# Patient Record
Sex: MALE | Race: WHITE | Employment: FULL TIME | URBAN - METROPOLITAN AREA
[De-identification: names, ages, dates, MRNs, and addresses within clinical notes are randomized per-mention and may not be internally consistent; named-entity substitution may affect disease eponyms.]

---

## 2017-01-04 ENCOUNTER — HOSPITAL ENCOUNTER (OUTPATIENT)
Dept: GENERAL RADIOLOGY | Age: 47
Discharge: HOME | End: 2017-01-04
Payer: COMMERCIAL

## 2017-01-04 DIAGNOSIS — R51.9 HEADACHE: ICD-10-CM

## 2017-01-04 DIAGNOSIS — M54.2 CERVICALGIA: ICD-10-CM

## 2017-01-04 PROCEDURE — 72050 X-RAY EXAM NECK SPINE 4/5VWS: CPT

## 2017-01-17 ENCOUNTER — OFFICE VISIT (OUTPATIENT)
Dept: NEUROLOGY | Age: 47
End: 2017-01-17

## 2017-01-17 VITALS
DIASTOLIC BLOOD PRESSURE: 80 MMHG | OXYGEN SATURATION: 98 % | HEIGHT: 72 IN | BODY MASS INDEX: 30.34 KG/M2 | WEIGHT: 224 LBS | RESPIRATION RATE: 18 BRPM | SYSTOLIC BLOOD PRESSURE: 118 MMHG | TEMPERATURE: 98.5 F | HEART RATE: 71 BPM

## 2017-01-17 DIAGNOSIS — G43.709 CHRONIC MIGRAINE W/O AURA W/O STATUS MIGRAINOSUS, NOT INTRACTABLE: ICD-10-CM

## 2017-01-17 DIAGNOSIS — M79.2 RADICULAR PAIN IN LEFT ARM: ICD-10-CM

## 2017-01-17 DIAGNOSIS — S06.0X0A CONCUSSION, WITHOUT LOSS OF CONSCIOUSNESS, INITIAL ENCOUNTER: ICD-10-CM

## 2017-01-17 DIAGNOSIS — Z87.898 HISTORY OF DIFFICULTY IN CONCENTRATING: ICD-10-CM

## 2017-01-17 DIAGNOSIS — R25.1 TREMOR OF LEFT HAND: ICD-10-CM

## 2017-01-17 DIAGNOSIS — Z87.820 HISTORY OF MULTIPLE CONCUSSIONS: ICD-10-CM

## 2017-01-17 DIAGNOSIS — R20.0 ARM NUMBNESS LEFT: ICD-10-CM

## 2017-01-17 DIAGNOSIS — R51.9 NEW ONSET HEADACHE: Primary | ICD-10-CM

## 2017-01-17 RX ORDER — LANOLIN ALCOHOL/MO/W.PET/CERES
400 CREAM (GRAM) TOPICAL
Qty: 90 TAB | Refills: 0 | Status: SHIPPED | OUTPATIENT
Start: 2017-01-17

## 2017-01-17 RX ORDER — VENLAFAXINE 75 MG/1
25 TABLET ORAL 3 TIMES DAILY
COMMUNITY
End: 2017-06-09 | Stop reason: DRUGHIGH

## 2017-01-17 NOTE — PROGRESS NOTES
Chief Complaint   Patient presents with    Concussion    Migraine       Referred by: Dr. Ritu Negron  and Dr. 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 He tells me that ever since he was a child he had multiple head injuries, 3 of which involving loss of consciousness. These were all sports related. His last concussion was June 2016 while he was working on a backyard deck. Concussion was severe enough that he dislocated his jaw. He has a history of chronic migraines usually left frontal/temporal periorbital throbbing pain associated with light and sound sensitivity. Since his concussion in June the headache has changed and he now has a right parietal headache. Headache is present daily fluctuating between dull and severe. Since his injury he is also had decreased focus and concentration. He has a hard time remembering little items. He was giving a presentation recently and lost his train of thought mid speech. He is also much more fatigued. He is more sensitive to alcohol than before. His sleep is chronically poor. He had a sleep study done many years ago which was borderline abnormal for sleep apnea. He saw Dr. Jacob Case in ENT who corrected his deviated septum which resulted in sleep improved slightly but still he has fatigue. There are plans to have a repeat sleep study done soon. He gets only about 3 or 4 hours a good quality sleep at night and the remainder is inconsistent. He wakes frequently during sleep. He has a history of depression. Most recently being treated with venlafaxine which is being titrated. He thinks that is helping in general overall. He has seen multiple neurologists for headache and neck pain management. He has chronic neck pain with left radicular symptoms. He has chronic left arm numbness in the ulnar distribution.     He has been on amitriptyline, gabapentin, and verapamil for headache prophylaxis in the past.    He is very concerned that his history of concussions combined with his most recent event will lead to cognitive decline. His headaches are daily as well. He had an MRI done in 2012 which had some nonspecific changes. I was able to see the report but not the actual imaging. Review of Systems   Constitutional: Positive for malaise/fatigue. Eyes: Positive for photophobia. Musculoskeletal: Positive for neck pain. Neurological: Positive for tremors, sensory change and headaches. Psychiatric/Behavioral: Positive for memory loss. The patient has insomnia. All other systems reviewed and are negative. Past Medical History   Diagnosis Date    Allergic rhinitis 4/8/2012    Asthma 4/8/2012    Asthma     Cancer ([facility] [address]      skin ca exision from scalp w/ subsequent  local numbness     HX OTHER MEDICAL      rt shoulder posterior instability     Migraine 4/8/2012    Sciatica     Sinus headache 4/8/2012    Tension headache 4/8/2012    Trauma      nasal fracture, 3 concussions, soft ball eye injury     Trauma 06/2016     concussion . dislocated jaw . eval'd by ENT . Dr. Andre Wilson and Dentist      History reviewed. No pertinent family history. Social History     Social History    Marital status:      Spouse name: N/A    Number of children: N/A    Years of education: N/A     Occupational History    Not on file. Social History Main Topics    Smoking status: Never Smoker    Smokeless tobacco: Not on file    Alcohol use Not on file    Drug use: Not on file    Sexual activity: Not on file     Other Topics Concern    Not on file     Social History Narrative     Current Outpatient Prescriptions   Medication Sig    IBUPROFEN (ADVIL PO) Take  by mouth.  aspirin-acetaminophen-caffeine (EXCEDRIN ES) 250-250-65 mg per tablet Take 1 Tab by mouth.  venlafaxine (EFFEXOR) 75 mg tablet Take 25 mg by mouth three (3) times daily.     DIPHENHYDRAMINE HCL (BENADRYL PO) Take  by mouth.    magnesium oxide (MAG-OX) 400 mg tablet Take 1 Tab by mouth nightly.  fluticasone (FLONASE) 50 mcg/actuation nasal spray 1 [place] by Both Nostrils route two (2) times a day.  amLODIPine-benazepril (LOTREL) 5-10 mg per capsule TAKE 1 CAPSULE DAILY    diclofenac EC (VOLTAREN) 50 mg EC tablet Take  by mouth two (2) times a day.  montelukast (SINGULAIR) 10 mg tablet Take 10 mg by mouth daily.  Cetirizine (ZYRTEC) 10 mg Cap Take  by mouth.  albuterol (PROVENTIL HFA, VENTOLIN HFA) 90 mcg/actuation inhaler Take 2 Puffs by inhalation every four (4) hours as needed.  CHLORZOXAZONE (LORZONE PO) Take  by mouth. 1/2 tab bid . No current facility-administered medications for this visit. Allergies   Allergen Reactions    Amitriptyline Other (comments)     Mental slowing     Gabapentin Other (comments)     Weight gain          Neurologic Exam     Mental Status   Oriented to person, place, and time. Attention: normal.   Speech: speech is normal   Level of consciousness: alert    Cranial Nerves   Cranial nerves II through XII intact. Motor Exam   Muscle bulk: normal  Overall muscle tone: normal    Strength   Strength 5/5 throughout. Left upper extremity slight cogwheel rigidity    Right lower extremity increased tone while supine     Sensory Exam   Light touch normal.   Sensory deficit distribution on left: C6    Gait, Coordination, and Reflexes     Gait  Gait: normal    Coordination   Finger to nose coordination: normal    Reflexes   Right biceps: 3+  Left biceps: 3+  Right triceps: 3+  Left triceps: 3+  Right patellar: 4+  Left patellar: 4+  Right plantar: normal  Left plantar: normal       Left hand kinetic tremor. No resting tremor. Minimal right hand kinetic tremor compared to left. Physical Exam   Constitutional: He is oriented to person, place, and time. Neurological: He is oriented to person, place, and time. He has normal strength.  He has a normal Finger-Nose-Finger Test. Gait normal.   Reflex Scores:       Tricep reflexes are 3+ on the right side and 3+ on the left side. Bicep reflexes are 3+ on the right side and 3+ on the left side. Patellar reflexes are 4+ on the right side and 4+ on the left side. Psychiatric: His speech is normal.     Visit Vitals    /80    Pulse 71    Temp 98.5 °F (36.9 °C)    Resp 18    Ht 5' 11.5\" (1.816 m)    Wt 101.6 kg (224 lb)    SpO2 98%    BMI 30.81 kg/m2       Lab Results  Component Value Date/Time   HGB (POC) 13.9 02/22/2016 10:45 AM   HCT (POC) 44.9 02/22/2016 10:45 AM       Lab Results  Component Value Date/Time   Glucose 94 02/22/2016 10:28 AM   Creatinine 0.83 02/22/2016 10:28 AM      No results found for: CHOL, CHOLX, CHLST, CHOLV, HDL, LDL, DLDL, LDLC, DLDLP, TGL, TGLX, TRIGL, [id number], TRIGP, CHHD, CHHDX    Lab Results  Component Value Date/Time   ALT 35 02/22/2016 10:28 AM   AST 32 02/22/2016 10:28 AM   Alk. phosphatase 61 02/22/2016 10:28 AM   Bilirubin, total 0.6 02/22/2016 10:28 AM          CT Results (maximum last 3): Results from [place] encounter on 02/05/15   CT SINUSES WO CONT W  [facility]   Narrative **Final Report**      ICD Codes / Adm. Diagnosis: 784.0  470 / Headache  Deviated nasal septum  Examination:  CT SINUS [facility] WO CON  - [id number] - Feb 5 2015  1:31PM  Accession No:  [id number]  Reason:  Headache; severe septal deviation      REPORT:  HISTORY: Chronic sinusitis. PROCEDURE:     Multiple contiguous helically acquired axial images were obtained through   the paranasal sinuses. Coronal and sagittal reconstructions were performed. FINDINGS:    The frontal sinuses are well pneumatized. The ethmoid air cells are well pneumatized. The osteomeatal complexes are generally narrowed but patent. Images through the maxillary sinuses reveal a small mucus retention cyst in   the left maxillary sinus. The sphenoid sinuses are well pneumatized.     The bony nasal septum is severely deviated towards right. No air-fluid level, bony expansile or emily destructive process are   identified. IMPRESSION:    Severe deviation of nasal septum toward the right. Generally narrowed but patent ostiomeatal complexes. Small mucus retention cyst in the left maxillary sinus. Signing/Reading Doctor: Arville Leventhal ([id number])    ApprovedArville Leventhal ([id number])  Feb 5 2015  2:55PM                                MRI Results (maximum last 3): Results from [facility] encounter on 10/10/14   MRI BRAIN W WO CONT    PET Results (maximum last 3): No results found for this or any previous visit. Assessment and Plan   Luba Riley was seen today for concussion and migraine. Diagnoses and all orders for this visit:    New onset headache    Concussion, without loss of consciousness, initial encounter    Chronic migraine w/o aura w/o status migrainosus, not intractable    Tremor of left hand  -     MRI BRAIN W WO CONT; Future  -     MRI CERV SPINE WO CONT; Future    History of multiple concussions    History of difficulty in concentrating    Radicular pain in left arm  -     MRI CERV SPINE WO CONT; Future    Arm numbness left  -     MRI BRAIN W WO CONT; Future  -     MRI CERV SPINE WO CONT; Future    Other orders  -     magnesium oxide (MAG-OX) 400 mg tablet; Take 1 Tab by mouth nightly. 48-year-old gentleman with multiple chronic conditions. 1.  He had a concussion in June 2016. He has had multiple concussions. He has an abnormal brain MRI. He still having postconcussive symptoms in the form of decreased focus and concentration and daily headache. Sleep is delaying recovery. Agree with plans for sleep study. Start magnesium nightly. He is hesitant to add any further medication while he is titrating venlafaxine which is reasonable. Also obtain MRI brain. He has a history of an abnormal study in 2012.   He also has this change in headache from his baseline. Headaches are usually left-sided but now they are right midline parietal.    2.  He meets criteria for chronic migraine. He has more than 15 days of headache pain per month lasting more than 4 hours each. He is already been on amitriptyline, verapamil, and gabapentin without benefit. We will refer for Botox injections. 3.  He has left arm numbness and pain in a radicular distribution. He is already receiving physical therapy for this. Exam is abnormal with notable hyperreflexia and increased tone. Recommend an MRI C-spine be done to further evaluate this. 4. the left hand tremor is noticeable. There is some slight increase rigidity. Follow this for now. Family history for Parkinson's. No other typical signs or symptoms for Parkinson's at this time. Seems essential in origin but could have pathology. I discussed with him that he needs to work on optimizing his sleep as much as possible. Poor sleep will make his above conditions refractory to treatment. I would like to see him in 3 months. If any of the above evaluation is acutely abnormal we will see him sooner. Continue physical therapy for now. A notice of this visit/encounter being completed has been sent electronically to the patient's PCP and/or referring provider.      2 [facility], [facility] [place] [facility] [place] [address]  Diplomate [employer]

## 2017-01-17 NOTE — COMMUNICATION BODY
Chief Complaint   Patient presents with    Concussion    Migraine       Referred by: Dr. Sharda Nguyen  and Dr. 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 He tells me that ever since he was a child he had multiple head injuries, 3 of which involving loss of consciousness. These were all sports related. His last concussion was June 2016 while he was working on a backyard deck. Concussion was severe enough that he dislocated his jaw. He has a history of chronic migraines usually left frontal/temporal periorbital throbbing pain associated with light and sound sensitivity. Since his concussion in June the headache has changed and he now has a right parietal headache. Headache is present daily fluctuating between dull and severe. Since his injury he is also had decreased focus and concentration. He has a hard time remembering little items. He was giving a presentation recently and lost his train of thought mid speech. He is also much more fatigued. He is more sensitive to alcohol than before. His sleep is chronically poor. He had a sleep study done many years ago which was borderline abnormal for sleep apnea. He saw Dr. Renata Lee in ENT who corrected his deviated septum which resulted in sleep improved slightly but still he has fatigue. There are plans to have a repeat sleep study done soon. He gets only about 3 or 4 hours a good quality sleep at night and the remainder is inconsistent. He wakes frequently during sleep. He has a history of depression. Most recently being treated with venlafaxine which is being titrated. He thinks that is helping in general overall. He has seen multiple neurologists for headache and neck pain management. He has chronic neck pain with left radicular symptoms. He has chronic left arm numbness in the ulnar distribution.     He has been on amitriptyline, gabapentin, and verapamil for headache prophylaxis in the past.    He is very concerned that his history of concussions combined with his most recent event will lead to cognitive decline. His headaches are daily as well. He had an MRI done in 2012 which had some nonspecific changes. I was able to see the report but not the actual imaging. Review of Systems   Constitutional: Positive for malaise/fatigue. Eyes: Positive for photophobia. Musculoskeletal: Positive for neck pain. Neurological: Positive for tremors, sensory change and headaches. Psychiatric/Behavioral: Positive for memory loss. The patient has insomnia. All other systems reviewed and are negative. Past Medical History   Diagnosis Date    Allergic rhinitis 4/8/2012    Asthma 4/8/2012    Asthma     Cancer ([facility] [address]      skin ca exision from scalp w/ subsequent  local numbness     HX OTHER MEDICAL      rt shoulder posterior instability     Migraine 4/8/2012    Sciatica     Sinus headache 4/8/2012    Tension headache 4/8/2012    Trauma      nasal fracture, 3 concussions, soft ball eye injury     Trauma 06/2016     concussion . dislocated jaw . eval'd by ENT . Dr. Ely Rico and Dentist      History reviewed. No pertinent family history. Social History     Social History    Marital status:      Spouse name: N/A    Number of children: N/A    Years of education: N/A     Occupational History    Not on file. Social History Main Topics    Smoking status: Never Smoker    Smokeless tobacco: Not on file    Alcohol use Not on file    Drug use: Not on file    Sexual activity: Not on file     Other Topics Concern    Not on file     Social History Narrative     Current Outpatient Prescriptions   Medication Sig    IBUPROFEN (ADVIL PO) Take  by mouth.  aspirin-acetaminophen-caffeine (EXCEDRIN ES) 250-250-65 mg per tablet Take 1 Tab by mouth.  venlafaxine (EFFEXOR) 75 mg tablet Take 25 mg by mouth three (3) times daily.     DIPHENHYDRAMINE HCL (BENADRYL PO) Take  by mouth.    magnesium oxide (MAG-OX) 400 mg tablet Take 1 Tab by mouth nightly.  fluticasone (FLONASE) 50 mcg/actuation nasal spray 1 [place] by Both Nostrils route two (2) times a day.  amLODIPine-benazepril (LOTREL) 5-10 mg per capsule TAKE 1 CAPSULE DAILY    diclofenac EC (VOLTAREN) 50 mg EC tablet Take  by mouth two (2) times a day.  montelukast (SINGULAIR) 10 mg tablet Take 10 mg by mouth daily.  Cetirizine (ZYRTEC) 10 mg Cap Take  by mouth.  albuterol (PROVENTIL HFA, VENTOLIN HFA) 90 mcg/actuation inhaler Take 2 Puffs by inhalation every four (4) hours as needed.  CHLORZOXAZONE (LORZONE PO) Take  by mouth. 1/2 tab bid . No current facility-administered medications for this visit. Allergies   Allergen Reactions    Amitriptyline Other (comments)     Mental slowing     Gabapentin Other (comments)     Weight gain          Neurologic Exam     Mental Status   Oriented to person, place, and time. Attention: normal.   Speech: speech is normal   Level of consciousness: alert    Cranial Nerves   Cranial nerves II through XII intact. Motor Exam   Muscle bulk: normal  Overall muscle tone: normal    Strength   Strength 5/5 throughout. Left upper extremity slight cogwheel rigidity    Right lower extremity increased tone while supine     Sensory Exam   Light touch normal.   Sensory deficit distribution on left: C6    Gait, Coordination, and Reflexes     Gait  Gait: normal    Coordination   Finger to nose coordination: normal    Reflexes   Right biceps: 3+  Left biceps: 3+  Right triceps: 3+  Left triceps: 3+  Right patellar: 4+  Left patellar: 4+  Right plantar: normal  Left plantar: normal       Left hand kinetic tremor. No resting tremor. Minimal right hand kinetic tremor compared to left. Physical Exam   Constitutional: He is oriented to person, place, and time. Neurological: He is oriented to person, place, and time. He has normal strength.  He has a normal Finger-Nose-Finger Test. Gait normal.   Reflex Scores:       Tricep reflexes are 3+ on the right side and 3+ on the left side. Bicep reflexes are 3+ on the right side and 3+ on the left side. Patellar reflexes are 4+ on the right side and 4+ on the left side. Psychiatric: His speech is normal.     Visit Vitals    /80    Pulse 71    Temp 98.5 °F (36.9 °C)    Resp 18    Ht 5' 11.5\" (1.816 m)    Wt 101.6 kg (224 lb)    SpO2 98%    BMI 30.81 kg/m2       Lab Results  Component Value Date/Time   HGB (POC) 13.9 02/22/2016 10:45 AM   HCT (POC) 44.9 02/22/2016 10:45 AM       Lab Results  Component Value Date/Time   Glucose 94 02/22/2016 10:28 AM   Creatinine 0.83 02/22/2016 10:28 AM      No results found for: CHOL, CHOLX, CHLST, CHOLV, HDL, LDL, DLDL, LDLC, DLDLP, TGL, TGLX, TRIGL, [id number], TRIGP, CHHD, CHHDX    Lab Results  Component Value Date/Time   ALT 35 02/22/2016 10:28 AM   AST 32 02/22/2016 10:28 AM   Alk. phosphatase 61 02/22/2016 10:28 AM   Bilirubin, total 0.6 02/22/2016 10:28 AM          CT Results (maximum last 3): Results from [place] encounter on 02/05/15   CT SINUSES WO CONT W  [facility]   Narrative **Final Report**      ICD Codes / Adm. Diagnosis: 784.0  470 / Headache  Deviated nasal septum  Examination:  CT SINUS [facility] WO CON  - [id number] - Feb 5 2015  1:31PM  Accession No:  [id number]  Reason:  Headache; severe septal deviation      REPORT:  HISTORY: Chronic sinusitis. PROCEDURE:     Multiple contiguous helically acquired axial images were obtained through   the paranasal sinuses. Coronal and sagittal reconstructions were performed. FINDINGS:    The frontal sinuses are well pneumatized. The ethmoid air cells are well pneumatized. The osteomeatal complexes are generally narrowed but patent. Images through the maxillary sinuses reveal a small mucus retention cyst in   the left maxillary sinus. The sphenoid sinuses are well pneumatized.     The bony nasal septum is severely deviated towards right. No air-fluid level, bony expansile or emily destructive process are   identified. IMPRESSION:    Severe deviation of nasal septum toward the right. Generally narrowed but patent ostiomeatal complexes. Small mucus retention cyst in the left maxillary sinus. Signing/Reading Doctor: Gabrielle Thomas ([id number])    Karen Thomas ([id number])  Feb 5 2015  2:55PM                                MRI Results (maximum last 3): Results from Abstract encounter on 10/10/14   MRI BRAIN W WO CONT    PET Results (maximum last 3): No results found for this or any previous visit. Assessment and Plan   Raffy Barreto was seen today for concussion and migraine. Diagnoses and all orders for this visit:    New onset headache    Concussion, without loss of consciousness, initial encounter    Chronic migraine w/o aura w/o status migrainosus, not intractable    Tremor of left hand  -     MRI BRAIN W WO CONT; Future  -     MRI CERV SPINE WO CONT; Future    History of multiple concussions    History of difficulty in concentrating    Radicular pain in left arm  -     MRI CERV SPINE WO CONT; Future    Arm numbness left  -     MRI BRAIN W WO CONT; Future  -     MRI CERV SPINE WO CONT; Future    Other orders  -     magnesium oxide (MAG-OX) 400 mg tablet; Take 1 Tab by mouth nightly. 50-year-old gentleman with multiple chronic conditions. 1.  He had a concussion in June 2016. He has had multiple concussions. He has an abnormal brain MRI. He still having postconcussive symptoms in the form of decreased focus and concentration and daily headache. Sleep is delaying recovery. Agree with plans for sleep study. Start magnesium nightly. He is hesitant to add any further medication while he is titrating venlafaxine which is reasonable. Also obtain MRI brain. He has a history of an abnormal study in 2012.   He also has this change in headache from his baseline. Headaches are usually left-sided but now they are right midline parietal.    2.  He meets criteria for chronic migraine. He has more than 15 days of headache pain per month lasting more than 4 hours each. He is already been on amitriptyline, verapamil, and gabapentin without benefit. We will refer for Botox injections. 3.  He has left arm numbness and pain in a radicular distribution. He is already receiving physical therapy for this. Exam is abnormal with notable hyperreflexia and increased tone. Recommend an MRI C-spine be done to further evaluate this. 4. the left hand tremor is noticeable. There is some slight increase rigidity. Follow this for now. Family history for Parkinson's. No other typical signs or symptoms for Parkinson's at this time. Seems essential in origin but could have pathology. I discussed with him that he needs to work on optimizing his sleep as much as possible. Poor sleep will make his above conditions refractory to treatment. I would like to see him in 3 months. If any of the above evaluation is acutely abnormal we will see him sooner. Continue physical therapy for now. A notice of this visit/encounter being completed has been sent electronically to the patient's PCP and/or referring provider.      2 [facility], [facility] [address]  Diplomate [employer]

## 2017-01-17 NOTE — MR AVS SNAPSHOT
Visit Information Date & Time Provider Department Dept. Phone Encounter #  
 1/17/2017  9:00  [facility] DO [facility] at [address] [id number] Follow-up Instructions Return in about 3 months (around 4/17/2017). Routing History Upcoming Health Maintenance Date Due Pneumococcal 19-64 Medium Risk (1 of 1 - PPSV23) 11/19/1988 DTaP/Tdap/Td series (1 - Tdap) 5/21/2005 INFLUENZA AGE 9 TO ADULT 8/1/2016 Allergies as of 1/17/2017  Review Complete On: 1/17/2017 By: Kunal Noel LPN Severity Noted Reaction Type Reactions Amitriptyline  12/09/2016    Other (comments) Mental slowing Gabapentin  12/09/2016    Other (comments) Weight gain Current Immunizations  Reviewed on 11/4/2013 Name Date Td 5/20/2005 Not reviewed this visit You Were Diagnosed With   
  
 Codes Comments New onset headache    -  Primary ICD-10-CM: R46 ICD-9-CM: 784.0 Concussion, without loss of consciousness, initial encounter     ICD-10-CM: S06.0X0A 
ICD-9-CM: 850.0 Chronic migraine w/o aura w/o status migrainosus, not intractable     ICD-10-CM: B18.014 ICD-9-CM: 346.70 Tremor of left hand     ICD-10-CM: R25.1 ICD-9-CM: 781.0 History of multiple concussions     ICD-10-CM: Z87.820 ICD-9-CM: V15.52 History of difficulty in concentrating     ICD-10-CM: Z86.59 
ICD-9-CM: V11.9 Radicular pain in left arm     ICD-10-CM: M79.2 ICD-9-CM: 729.2 Arm numbness left     ICD-10-CM: R20.0 ICD-9-CM: 876. 0 Vitals BP Pulse Temp Resp Height(growth percentile) Weight(growth percentile) 118/80 71 98.5 °F (36.9 °C) 18 5' 11.5\" (1.816 m) 224 lb (101.6 kg) SpO2 BMI Smoking Status 98% 30.81 kg/m2 Never Smoker Vitals History BMI and BSA Data Body Mass Index Body Surface Area  
 30.81 kg/m 2 2.26 m 2 [phone] Your Updated Medication List  
  
   
This list is accurate as of: 1/17/17  9:46 AM.  Always use your most recent med list. ADVIL PO Take  by mouth. albuterol 90 mcg/actuation inhaler Commonly known as:  PROVENTIL HFA, VENTOLIN HFA, PROAIR HFA Take 2 Puffs by inhalation every four (4) hours as needed. amLODIPine-benazepril 5-10 mg per capsule Commonly known as:  LOTREL  
TAKE 1 CAPSULE DAILY  
  
 aspirin-acetaminophen-caffeine 250-250-65 mg per tablet Commonly known as:  EXCEDRIN ES Take 1 Tab by mouth. BENADRYL PO Take  by mouth. diclofenac EC 50 mg EC tablet Commonly known as:  VOLTAREN Take  by mouth two (2) times a day. fluticasone 50 mcg/actuation nasal spray Commonly known as:  FLONASE  
1 [place] by Both Nostrils route two (2) times a day. LORZONE PO Take  by mouth. 1/2 tab bid .  
  
 magnesium oxide 400 mg tablet Commonly known as:  MAG-OX Take 1 Tab by mouth nightly. SINGULAIR 10 mg tablet Generic drug:  montelukast  
Take 10 mg by mouth daily. venlafaxine 75 mg tablet Commonly known as:  [facility] Take 25 mg by mouth three (3) times daily. ZyrTEC 10 mg Cap Generic drug:  Cetirizine Take  by mouth. Prescriptions Sent to Pharmacy Refills  
 magnesium oxide (MAG-OX) 400 mg tablet 0 Sig: Take 1 Tab by mouth nightly. Class: Normal  
 Pharmacy: [address], [address] [place] [address] #: [phone] Route: Oral  
  
Follow-up Instructions Return in about 3 months (around 4/17/2017). To-Do List   
 01/17/2017 Imaging:  MRI BRAIN W WO CONT   
  
 01/17/2017 Imaging:  MRI CERV SPINE WO CONT Patient Instructions PRESCRIPTION REFILL POLICY [facility] Statement to Patients April 1, 2014 In an effort to ensure the large volume of patient prescription refills is processed in the most efficient and expeditious manner, we are asking our patients to assist us by calling your Pharmacy for all prescription refills, this will include also your  Mail Order Pharmacy. The pharmacy will contact our office electronically to continue the refill process. Please do not wait until the last minute to call your pharmacy. We need at least 48 hours (2days) to fill prescriptions. We also encourage you to call your pharmacy before going to  your prescription to make sure it is ready. With regard to controlled substance prescription refill requests (narcotic refills) that need to be picked up at our office, we ask your cooperation by providing us with at least 72 hours (3days) notice that you will need a refill. We will not refill narcotic prescription refill requests after 4:00pm on any weekday, Monday through Thursday, or after 2:00pm on Fridays, or on the weekends. We encourage everyone to explore another way of getting your prescription refill request processed using [employer], our patient web portal through our electronic medical record system. [employer] is an efficient and effective way to communicate your medication request directly to the office and  downloadable as an mily on your smart phone . [employer] also features a review functionality that allows you to view your medication list as well as leave messages for your physician. Are you ready to get connected? If so please review the attatched instructions or speak to any of our staff to get you set up right away! Thank you so much for your cooperation. Should you have any questions please contact our Practice Administrator. The Physicians and Staff,  [facility] Thank you for choosing [employer] and [facility] for your  
 
care. You may receive a survey about your visit.   We appreciate you taking time  
 
to complete this survey as we use your feedback to improve our services. We  
 
realize we are not perfect, but we strive to provide excellent care. Recurring Migraine Headache: Care Instructions Your Care Instructions Migraines are painful, throbbing headaches. They often start on one side of the head. They may cause nausea and vomiting and make you sensitive to light, sound, or smell. Some people may have only a few migraines throughout life. Others have them as often as several times a month. The goal of treatment is to reduce the number of migraines you have and relieve your symptoms. Even with treatment, you may continue to have migraines. You play an important role in dealing with your headaches. Work on avoiding things that seem to trigger your migraines. When you feel a headache coming on, act quickly to stop it before it gets worse. Follow-up care is a key part of your treatment and safety. Be sure to make and go to all appointments, and call your doctor if you are having problems. It's also a good idea to know your test results and keep a list of the medicines you take. How can you care for yourself at home? · Do not drive if you have taken a prescription pain medicine. · Rest in a quiet, dark room until your headache is gone. Close your eyes and try to relax or go to sleep. Do not watch TV or read. · Put a cold, moist cloth or cold pack on the painful area for 10 to 20 minutes at a time. Put a thin cloth between the cold pack and your skin. · Have someone gently massage your neck and shoulders. · Take your medicines exactly as prescribed. Call your doctor if you think you are having a problem with your medicine. You will get more details on the specific medicines your doctor prescribes. To prevent migraines · Keep a headache diary so you can figure out what triggers your headaches. Avoiding triggers may help you prevent headaches.  Record when each headache began, how long it lasted, and what the pain was like. Use words like throbbing, aching, stabbing, or dull. Write down any other symptoms you had with the headache. These may include nausea, flashing lights or dark spots, or sensitivity to bright light or loud noise. Note if the headache occurred near your period. List anything that might have triggered the headache. Triggers may include certain foods (chocolate, cheese, wine) or odors, smoke, bright light, stress, or lack of sleep. · If your doctor has prescribed medicine for your migraines, take it as directed. You may have medicine that you take only when you get a migraine and medicine that you take all the time to help prevent migraines. ¨ If your doctor has prescribed medicine for when you get a headache, take it at the first sign of a migraine, unless your doctor has given you other instructions. ¨ If your doctor has prescribed medicine to prevent migraines, take it exactly as prescribed. Call your doctor if you think you are having a problem with your medicine. · Find healthy ways to deal with stress. Migraines are most common during or right after stressful times. Take time to relax before and after you do something that has caused a migraine in the past. 
· Try to keep your muscles relaxed by keeping good posture. Check your jaw, face, neck, and shoulder muscles for tension. Try to relax them. When sitting at a desk, change positions often. Stretch for 30 seconds each hour. · Get regular sleep and exercise. · Eat regular meals, and avoid foods and drinks that often trigger migraines. These include chocolate and alcohol, especially red wine and port. Chemicals used in food, such as aspartame and monosodium glutamate (MSG), also can trigger migraines. So can some food additives, such as those found in hot dogs, mohamud, cold cuts, aged cheeses, and pickled foods. · Limit caffeine by not drinking too much coffee, tea, or soda.  Do not quit caffeine suddenly, because that can also give you migraines. · Do not smoke or allow others to smoke around you. If you need help quitting, talk to your doctor about stop-smoking programs and medicines. These can increase your chances of quitting for good. · If you are taking birth control pills or hormone therapy, talk to your doctor about whether they are triggering your migraines. When should you call for help? Call 911 anytime you think you may need emergency care. For example, call if: 
· You have symptoms of a stroke. These may include: 
¨ Sudden numbness, tingling, weakness, or loss of movement in your face, arm, or leg, especially on only one side of your body. ¨ Sudden vision changes. ¨ Sudden trouble speaking. ¨ Sudden confusion or trouble understanding simple statements. ¨ Sudden problems with walking or balance. ¨ A sudden, severe headache that is different from past headaches. Call your doctor now or seek immediate medical care if: 
· You develop a fever and a stiff neck. · You have new nausea and vomiting, or you cannot keep down food or liquids. Watch closely for changes in your health, and be sure to contact your doctor if: 
· You have a headache that does not get better within 1 or 2 days. · Your headaches get worse or happen more often. Where can you learn more? Go to http://casey-zachariah.info/. Enter V975 in the search box to learn more about \"Recurring Migraine Headache: Care Instructions. \" Current as of: February 19, 2016 Content Version: 11.1 © 9729-5914 [employer]. Care instructions adapted under license by [employer] (which disclaims liability or warranty for this information). If you have questions about a medical condition or this instruction, always ask your healthcare professional. [place] [address] any warranty or liability for your use of this information. Learning About a Closed Head Injury What is a closed head injury? A closed head injury happens when your head gets hit hard. The strong force of the blow causes your brain to shake in your skull. This movement can cause the brain to bruise, swell, or tear. Sometimes nerves or blood vessels also get damaged. This can cause bleeding in or around the brain. A concussion is a type of closed head injury. What are the symptoms? If you have a mild concussion, you may have a mild headache or feel \"not quite right. \" These symptoms are common. They usually go away over a few days to 4 weeks. But sometimes after a concussion, you feel like you can't function as well as before the injury. And you have new symptoms. This is called postconcussive syndrome. You may: · Find it harder to solve problems, think, concentrate, or remember. · Have headaches. · Have changes in your sleep patterns, such as not being able to sleep or sleeping all the time. · Have changes in your personality. · Not be interested in your usual activities. · Feel angry or anxious without a clear reason. · Lose your sense of taste or smell. · Be dizzy, lightheaded, or unsteady. It may be hard to stand or walk. How is a closed head injury treated? Any person who may have a concussion needs to see a doctor. Some people have to stay in the hospital to be watched. Others can go home safely. If you go home, follow your doctor's instructions. He or she will tell you if you need someone to watch you closely for the next 24 hours or longer. Rest is the best treatment. Get plenty of sleep at night. And try to rest during the day. · Avoid activities that are physically or mentally demanding. These include housework, exercise, and schoolwork. And don't play video games, send text messages, or use the computer. You may need to change your school or work schedule to be able to avoid these activities. · Ask your doctor when it's okay to drive, ride a bike, or operate machinery. · Take an over-the-counter pain medicine, such as acetaminophen (Tylenol), ibuprofen (Advil, Motrin), or naproxen (Aleve). Be safe with medicines. Read and follow all instructions on the label. · Check with your doctor before you use any other medicines for pain. · Do not drink alcohol or use illegal drugs. They can slow recovery. They can also increase your risk of getting a second head injury. Follow-up care is a key part of your treatment and safety. Be sure to make and go to all appointments, and call your doctor if you are having problems. It's also a good idea to know your test results and keep a list of the medicines you take. Where can you learn more? Go to http://casey-zachariah.info/. Enter E235 in the search box to learn more about \"Learning About a Closed Head Injury. \" Current as of: April 22, 2016 Content Version: 11.1 © 5894-3630 [employer]. Care instructions adapted under license by [employer] (which disclaims liability or warranty for this information). If you have questions about a medical condition or this instruction, always ask your healthcare professional. [place] [address] any warranty or liability for your use of this information. Introducing [facility]! Johana Kent introduces [employer] patient portal. Now you can access parts of your medical record, email your doctor's office, and request medication refills online. 1. In your internet browser, go to [email or web address] 2. Click on the First Time User? Click Here link in the Sign In box. You will see the New Member Sign Up page. 3. Enter your [employer] Access Code exactly as it appears below. You will not need to use this code after youve completed the sign-up process. If you do not sign up before the expiration date, you must request a new code. · [employer] Access Code: [email or web address] Expires: 4/4/2017  1:51 PM 
 
 4. Enter the last four digits of your Social Security Number (xxxx) and Date of Birth (mm/dd/yyyy) as indicated and click Submit. You will be taken to the next sign-up page. 5. Create a [employer] ID. This will be your [employer] login ID and cannot be changed, so think of one that is secure and easy to remember. 6. Create a [employer] password. You can change your password at any time. 7. Enter your Password Reset Question and Answer. This can be used at a later time if you forget your password. 8. Enter your e-mail address. You will receive e-mail notification when new information is available in [address] 9. Click Sign Up. You can now view and download portions of your medical record. 10. Click the Download Summary menu link to download a portable copy of your medical information. If you have questions, please visit the Frequently Asked Questions section of the [employer] website. Remember, [employer] is NOT to be used for urgent needs. For medical emergencies, dial 911. Now available from your iPhone and Android! Please provide this summary of care documentation to your next provider. Your primary care clinician is listed as [id number] [address]omes IV. If you have any questions after today's visit, please call [phone].

## 2017-01-17 NOTE — PATIENT INSTRUCTIONS
[facility]   Statement to Patients  April 1, 2014      In an effort to ensure the large volume of patient prescription refills is processed in the most efficient and expeditious manner, we are asking our patients to assist us by calling your Pharmacy for all prescription refills, this will include also your  Mail Order Pharmacy. The pharmacy will contact our office electronically to continue the refill process. Please do not wait until the last minute to call your pharmacy. We need at least 48 hours (2days) to fill prescriptions. We also encourage you to call your pharmacy before going to  your prescription to make sure it is ready. With regard to controlled substance prescription refill requests (narcotic refills) that need to be picked up at our office, we ask your cooperation by providing us with at least 72 hours (3days) notice that you will need a refill. We will not refill narcotic prescription refill requests after 4:00pm on any weekday, Monday through Thursday, or after 2:00pm on Fridays, or on the weekends. We encourage everyone to explore another way of getting your prescription refill request processed using [employer], our patient web portal through our electronic medical record system. [employer] is an efficient and effective way to communicate your medication request directly to the office and  downloadable as an mily on your smart phone . [employer] also features a review functionality that allows you to view your medication list as well as leave messages for your physician. Are you ready to get connected? If so please review the attatched instructions or speak to any of our staff to get you set up right away! Thank you so much for your cooperation. Should you have any questions please contact our Practice Administrator.     The Physicians and Staff,  [facility]     Thank you for choosing [facility] for your     care. You may receive a survey about your visit. We appreciate you taking time     to complete this survey as we use your feedback to improve our services. We     realize we are not perfect, but we strive to provide excellent care. Recurring Migraine Headache: Care Instructions  Your Care Instructions  Migraines are painful, throbbing headaches. They often start on one side of the head. They may cause nausea and vomiting and make you sensitive to light, sound, or smell. Some people may have only a few migraines throughout life. Others have them as often as several times a month. The goal of treatment is to reduce the number of migraines you have and relieve your symptoms. Even with treatment, you may continue to have migraines. You play an important role in dealing with your headaches. Work on avoiding things that seem to trigger your migraines. When you feel a headache coming on, act quickly to stop it before it gets worse. Follow-up care is a key part of your treatment and safety. Be sure to make and go to all appointments, and call your doctor if you are having problems. It's also a good idea to know your test results and keep a list of the medicines you take. How can you care for yourself at home? · Do not drive if you have taken a prescription pain medicine. · Rest in a quiet, dark room until your headache is gone. Close your eyes and try to relax or go to sleep. Do not watch TV or read. · Put a cold, moist cloth or cold pack on the painful area for 10 to 20 minutes at a time. Put a thin cloth between the cold pack and your skin. · Have someone gently massage your neck and shoulders. · Take your medicines exactly as prescribed. Call your doctor if you think you are having a problem with your medicine. You will get more details on the specific medicines your doctor prescribes.   To prevent migraines  · Keep a headache diary so you can figure out what triggers your headaches. Avoiding triggers may help you prevent headaches. Record when each headache began, how long it lasted, and what the pain was like. Use words like throbbing, aching, stabbing, or dull. Write down any other symptoms you had with the headache. These may include nausea, flashing lights or dark spots, or sensitivity to bright light or loud noise. Note if the headache occurred near your period. List anything that might have triggered the headache. Triggers may include certain foods (chocolate, cheese, wine) or odors, smoke, bright light, stress, or lack of sleep. · If your doctor has prescribed medicine for your migraines, take it as directed. You may have medicine that you take only when you get a migraine and medicine that you take all the time to help prevent migraines. ¨ If your doctor has prescribed medicine for when you get a headache, take it at the first sign of a migraine, unless your doctor has given you other instructions. ¨ If your doctor has prescribed medicine to prevent migraines, take it exactly as prescribed. Call your doctor if you think you are having a problem with your medicine. · Find healthy ways to deal with stress. Migraines are most common during or right after stressful times. Take time to relax before and after you do something that has caused a migraine in the past.  · Try to keep your muscles relaxed by keeping good posture. Check your jaw, face, neck, and shoulder muscles for tension. Try to relax them. When sitting at a desk, change positions often. Stretch for 30 seconds each hour. · Get regular sleep and exercise. · Eat regular meals, and avoid foods and drinks that often trigger migraines. These include chocolate and alcohol, especially red wine and port. Chemicals used in food, such as aspartame and monosodium glutamate (MSG), also can trigger migraines.  So can some food additives, such as those found in hot dogs, mohamud, cold cuts, aged cheeses, and pickled foods.  · Limit caffeine by not drinking too much coffee, tea, or soda. Do not quit caffeine suddenly, because that can also give you migraines. · Do not smoke or allow others to smoke around you. If you need help quitting, talk to your doctor about stop-smoking programs and medicines. These can increase your chances of quitting for good. · If you are taking birth control pills or hormone therapy, talk to your doctor about whether they are triggering your migraines. When should you call for help? Call 911 anytime you think you may need emergency care. For example, call if:  · You have symptoms of a stroke. These may include:  ¨ Sudden numbness, tingling, weakness, or loss of movement in your face, arm, or leg, especially on only one side of your body. ¨ Sudden vision changes. ¨ Sudden trouble speaking. ¨ Sudden confusion or trouble understanding simple statements. ¨ Sudden problems with walking or balance. ¨ A sudden, severe headache that is different from past headaches. Call your doctor now or seek immediate medical care if:  · You develop a fever and a stiff neck. · You have new nausea and vomiting, or you cannot keep down food or liquids. Watch closely for changes in your health, and be sure to contact your doctor if:  · You have a headache that does not get better within 1 or 2 days. · Your headaches get worse or happen more often. Where can you learn more? Go to http://casey-zachariah.info/. Enter V975 in the search box to learn more about \"Recurring Migraine Headache: Care Instructions. \"  Current as of: February 19, 2016  Content Version: 11.1  © 7242-9215 [employer]. Care instructions adapted under license by [employer] (which disclaims liability or warranty for this information).  If you have questions about a medical condition or this instruction, always ask your healthcare professional. Lenka Gallego disclaims any warranty or liability for your use of this information. Learning About a Closed Head Injury  What is a closed head injury? A closed head injury happens when your head gets hit hard. The strong force of the blow causes your brain to shake in your skull. This movement can cause the brain to bruise, swell, or tear. Sometimes nerves or blood vessels also get damaged. This can cause bleeding in or around the brain. A concussion is a type of closed head injury. What are the symptoms? If you have a mild concussion, you may have a mild headache or feel \"not quite right. \" These symptoms are common. They usually go away over a few days to 4 weeks. But sometimes after a concussion, you feel like you can't function as well as before the injury. And you have new symptoms. This is called postconcussive syndrome. You may:  · Find it harder to solve problems, think, concentrate, or remember. · Have headaches. · Have changes in your sleep patterns, such as not being able to sleep or sleeping all the time. · Have changes in your personality. · Not be interested in your usual activities. · Feel angry or anxious without a clear reason. · Lose your sense of taste or smell. · Be dizzy, lightheaded, or unsteady. It may be hard to stand or walk. How is a closed head injury treated? Any person who may have a concussion needs to see a doctor. Some people have to stay in the hospital to be watched. Others can go home safely. If you go home, follow your doctor's instructions. He or she will tell you if you need someone to watch you closely for the next 24 hours or longer. Rest is the best treatment. Get plenty of sleep at night. And try to rest during the day. · Avoid activities that are physically or mentally demanding. These include housework, exercise, and schoolwork. And don't play video games, send text messages, or use the computer. You may need to change your school or work schedule to be able to avoid these activities.   · Ask your doctor when it's okay to drive, ride a bike, or operate machinery. · Take an over-the-counter pain medicine, such as acetaminophen (Tylenol), ibuprofen (Advil, Motrin), or naproxen (Aleve). Be safe with medicines. Read and follow all instructions on the label. · Check with your doctor before you use any other medicines for pain. · Do not drink alcohol or use illegal drugs. They can slow recovery. They can also increase your risk of getting a second head injury. Follow-up care is a key part of your treatment and safety. Be sure to make and go to all appointments, and call your doctor if you are having problems. It's also a good idea to know your test results and keep a list of the medicines you take. Where can you learn more? Go to http://casey-zachariah.info/. Enter E235 in the search box to learn more about \"Learning About a Closed Head Injury. \"  Current as of: April 22, 2016  Content Version: 11.1  © 1126-2134 [employer]. Care instructions adapted under license by [employer] (which disclaims liability or warranty for this information). If you have questions about a medical condition or this instruction, always ask your healthcare professional. [address] any warranty or liability for your use of this information.

## 2017-01-17 NOTE — LETTER
1/17/2017 Patient:  Drea Garibay YOB: 1970 Date of Visit: 1/17/2017 Dear Amado Hall MD 
[address] [id number] VIA In Basket Mary Atkins MD 
[address] [facility] Cecy Knowles [phone] [id number] VIA Facsimile: [phone] 
 : I was requested by Amado Hall MD to evaluate Mr. Drea Garibay  for Chief Complaint Patient presents with  Concussion  Migraine Stefan Stinson I am recommending the following:  
 
[facility] was seen today for concussion and migraine. Diagnoses and all orders for this visit: 
 
New onset headache Concussion, without loss of consciousness, initial encounter Chronic migraine w/o aura w/o status migrainosus, not intractable 
-     REFERRAL TO NEUROLOGY Tremor of left hand -     MRI BRAIN W WO CONT; Future -     MRI CERV SPINE WO CONT; Future History of multiple concussions History of difficulty in concentrating Radicular pain in left arm -     MRI CERV SPINE WO CONT; Future Arm numbness left -     MRI BRAIN W WO CONT; Future -     MRI CERV SPINE WO CONT; Future Other orders 
-     magnesium oxide (MAG-OX) 400 mg tablet; Take 1 Tab by mouth nightly. -     onabotulinumtoxinA (BOTOX) 200 unit injection; 200 Units by IntraMUSCular route once for 1 dose. 
 
 
 
---------------------------------------------------------------------------------------------------------------------- Below is my encounter: Chief Complaint Patient presents with  Concussion  Migraine Referred by: Dr. Uma Garcias  and Dr. 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 He tells me that ever since he was a child he had multiple head injuries, 3 of which involving loss of consciousness. These were all sports related.   His last concussion was June 2016 while he was working on a backyard deck. Concussion was severe enough that he dislocated his jaw. He has a history of chronic migraines usually left frontal/temporal periorbital throbbing pain associated with light and sound sensitivity. Since his concussion in June the headache has changed and he now has a right parietal headache. Headache is present daily fluctuating between dull and severe. Since his injury he is also had decreased focus and concentration. He has a hard time remembering little items. He was giving a presentation recently and lost his train of thought mid speech. He is also much more fatigued. He is more sensitive to alcohol than before. His sleep is chronically poor. He had a sleep study done many years ago which was borderline abnormal for sleep apnea. He saw Dr. Rosa Izquierdo in ENT who corrected his deviated septum which resulted in sleep improved slightly but still he has fatigue. There are plans to have a repeat sleep study done soon. He gets only about 3 or 4 hours a good quality sleep at night and the remainder is inconsistent. He wakes frequently during sleep. He has a history of depression. Most recently being treated with venlafaxine which is being titrated. He thinks that is helping in general overall. He has seen multiple neurologists for headache and neck pain management. He has chronic neck pain with left radicular symptoms. He has chronic left arm numbness in the ulnar distribution. He has been on amitriptyline, gabapentin, and verapamil for headache prophylaxis in the past. 
 
He is very concerned that his history of concussions combined with his most recent event will lead to cognitive decline. His headaches are daily as well. He had an MRI done in 2012 which had some nonspecific changes. I was able to see the report but not the actual imaging. Review of Systems Constitutional: Positive for malaise/fatigue. Eyes: Positive for photophobia. Musculoskeletal: Positive for neck pain. Neurological: Positive for tremors, sensory change and headaches. Psychiatric/Behavioral: Positive for memory loss. The patient has insomnia. All other systems reviewed and are negative. Past Medical History Diagnosis Date  Allergic rhinitis 4/8/2012  Asthma 4/8/2012  Asthma  Cancer ([address] skin ca exision from scalp w/ subsequent  local numbness  HX OTHER MEDICAL   
  rt shoulder posterior instability  Migraine 4/8/2012  Sciatica  Sinus headache 4/8/2012  Tension headache 4/8/2012  Trauma   
  nasal fracture, 3 concussions, soft ball eye injury  Trauma 06/2016  
  concussion . dislocated jaw . eval'd by ENT . Dr. Ariana Hawthorne and Dentist   
 
History reviewed. No pertinent family history. Social History Social History  Marital status:  Spouse name: N/A  
 Number of children: N/A  
 Years of education: N/A Occupational History  Not on file. Social History Main Topics  Smoking status: Never Smoker  Smokeless tobacco: Not on file  Alcohol use Not on file  Drug use: Not on file  Sexual activity: Not on file Other Topics Concern  Not on file Social History Narrative Current Outpatient Prescriptions Medication Sig  
 IBUPROFEN (ADVIL PO) Take  by mouth.  aspirin-acetaminophen-caffeine (EXCEDRIN ES) 250-250-65 mg per tablet Take 1 Tab by mouth.  venlafaxine (EFFEXOR) 75 mg tablet Take 25 mg by mouth three (3) times daily.  DIPHENHYDRAMINE HCL (BENADRYL PO) Take  by mouth.  magnesium oxide (MAG-OX) 400 mg tablet Take 1 Tab by mouth nightly.  fluticasone (FLONASE) 50 mcg/actuation nasal spray 1 [place] by Both Nostrils route two (2) times a day.  amLODIPine-benazepril (LOTREL) 5-10 mg per capsule TAKE 1 CAPSULE DAILY  diclofenac EC (VOLTAREN) 50 mg EC tablet Take  by mouth two (2) times a day.  montelukast (SINGULAIR) 10 mg tablet Take 10 mg by mouth daily.  Cetirizine (ZYRTEC) 10 mg Cap Take  by mouth.  albuterol (PROVENTIL HFA, VENTOLIN HFA) 90 mcg/actuation inhaler Take 2 Puffs by inhalation every four (4) hours as needed.  CHLORZOXAZONE (LORZONE PO) Take  by mouth. 1/2 tab bid . No current facility-administered medications for this visit. Allergies Allergen Reactions  Amitriptyline Other (comments) Mental slowing  Gabapentin Other (comments) Weight gain Neurologic Exam  
 
Mental Status Oriented to person, place, and time. Attention: normal.  
Speech: speech is normal  
Level of consciousness: alert Cranial Nerves Cranial nerves II through XII intact. Motor Exam  
Muscle bulk: normal 
Overall muscle tone: normal 
 
Strength Strength 5/5 throughout. Left upper extremity slight cogwheel rigidity Right lower extremity increased tone while supine Sensory Exam  
Light touch normal.  
Sensory deficit distribution on left: C6 
 
Gait, Coordination, and Reflexes Gait Gait: normal 
 
Coordination Finger to nose coordination: normal 
 
Reflexes Right biceps: 3+ Left biceps: 3+ Right triceps: 3+ Left triceps: 3+ Right patellar: 4+ Left patellar: 4+ Right plantar: normal 
Left plantar: normal 
     Left hand kinetic tremor. No resting tremor. Minimal right hand kinetic tremor compared to left. Physical Exam  
Constitutional: He is oriented to person, place, and time. Neurological: He is oriented to person, place, and time. He has normal strength. He has a normal Finger-Nose-Finger Test. Gait normal.  
Reflex Scores: 
     Tricep reflexes are 3+ on the right side and 3+ on the left side. Bicep reflexes are 3+ on the right side and 3+ on the left side. Patellar reflexes are 4+ on the right side and 4+ on the left side. Psychiatric: His speech is normal.  
 
Visit Vitals  /80  Pulse 71  
  Temp 98.5 °F (36.9 °C)  Resp 18  Ht 5' 11.5\" (1.816 m)  Wt 101.6 kg (224 lb)  SpO2 98%  BMI 30.81 kg/m2 Lab Results Component Value Date/Time HGB (POC) 13.9 02/22/2016 10:45 AM  
HCT (POC) 44.9 02/22/2016 10:45 AM  
 
 
Lab Results Component Value Date/Time Glucose 94 02/22/2016 10:28 AM  
Creatinine 0.83 02/22/2016 10:28 AM  
  
No results found for: CHOL, CHOLX, CHLST, CHOLV, HDL, LDL, DLDL, LDLC, DLDLP, TGL, TGLX, TRIGL, [id number], TRIGP, CHHD, CHHDX Lab Results Component Value Date/Time ALT 35 02/22/2016 10:28 AM  
AST 32 02/22/2016 10:28 AM  
Alk. phosphatase 61 02/22/2016 10:28 AM  
Bilirubin, total 0.6 02/22/2016 10:28 AM  
 
  
 
CT Results (maximum last 3): Results from [facility] Encounter encounter on 02/05/15 CT SINUSES WO CONT Justus Pass Narrative **Final Report** 
 
 
ICD Codes / Adm. Diagnosis: 784.0  470 / Headache  Deviated nasal septum Examination:  CT SINUS [facility] WO CON  - [id number] - Feb 5 2015  1:31PM 
Accession No:  [id number] Reason:  Headache; severe septal deviation REPORT: 
HISTORY: Chronic sinusitis. PROCEDURE:  
 
Multiple contiguous helically acquired axial images were obtained through  
the paranasal sinuses. Coronal and sagittal reconstructions were performed. FINDINGS: 
 
The frontal sinuses are well pneumatized. The ethmoid air cells are well pneumatized. The osteomeatal complexes are generally narrowed but patent. Images through the maxillary sinuses reveal a small mucus retention cyst in  
the left maxillary sinus. The sphenoid sinuses are well pneumatized. The bony nasal septum is severely deviated towards right. No air-fluid level, bony expansile or emily destructive process are  
identified. IMPRESSION: 
 
Severe deviation of nasal septum toward the right. Generally narrowed but patent ostiomeatal complexes. Small mucus retention cyst in the left maxillary sinus. Signing/Reading Doctor: Kirby Conner ([id number]) Nina Santillanon ([id number])  Feb 5 2015  2:55PM                   
 
 
   
 
 
MRI Results (maximum last 3): Results from Abstract encounter on 10/10/14 MRI BRAIN W WO CONT 
 
PET Results (maximum last 3): No results found for this or any previous visit. Assessment and Plan Keri Driscoll was seen today for concussion and migraine. Diagnoses and all orders for this visit: 
 
New onset headache Concussion, without loss of consciousness, initial encounter Chronic migraine w/o aura w/o status migrainosus, not intractable Tremor of left hand -     MRI BRAIN W WO CONT; Future -     MRI CERV SPINE WO CONT; Future History of multiple concussions History of difficulty in concentrating Radicular pain in left arm -     MRI CERV SPINE WO CONT; Future Arm numbness left -     MRI BRAIN W WO CONT; Future -     MRI CERV SPINE WO CONT; Future Other orders 
-     magnesium oxide (MAG-OX) 400 mg tablet; Take 1 Tab by mouth nightly. 39-year-old gentleman with multiple chronic conditions. 1.  He had a concussion in June 2016. He has had multiple concussions. He has an abnormal brain MRI. He still having postconcussive symptoms in the form of decreased focus and concentration and daily headache. Sleep is delaying recovery. Agree with plans for sleep study. Start magnesium nightly. He is hesitant to add any further medication while he is titrating venlafaxine which is reasonable. Also obtain MRI brain. He has a history of an abnormal study in 2012. He also has this change in headache from his baseline. Headaches are usually left-sided but now they are right midline parietal. 
 
2.  He meets criteria for chronic migraine. He has more than 15 days of headache pain per month lasting more than 4 hours each. He is already been on amitriptyline, verapamil, and gabapentin without benefit. We will refer for Botox injections. 3.  He has left arm numbness and pain in a radicular distribution. He is already receiving physical therapy for this. Exam is abnormal with notable hyperreflexia and increased tone. Recommend an MRI C-spine be done to further evaluate this. 4. the left hand tremor is noticeable. There is some slight increase rigidity. Follow this for now. Family history for Parkinson's. No other typical signs or symptoms for Parkinson's at this time. Seems essential in origin but could have pathology. I discussed with him that he needs to work on optimizing his sleep as much as possible. Poor sleep will make his above conditions refractory to treatment. I would like to see him in 3 months. If any of the above evaluation is acutely abnormal we will see him sooner. Continue physical therapy for now. Thank you for giving me the opportunity to assist in the care of Mr. Drea Garibay. If you have questions, please do not hesitate to contact me. Sincerely, [address] [facility], DO Neurologist 
Dipl[employer]ate [employer]

## 2017-01-22 ENCOUNTER — HOSPITAL ENCOUNTER (OUTPATIENT)
Dept: MRI IMAGING | Age: 47
Discharge: HOME | End: 2017-01-22
Attending: PSYCHIATRY & NEUROLOGY
Payer: COMMERCIAL

## 2017-01-22 DIAGNOSIS — R20.0 ARM NUMBNESS LEFT: ICD-10-CM

## 2017-01-22 DIAGNOSIS — R25.1 TREMOR OF LEFT HAND: ICD-10-CM

## 2017-01-22 DIAGNOSIS — M79.2 RADICULAR PAIN IN LEFT ARM: ICD-10-CM

## 2017-01-22 LAB — CREAT BLD-MCNC: 0.8 MG/DL (ref 0.6–1.3)

## 2017-01-22 PROCEDURE — 72141 MRI NECK SPINE W/O DYE: CPT

## 2017-01-22 PROCEDURE — 82565 ASSAY OF CREATININE: CPT

## 2017-01-22 PROCEDURE — A9585 GADOBUTROL INJECTION: HCPCS | Performed by: PSYCHIATRY & NEUROLOGY

## 2017-01-22 PROCEDURE — 74011250636 HC RX REV CODE- 250/636: Performed by: PSYCHIATRY & NEUROLOGY

## 2017-01-22 PROCEDURE — 70553 MRI BRAIN STEM W/O & W/DYE: CPT

## 2017-01-22 RX ORDER — SODIUM CHLORIDE 0.9 % (FLUSH) 0.9 %
10 SYRINGE (ML) INJECTION
Status: COMPLETED | OUTPATIENT
Start: 2017-01-22 | End: 2017-01-22

## 2017-01-22 RX ADMIN — GADOBUTROL 10 ML: 604.72 INJECTION INTRAVENOUS at 09:00

## 2017-01-22 RX ADMIN — Medication 10 ML: at 09:00

## 2017-02-02 ENCOUNTER — TELEPHONE (OUTPATIENT)
Dept: NEUROLOGY | Age: 47
End: 2017-02-02

## 2017-02-03 ENCOUNTER — TELEPHONE (OUTPATIENT)
Dept: NEUROLOGY | Age: 47
End: 2017-02-03

## 2017-02-03 NOTE — TELEPHONE ENCOUNTER
Attempted to contact patient regarding MRI results, no answer. Left message to return call to office.

## 2017-02-16 ENCOUNTER — TELEPHONE (OUTPATIENT)
Dept: NEUROLOGY | Age: 47
End: 2017-02-16

## 2017-02-16 NOTE — TELEPHONE ENCOUNTER
Pt's shaking is getting more frequent. He is scheduled for a f/u on April 12. He was wondering if he should be seen sooner.  Please give him a call back

## 2017-02-17 ENCOUNTER — TELEPHONE (OUTPATIENT)
Dept: NEUROLOGY | Age: 47
End: 2017-02-17

## 2017-02-21 ENCOUNTER — TELEPHONE (OUTPATIENT)
Dept: NEUROLOGY | Age: 47
End: 2017-02-21

## 2017-02-21 NOTE — TELEPHONE ENCOUNTER
Pt's shaking has gotten worse. MRI done a couple weeks ago and he would like to know if he can come in earlier. Pt will not be available after 3:30pm today and he will be in meetings from 8-11:30am. Please give him a call back.

## 2017-02-22 NOTE — TELEPHONE ENCOUNTER
Spoke with patient reports has more frequent shaking and fine motor movement. Request MRI results, and should he come to a sooner appointment.

## 2017-03-07 ENCOUNTER — TELEPHONE (OUTPATIENT)
Dept: NEUROLOGY | Age: 47
End: 2017-03-07

## 2017-03-07 NOTE — TELEPHONE ENCOUNTER
Pt said his tremors are getting so bad that he cannot sleep at night. Also, he has fallen twice last week because his balance is so bad.  He also has some questions about  Please give pt a call back

## 2017-03-09 NOTE — TELEPHONE ENCOUNTER
Spoke with patient reports has fallen twice in last month and and tremors has gotten worse. Patient reported concussion Physician notes patient to return to neurology. Patient request any referral and or to be seen sooner. Please advise.

## 2017-03-14 ENCOUNTER — OFFICE VISIT (OUTPATIENT)
Dept: NEUROLOGY | Age: 47
End: 2017-03-14

## 2017-03-14 VITALS
HEART RATE: 87 BPM | OXYGEN SATURATION: 98 % | DIASTOLIC BLOOD PRESSURE: 80 MMHG | SYSTOLIC BLOOD PRESSURE: 120 MMHG | RESPIRATION RATE: 18 BRPM

## 2017-03-14 DIAGNOSIS — R26.9 GAIT DISTURBANCE: ICD-10-CM

## 2017-03-14 DIAGNOSIS — R25.1 TREMOR OF LEFT HAND: Primary | ICD-10-CM

## 2017-03-14 DIAGNOSIS — G43.709 CHRONIC MIGRAINE W/O AURA W/O STATUS MIGRAINOSUS, NOT INTRACTABLE: ICD-10-CM

## 2017-03-14 NOTE — PROGRESS NOTES
Chief Complaint   Patient presents with    Migraine       HPI    Mikel Vick is a 58-year-old man here in followup. I saw him for complaints of tremor and headache. Since his last visit he completed an MRI brain which was completely normal. C-spine was done showing some mild right-sided changes. I examined him at that time he had some slight rigidity and tremor of the left upper extremity. He tells me that is progressing now to involve the right hand. He also tells me he had some stumbling of his gait but did not fall recently. Sleep is still inconsistent. Still has headaches but some improvement after PT. He is worried that he is progressing further neurologically without etiology. He is still taking Effexor. Headaches are still daily. Review of Systems   Musculoskeletal: Positive for back pain and neck pain. Neurological: Positive for tremors and headaches. Psychiatric/Behavioral: Positive for memory loss. The patient is nervous/anxious and has insomnia. All other systems reviewed and are negative. Past Medical History:   Diagnosis Date    Allergic rhinitis 4/8/2012    Asthma 4/8/2012    Asthma     Cancer ([facility] [address]     skin ca exision from scalp w/ subsequent  local numbness     HX OTHER MEDICAL     rt shoulder posterior instability     Migraine 4/8/2012    Sciatica     Sinus headache 4/8/2012    Tension headache 4/8/2012    Trauma     nasal fracture, 3 concussions, soft ball eye injury     Trauma 06/2016    concussion . dislocated jaw . eval'd by ENT . Dr. Chelsea Kim and Dentist      History reviewed. No pertinent family history. Social History     Social History    Marital status:      Spouse name: N/A    Number of children: N/A    Years of education: N/A     Occupational History    Not on file.      Social History Main Topics    Smoking status: Never Smoker    Smokeless tobacco: Not on file    Alcohol use Not on file    Drug use: Not on file    Sexual activity: Not on file     Other Topics Concern    Not on file     Social History Narrative     Allergies   Allergen Reactions    Amitriptyline Other (comments)     Mental slowing     Gabapentin Other (comments)     Weight gain          Current Outpatient Prescriptions   Medication Sig    IBUPROFEN (ADVIL PO) Take  by mouth.  aspirin-acetaminophen-caffeine (EXCEDRIN ES) 250-250-65 mg per tablet Take 1 Tab by mouth.  venlafaxine (EFFEXOR) 75 mg tablet Take 25 mg by mouth three (3) times daily.  DIPHENHYDRAMINE HCL (BENADRYL PO) Take  by mouth.  magnesium oxide (MAG-OX) 400 mg tablet Take 1 Tab by mouth nightly.  fluticasone (FLONASE) 50 mcg/actuation nasal spray 1 [place] by Both Nostrils route two (2) times a day.  amLODIPine-benazepril (LOTREL) 5-10 mg per capsule TAKE 1 CAPSULE DAILY    diclofenac EC (VOLTAREN) 50 mg EC tablet Take  by mouth two (2) times a day.  montelukast (SINGULAIR) 10 mg tablet Take 10 mg by mouth daily.  Cetirizine (ZYRTEC) 10 mg Cap Take  by mouth.  CHLORZOXAZONE (LORZONE PO) Take  by mouth. 1/2 tab bid .  albuterol (PROVENTIL HFA, VENTOLIN HFA) 90 mcg/actuation inhaler Take 2 Puffs by inhalation every four (4) hours as needed. No current facility-administered medications for this visit. Neurologic Exam     Mental Status        WD/WN adult in NAD, normal grooming  VSS  A&O x 3    PERRL, nonicteric  Face is symmetric, tongue midline  Speech is fluent and clear  No limb ataxia. No abnl movements. Moving all extemities spontaneously and symmetric  Normal gait    CVS RRR  Lungs nonlabored  Skin is warm and dry         Visit Vitals    /80    Pulse 87    Resp 18    SpO2 98%       Assessment and Plan   Dasha Santos was seen today for migraine.     Diagnoses and all orders for this visit:    Tremor of left hand  -     REFERRAL TO NUCLEAR MEDICINE    Gait disturbance  -     REFERRAL TO NUCLEAR MEDICINE    Chronic migraine w/o aura w/o status migrainosus, not intractable    44-year-old gentleman with multiple chronic conditions that seem to be progressing. I am strongly suspicious for worsening anxiety and chronic sleep deprivation. He does have some abnormalities subtly on exam. Given the tremor that seems to be progressing I think it is reasonable to obtain a ISADORA scan to help distinguish between another process such as parkinsonism which would change our management. He is not displaying a typical overt signs for Parkinson's disease at this time. We will do Botox injections for the headaches once we have that our office. He meets criteria for chronic migraine. He has been on multiple prophylactics without benefit. I again emphasized that the quality of his sleep needs to improve. Chronic sleep inconsistently  and poor quality will lead to worsening symptoms including memory loss and anxiety. Continue magnesium nightly. He has a significant amount of anxiety. This may also be contributing. May need to titrate effexor after the ISADORA scan. I will see him in about a month and for Botox injections prior to that if we receive it. I reviewed and decided to continue the current medications.       [facility], [address]  Diplomate KAVYAN

## 2017-03-14 NOTE — PATIENT INSTRUCTIONS
10 [facility]   Statement to Patients  April 1, 2014      In an effort to ensure the large volume of patient prescription refills is processed in the most efficient and expeditious manner, we are asking our patients to assist us by calling your Pharmacy for all prescription refills, this will include also your  Mail Order Pharmacy. The pharmacy will contact our office electronically to continue the refill process. Please do not wait until the last minute to call your pharmacy. We need at least 48 hours (2days) to fill prescriptions. We also encourage you to call your pharmacy before going to  your prescription to make sure it is ready. With regard to controlled substance prescription refill requests (narcotic refills) that need to be picked up at our office, we ask your cooperation by providing us with at least 72 hours (3days) notice that you will need a refill. We will not refill narcotic prescription refill requests after 4:00pm on any weekday, Monday through Thursday, or after 2:00pm on Fridays, or on the weekends. We encourage everyone to explore another way of getting your prescription refill request processed using [employer], our patient web portal through our electronic medical record system. [employer] is an efficient and effective way to communicate your medication request directly to the office and  downloadable as an mily on your smart phone . [employer] also features a review functionality that allows you to view your medication list as well as leave messages for your physician. Are you ready to get connected? If so please review the attatched instructions or speak to any of our staff to get you set up right away! Thank you so much for your cooperation. Should you have any questions please contact our Practice Administrator.     The Physicians and Staff,  [facility]     Thank you for choosing [facility] for your     care. You may receive a survey about your visit. We appreciate you taking time     to complete this survey as we use your feedback to improve our services. We     realize we are not perfect, but we strive to provide excellent care.

## 2017-03-23 ENCOUNTER — TELEPHONE (OUTPATIENT)
Dept: NEUROLOGY | Age: 47
End: 2017-03-23

## 2017-04-12 ENCOUNTER — TELEPHONE (OUTPATIENT)
Dept: NEUROLOGY | Age: 47
End: 2017-04-12

## 2017-04-12 ENCOUNTER — OFFICE VISIT (OUTPATIENT)
Dept: NEUROLOGY | Age: 47
End: 2017-04-12

## 2017-04-12 VITALS
TEMPERATURE: 97.6 F | DIASTOLIC BLOOD PRESSURE: 80 MMHG | OXYGEN SATURATION: 98 % | RESPIRATION RATE: 18 BRPM | SYSTOLIC BLOOD PRESSURE: 120 MMHG | HEART RATE: 76 BPM

## 2017-04-12 DIAGNOSIS — G43.709 CHRONIC MIGRAINE W/O AURA W/O STATUS MIGRAINOSUS, NOT INTRACTABLE: Primary | ICD-10-CM

## 2017-04-12 DIAGNOSIS — R26.9 GAIT DISTURBANCE: ICD-10-CM

## 2017-04-12 DIAGNOSIS — G43.719 INTRACTABLE CHRONIC MIGRAINE WITHOUT AURA AND WITHOUT STATUS MIGRAINOSUS: ICD-10-CM

## 2017-04-12 DIAGNOSIS — Z87.820 HISTORY OF MULTIPLE CONCUSSIONS: ICD-10-CM

## 2017-04-12 DIAGNOSIS — R25.1 TREMOR OF LEFT HAND: ICD-10-CM

## 2017-04-12 NOTE — MR AVS SNAPSHOT
Visit Information Date & Time Provider Department Dept. Phone Encounter #  
 4/12/2017 11:40 AM Clenton Cinnamon Martin Rubinstein, DO [facility] at [address] [id number] Upcoming Health Maintenance Date Due Pneumococcal 19-64 Medium Risk (1 of 1 - PPSV23) 11/19/1988 DTaP/Tdap/Td series (1 - Tdap) 5/21/2005 INFLUENZA AGE 9 TO ADULT 8/1/2016 Allergies as of 4/12/2017  Review Complete On: 4/12/2017 By: Agustin Boudreaux LPN Severity Noted Reaction Type Reactions Amitriptyline  12/09/2016    Other (comments) Mental slowing Gabapentin  12/09/2016    Other (comments) Weight gain Current Immunizations  Reviewed on 11/4/2013 Name Date Td 5/20/2005 Not reviewed this visit You Were Diagnosed With   
  
 Codes Comments Chronic migraine w/o aura w/o status migrainosus, not intractable    -  Primary ICD-10-CM: Q18.129 ICD-9-CM: 346.70 Tremor of left hand     ICD-10-CM: R25.1 ICD-9-CM: 781.0 Gait disturbance     ICD-10-CM: R26.9 ICD-9-CM: 781.2 History of multiple concussions     ICD-10-CM: Z87.820 ICD-9-CM: V15.52 Vitals BP Pulse Temp Resp SpO2 Smoking Status 120/80 76 97.6 °F (36.4 °C) 18 98% Never Smoker Preferred Pharmacy Pharmacy Name Phone [address] [facility] [phone] Your Updated Medication List  
  
   
This list is accurate as of: 4/12/17 12:17 PM.  Always use your most recent med list. ADVIL PO Take  by mouth. albuterol 90 mcg/actuation inhaler Commonly known as:  PROVENTIL HFA, VENTOLIN HFA, PROAIR HFA Take 2 Puffs by inhalation every four (4) hours as needed. amLODIPine-benazepril 5-10 mg per capsule Commonly known as:  LOTREL  
TAKE 1 CAPSULE DAILY  
  
 aspirin-acetaminophen-caffeine 250-250-65 mg per tablet Commonly known as:  EXCEDRIN ES Take 1 Tab by mouth. BENADRYL PO Take  by mouth. diclofenac EC 50 mg EC tablet Commonly known as:  VOLTAREN Take  by mouth two (2) times a day. fluticasone 50 mcg/actuation nasal spray Commonly known as:  FLONASE  
1 [place] by Both Nostrils route two (2) times a day. LORZONE PO Take  by mouth. 1/2 tab bid .  
  
 magnesium oxide 400 mg tablet Commonly known as:  MAG-OX Take 1 Tab by mouth nightly. SINGULAIR 10 mg tablet Generic drug:  montelukast  
Take 10 mg by mouth daily. venlafaxine 75 mg tablet Commonly known as:  [facility] Take 25 mg by mouth three (3) times daily. ZyrTEC 10 mg Cap Generic drug:  Cetirizine Take  by mouth. Patient Instructions PRESCRIPTION REFILL POLICY [facility] Statement to Patients April 1, 2014 In an effort to ensure the large volume of patient prescription refills is processed in the most efficient and expeditious manner, we are asking our patients to assist us by calling your Pharmacy for all prescription refills, this will include also your  Mail Order Pharmacy. The pharmacy will contact our office electronically to continue the refill process. Please do not wait until the last minute to call your pharmacy. We need at least 48 hours (2days) to fill prescriptions. We also encourage you to call your pharmacy before going to  your prescription to make sure it is ready. With regard to controlled substance prescription refill requests (narcotic refills) that need to be picked up at our office, we ask your cooperation by providing us with at least 72 hours (3days) notice that you will need a refill. We will not refill narcotic prescription refill requests after 4:00pm on any weekday, Monday through Thursday, or after 2:00pm on Fridays, or on the weekends.   
  
We encourage everyone to explore another way of getting your prescription refill request processed using [employer], our patient web portal through our electronic medical record system. [address] [employer] is an efficient and effective way to communicate your medication request directly to the office and  downloadable as an mily on your smart phone . [employer] also features a review functionality that allows you to view your medication list as well as leave messages for your physician. Are you ready to get connected? If so please review the attatched instructions or speak to any of our staff to get you set up right away! Thank you so much for your cooperation. Should you have any questions please contact our Practice Administrator. The Physicians and Staff,  [facility] Thank you for choosing [facility] for your  
 
care. You may receive a survey about your visit. We appreciate you taking time  
 
to complete this survey as we use your feedback to improve our services. We  
 
realize we are not perfect, but we strive to provide excellent care. Introducing [facility]! Liliana Goss introduces [employer] patient portal. Now you can access parts of your medical record, email your doctor's office, and request medication refills online. 1. In your internet browser, go to [email or web address] 2. Click on the First Time User? Click Here link in the Sign In box. You will see the New Member Sign Up page. 3. Enter your [employer] Access Code exactly as it appears below. You will not need to use this code after youve completed the sign-up process. If you do not sign up before the expiration date, you must request a new code. · [employer] Access Code: [email or web address] Expires: 7/11/2017 11:19 AM 
 
4. Enter the last four digits of your Social Security Number (xxxx) and Date of Birth (mm/dd/yyyy) as indicated and click Submit. You will be taken to the next sign-up page. 5. Create a [employer] ID. This will be your [employer] login ID and cannot be changed, so think of one that is secure and easy to remember. 6. Create a [employer] password. You can change your password at any time. 7. Enter your Password Reset Question and Answer. This can be used at a later time if you forget your password. 8. Enter your e-mail address. You will receive e-mail notification when new information is available in [address] 9. Click Sign Up. You can now view and download portions of your medical record. 10. Click the Download Summary menu link to download a portable copy of your medical information. If you have questions, please visit the Frequently Asked Questions section of the [employer] website. Remember, [employer] is NOT to be used for urgent needs. For medical emergencies, dial 911. Now available from your iPhone and Android! Please provide this summary of care documentation to your next provider. Your primary care clinician is listed as [id number] [facility]. If you have any questions after today's visit, please call [phone].

## 2017-04-12 NOTE — PATIENT INSTRUCTIONS
10 [facility]   Statement to Patients  April 1, 2014      In an effort to ensure the large volume of patient prescription refills is processed in the most efficient and expeditious manner, we are asking our patients to assist us by calling your Pharmacy for all prescription refills, this will include also your  Mail Order Pharmacy. The pharmacy will contact our office electronically to continue the refill process. Please do not wait until the last minute to call your pharmacy. We need at least 48 hours (2days) to fill prescriptions. We also encourage you to call your pharmacy before going to  your prescription to make sure it is ready. With regard to controlled substance prescription refill requests (narcotic refills) that need to be picked up at our office, we ask your cooperation by providing us with at least 72 hours (3days) notice that you will need a refill. We will not refill narcotic prescription refill requests after 4:00pm on any weekday, Monday through Thursday, or after 2:00pm on Fridays, or on the weekends. We encourage everyone to explore another way of getting your prescription refill request processed using [employer], our patient web portal through our electronic medical record system. [employer] is an efficient and effective way to communicate your medication request directly to the office and  downloadable as an mily on your smart phone . [employer] also features a review functionality that allows you to view your medication list as well as leave messages for your physician. Are you ready to get connected? If so please review the attatched instructions or speak to any of our staff to get you set up right away! Thank you so much for your cooperation. Should you have any questions please contact our Practice Administrator.     The Physicians and Staff,  [facility]     Thank you for choosing [facility] for your     care. You may receive a survey about your visit. We appreciate you taking time     to complete this survey as we use your feedback to improve our services. We     realize we are not perfect, but we strive to provide excellent care.

## 2017-04-12 NOTE — PROGRESS NOTES
Botox Injection Note     2017     Patient:  Evonne Amaya   YOB: 1970  Date of Visit: 2017      Indication: patient has chronic migraine headaches greater than 15 days per month lasting more than 4 hours each. She has failed or not tolerated 2 or more medication preventatives. Written consent was signed and verified by me. Risks and benefits were discussed to include possible cosmetic asymmetry which is not permament or life threatening. Patient name and  was confirmed prior to procedure. Time out performed. Procedure:   Botox concentration: 200 units in 2 ml of preservative-free normal saline. 1:1 dilution. LOT#:  [id number]  EXP:  OCT 2019    Injections and distribution as follow :      Units/site  Sites Loc Subtotal    procerus 5 1 ML 5   corrugaters 2.5 2 BL 5   frontalis 5 8 BL 40   Temporalis 10 4 BL 40   Occipitalis 10 2 BL 20   Cervical paraspinals 5 4 BL 20   Trapezius 10 4 BL 40         200 units Botox were reconstituted, 170 units injected as above and the remainder was unavoidably wasted. Patient tolerated procedure well. Return in 3 months for repeat injections.     _____________________________   Sejal Peters, DO  [facility], ABPN

## 2017-04-19 ENCOUNTER — TELEPHONE (OUTPATIENT)
Dept: NEUROLOGY | Age: 47
End: 2017-04-19

## 2017-04-21 ENCOUNTER — TELEPHONE (OUTPATIENT)
Dept: NEUROLOGY | Age: 47
End: 2017-04-21

## 2017-04-21 NOTE — TELEPHONE ENCOUNTER
----- Message from Georgie Napier sent at 4/21/2017 11:22 AM EDT -----  Regarding: FW: Dr. Chen Gomez: [phone]      ----- Message -----     From: Todd Wolff     Sent: 4/21/2017   9:47 AM       To: Annemarie  Front Office  Subject: Dr. Alma Gupta with [facility] called and stated that she needs to get additional information from the practice in order to get the pt scheduled for the testing that the doctor for him to get there, She stated that they need the most current Medications list along with and  to be notified as well. Her best contact number is [phone] option 1.

## 2017-05-30 ENCOUNTER — TELEPHONE (OUTPATIENT)
Dept: NEUROLOGY | Age: 47
End: 2017-05-30

## 2017-06-01 NOTE — TELEPHONE ENCOUNTER
Spoke with Marylin Rene, with [address]. Rx for Botox, telephone order received. Pharmacy to contact patient for consent to ship.

## 2017-06-06 ENCOUNTER — TELEPHONE (OUTPATIENT)
Dept: NEUROLOGY | Age: 47
End: 2017-06-06

## 2017-06-09 ENCOUNTER — OFFICE VISIT (OUTPATIENT)
Dept: NEUROLOGY | Age: 47
End: 2017-06-09

## 2017-06-09 VITALS
RESPIRATION RATE: 18 BRPM | SYSTOLIC BLOOD PRESSURE: 142 MMHG | WEIGHT: 225 LBS | BODY MASS INDEX: 30.94 KG/M2 | DIASTOLIC BLOOD PRESSURE: 82 MMHG | OXYGEN SATURATION: 98 % | HEART RATE: 80 BPM

## 2017-06-09 DIAGNOSIS — R26.89 BALANCE DISORDER: ICD-10-CM

## 2017-06-09 DIAGNOSIS — G25.0 ESSENTIAL TREMOR: ICD-10-CM

## 2017-06-09 DIAGNOSIS — M25.511 CHRONIC PAIN OF BOTH SHOULDERS: ICD-10-CM

## 2017-06-09 DIAGNOSIS — G89.29 CHRONIC PAIN OF BOTH SHOULDERS: ICD-10-CM

## 2017-06-09 DIAGNOSIS — M25.512 CHRONIC PAIN OF BOTH SHOULDERS: ICD-10-CM

## 2017-06-09 DIAGNOSIS — G43.709 CHRONIC MIGRAINE W/O AURA W/O STATUS MIGRAINOSUS, NOT INTRACTABLE: Primary | ICD-10-CM

## 2017-06-09 RX ORDER — PROPRANOLOL HYDROCHLORIDE 10 MG/1
10 TABLET ORAL 3 TIMES DAILY
Qty: 90 TAB | Refills: 3 | Status: SHIPPED | OUTPATIENT
Start: 2017-06-09 | End: 2018-07-11

## 2017-06-09 RX ORDER — VENLAFAXINE HYDROCHLORIDE 150 MG/1
150 CAPSULE, EXTENDED RELEASE ORAL DAILY
Qty: 90 CAP | Refills: 1 | Status: SHIPPED | OUTPATIENT
Start: 2017-06-09 | End: 2017-11-30 | Stop reason: SDUPTHER

## 2017-06-09 NOTE — MR AVS SNAPSHOT
Visit Information Date & Time Provider Department Dept. Phone Encounter #  
 6/9/2017 10:15  [facility] [address] [id number] Follow-up Instructions Return in about 3 months (around 9/9/2017). Routing History Upcoming Health Maintenance Date Due Pneumococcal 19-64 Medium Risk (1 of 1 - PPSV23) 11/19/1988 DTaP/Tdap/Td series (1 - Tdap) 5/21/2005 INFLUENZA AGE 9 TO ADULT 8/1/2017 Allergies as of 6/9/2017  Review Complete On: 6/9/2017 By: Carla Rivera LPN Severity Noted Reaction Type Reactions Amitriptyline  12/09/2016    Other (comments) Mental slowing Gabapentin  12/09/2016    Other (comments) Weight gain Current Immunizations  Reviewed on 11/4/2013 Name Date Td 5/20/2005 Not reviewed this visit You Were Diagnosed With   
  
 Codes Comments Chronic migraine w/o aura w/o status migrainosus, not intractable    -  Primary ICD-10-CM: O40.927 ICD-9-CM: 346.70 Essential tremor     ICD-10-CM: G25.0 ICD-9-CM: 333.1 Balance disorder     ICD-10-CM: R26.89 
ICD-9-CM: 781.99 Chronic pain of both shoulders     ICD-10-CM: M25.512, G89.29, M25.511 ICD-9-CM: 719.41, 338.29 Vitals BP Pulse Resp Weight(growth percentile) SpO2 BMI  
 142/82 80 18 225 lb (102.1 kg) 98% 30.94 kg/m2 Smoking Status Never Smoker BMI and BSA Data Body Mass Index Body Surface Area 30.94 kg/m 2 2.27 m 2 Preferred Pharmacy Pharmacy Name Phone [address], [facility] [phone] Your Updated Medication List  
  
   
This list is accurate as of: 6/9/17 10:41 AM.  Always use your most recent med list. ADVIL PO Take  by mouth. albuterol 90 mcg/actuation inhaler Commonly known as:  PROVENTIL HFA, VENTOLIN HFA, PROAIR HFA  
 Take 2 Puffs by inhalation every four (4) hours as needed. amLODIPine-benazepril 5-10 mg per capsule Commonly known as:  LOTREL  
TAKE 1 CAPSULE DAILY (NEED APPOINTMENT)  
  
 aspirin-acetaminophen-caffeine 250-250-65 mg per tablet Commonly known as:  EXCEDRIN ES Take 1 Tab by mouth. BENADRYL PO Take  by mouth. diclofenac EC 50 mg EC tablet Commonly known as:  VOLTAREN Take  by mouth two (2) times a day. fluticasone 50 mcg/actuation nasal spray Commonly known as:  FLONASE  
1 [place] by Both Nostrils route two (2) times a day. LORZONE PO Take  by mouth. 1/2 tab bid .  
  
 magnesium oxide 400 mg tablet Commonly known as:  MAG-OX Take 1 Tab by mouth nightly. propranolol 10 mg tablet Commonly known as:  INDERAL Take 1 Tab by mouth three (3) times daily. SINGULAIR 10 mg tablet Generic drug:  montelukast  
Take 10 mg by mouth daily. venlafaxine- mg capsule Commonly known as:  EFFEXOR-XR Take 1 Cap by mouth daily. ZyrTEC 10 mg Cap Generic drug:  Cetirizine Take  by mouth. Prescriptions Printed Refills  
 propranolol (INDERAL) 10 mg tablet 3 Sig: Take 1 Tab by mouth three (3) times daily. Class: Print Route: Oral  
  
Prescriptions Sent to Pharmacy Refills  
 venlafaxine-SR (EFFEXOR-XR) 150 mg capsule 1 Sig: Take 1 Cap by mouth daily. Class: Normal  
 Pharmacy: [address], [address] Ph #: [phone] Route: Oral  
  
We Performed the Following REFERRAL TO PAIN MANAGEMENT [WAS690 Custom] REFERRAL TO PHYSICAL THERAPY [LLQ10 Custom] Follow-up Instructions Return in about 3 months (around 9/9/2017). Referral Information Referral ID Referred By Referred To  
  
 [id number] BRISEYDA, [address] [facility] [address], [address] Visits Status Start Date End Date 1 New Request 6/9/17 6/9/18 If your referral has a status of pending review or denied, additional information will be sent to support the outcome of this decision. Referral ID Referred By Referred To  
 [id number] Richy RAIN Forward, PT, DPT  
   [address] [facility], [address] Final Phone: [phone] Fax: [phone] Visits Status Start Date End Date 1 New Request 6/9/17 6/9/18 If your referral has a status of pending review or denied, additional information will be sent to support the outcome of this decision. Introducing [facility]! [place] introduces [employer]itt patient portal. Now you can access parts of your medical record, email your doctor's office, and request medication refills online. 1. In your internet browser, go to [email or web address]. [employer] 2. Click on the First Time User? Click Here link in the Sign In box. You will see the New Member Sign Up page. 3. Enter your [place] Access Code exactly as it appears below. You will not need to use this code after youve completed the sign-up process. If you do not sign up before the expiration date, you must request a new code. · Docitt Access Code: [email or web address] Expires: 7/11/2017 11:19 AM 
 
4. Enter the last four digits of your Social Security Number (xxxx) and Date of Birth (mm/dd/yyyy) as indicated and click Submit. You will be taken to the next sign-up page. 5. Create a [place] ID. This will be your Docitt login ID and cannot be changed, so think of one that is secure and easy to remember. 6. Create a Docitt password. You can change your password at any time. 7. Enter your Password Reset Question and Answer. This can be used at a later time if you forget your password. 8. Enter your e-mail address. You will receive e-mail notification when new information is available in [address] 9. Click Sign Up. You can now view and download portions of your medical record. 10. Click the Download Summary menu link to download a portable copy of your medical information. If you have questions, please visit the Frequently Asked Questions section of the [employer] website. Remember, [employer] is NOT to be used for urgent needs. For medical emergencies, dial 911. Now available from your iPhone and Android! Please provide this summary of care documentation to your next provider. Your primary care clinician is listed as [address] [facility]. If you have any questions after today's visit, please call [phone].

## 2017-06-09 NOTE — PROGRESS NOTES
Chief Complaint   Patient presents with    Tremors    Headache       HPI    Chica Huggins is a 62-year-old gentleman here to follow-up. I have been evaluating him for fluctuating extremity tremors, history of concussion, headache. Since his last visit he completed a nuclear ISADORA scan just yesterday which was very normal.  There is no evidence to suggest parkinsonian or Lewy body disease on imaging. Unfortunately he still has fluctuating tremor in the upper extremities sometimes very obvious but there are times where the tremor is completely absent. He is still very high functioning and plays outdoor athletics at least twice a week. He is very engaged in physical fitness. 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 Unfortunately he still complains of bilateral shoulder pain and particularly after a lot of physical activity. He has been taking diclofenac twice daily for several months. It is not being very effective. Additionally he is tolerating Effexor. His stress and depression and anxiety are improved albeit there is room for further improvement. Still having daily headaches. He had Botox injections in April. He is not sure if it helped. Review of Systems   Musculoskeletal: Positive for myalgias. Neurological: Positive for tremors and headaches. Psychiatric/Behavioral: The patient is nervous/anxious. All other systems reviewed and are negative. Past Medical History:   Diagnosis Date    Allergic rhinitis 4/8/2012    Asthma 4/8/2012    Asthma     Cancer ([facility] [address]     skin ca exision from scalp w/ subsequent  local numbness     HX OTHER MEDICAL     rt shoulder posterior instability     Migraine 4/8/2012    Sciatica     Sinus headache 4/8/2012    Tension headache 4/8/2012    Trauma     nasal fracture, 3 concussions, soft ball eye injury     Trauma 06/2016    concussion . dislocated jaw . eval'd by ENT .  Dr. Aleksander Perkins and Dentist      No family history on file. Social History     Social History    Marital status:      Spouse name: N/A    Number of children: N/A    Years of education: N/A     Occupational History    Not on file. Social History Main Topics    Smoking status: Never Smoker    Smokeless tobacco: Not on file    Alcohol use Not on file    Drug use: Not on file    Sexual activity: Not on file     Other Topics Concern    Not on file     Social History Narrative     Allergies   Allergen Reactions    Amitriptyline Other (comments)     Mental slowing     Gabapentin Other (comments)     Weight gain          Current Outpatient Prescriptions   Medication Sig    venlafaxine-SR (EFFEXOR-XR) 150 mg capsule Take 1 Cap by mouth daily.  propranolol (INDERAL) 10 mg tablet Take 1 Tab by mouth three (3) times daily.  amLODIPine-benazepril (LOTREL) 5-10 mg per capsule TAKE 1 CAPSULE DAILY (NEED APPOINTMENT)    IBUPROFEN (ADVIL PO) Take  by mouth.  aspirin-acetaminophen-caffeine (EXCEDRIN ES) 250-250-65 mg per tablet Take 1 Tab by mouth.  DIPHENHYDRAMINE HCL (BENADRYL PO) Take  by mouth.  magnesium oxide (MAG-OX) 400 mg tablet Take 1 Tab by mouth nightly.  fluticasone (FLONASE) 50 mcg/actuation nasal spray 1 [place] by Both Nostrils route two (2) times a day.  diclofenac EC (VOLTAREN) 50 mg EC tablet Take  by mouth two (2) times a day.  montelukast (SINGULAIR) 10 mg tablet Take 10 mg by mouth daily.  Cetirizine (ZYRTEC) 10 mg Cap Take  by mouth.  albuterol (PROVENTIL HFA, VENTOLIN HFA) 90 mcg/actuation inhaler Take 2 Puffs by inhalation every four (4) hours as needed.  CHLORZOXAZONE (LORZONE PO) Take  by mouth. 1/2 tab bid . No current facility-administered medications for this visit. Neurologic Exam     Mental Status        WD/WN adult in NAD, normal grooming  VSS  A&O x 3, very articulate and well spoken. Normal facial movements.     PERRL, nonicteric  Face is symmetric, tongue midline  Speech is fluent and clear  No limb ataxia. No abnl movements. Moving all extemities spontaneously and symmetric  Normal gait  Fluctuating tremor right hand sometimes presents with a resting tremor but then resolves spontaneously. Less so on the left. No rigidity. No bradykinesia. CVS RRR  Lungs nonlabored  Skin is warm and dry         Visit Vitals    /82    Pulse 80    Resp 18    Wt 102.1 kg (225 lb)    SpO2 98%    BMI 30.94 kg/m2       Assessment and Plan   Homar was seen today for tremors and headache. Diagnoses and all orders for this visit:    Chronic migraine w/o aura w/o status migrainosus, not intractable  -     REFERRAL TO PHYSICAL THERAPY    Essential tremor    Balance disorder  -     REFERRAL TO PHYSICAL THERAPY    Chronic pain of both shoulders  -     REFERRAL TO PAIN MANAGEMENT    Other orders  -     venlafaxine-SR (EFFEXOR-XR) 150 mg capsule; Take 1 Cap by mouth daily. -     propranolol (INDERAL) 10 mg tablet; Take 1 Tab by mouth three (3) times daily. 76-year-old gentleman who has had a negative ISADORA scan for parkinsonism. This is very reassuring. I discussed the results with him personally. I am strongly suspicious that the fluctuating tremor is more essential type with stress-induced component particularly in the setting of his anxiety. We are going to increase Effexor to 150 mg daily. He is currently on 76. I am going to give him a course of very low-dose propranolol for the tremor. We briefly discussed DBS but he does not need that at this juncture until we have exhausted our medication options. I am going to send him to physical therapy given the persistent headaches and concussion history as well as the imbalance sensation he has. He may benefit from some vestibular rehab and balance rehab. He is having a lot of persistent chronic pain in the shoulders poorly responsive to diclofenac.   I have offered him a pain referral and he would like to pursue that. I think it is reasonable to try Botox one more attempt. Some patients do not have adequate response after first cycle of injections. We will receive his Botox and stored for him. Next appointment we will discuss the headache treatment. I discussed with him that I am not finding any life-threatening process or evidence of neurodegenerative disease to date. I would like him to follow-up in 3 months.           Carrie Garcia, [address]  Diplomate ABPN

## 2017-06-21 NOTE — TELEPHONE ENCOUNTER
Pt calling in re to his Botox. He would like to know if we received his Botox. He is scheduled f/u July 31st. Please give him a call back.

## 2017-07-31 ENCOUNTER — OFFICE VISIT (OUTPATIENT)
Dept: NEUROLOGY | Age: 47
End: 2017-07-31

## 2017-07-31 VITALS
SYSTOLIC BLOOD PRESSURE: 140 MMHG | DIASTOLIC BLOOD PRESSURE: 88 MMHG | HEART RATE: 90 BPM | RESPIRATION RATE: 18 BRPM | OXYGEN SATURATION: 97 %

## 2017-07-31 DIAGNOSIS — G43.709 CHRONIC MIGRAINE W/O AURA W/O STATUS MIGRAINOSUS, NOT INTRACTABLE: ICD-10-CM

## 2017-07-31 DIAGNOSIS — R26.89 BALANCE DISORDER: ICD-10-CM

## 2017-07-31 DIAGNOSIS — Z87.820 HISTORY OF MULTIPLE CONCUSSIONS: ICD-10-CM

## 2017-07-31 DIAGNOSIS — G25.2 TREMOR, COARSE: Primary | ICD-10-CM

## 2017-07-31 NOTE — PROGRESS NOTES
Chief Complaint   Patient presents with    Tremors       HPI    Bhanu Wooten is a 53-year-old man here to follow-up. I follow him for various conditions include chronic migraine, history of concussion, imbalance, tremor. Normal MRI brain. Negative ISADORA scan. He continues to have predominantly right arm resting tremor. Headaches persist.  He is due for Botox next week. Still having a lot of imbalance and vestibular  Complaints. He has not gone to concussion rehab yet. Effexor seems to be helping with his anxiety. Summer also has been beneficial for low stress. Review of Systems   Neurological: Positive for dizziness, tremors and headaches. Psychiatric/Behavioral: The patient is nervous/anxious. All other systems reviewed and are negative. Past Medical History:   Diagnosis Date    Allergic rhinitis 4/8/2012    Asthma 4/8/2012    Asthma     Cancer ([facility] [address]     skin ca exision from scalp w/ subsequent  local numbness     HX OTHER MEDICAL     rt shoulder posterior instability     Migraine 4/8/2012    Sciatica     Sinus headache 4/8/2012    Tension headache 4/8/2012    Trauma     nasal fracture, 3 concussions, soft ball eye injury     Trauma 06/2016    concussion . dislocated jaw . eval'd by ENT . Dr. Levada Cockayne and Dentist      History reviewed. No pertinent family history. Social History     Social History    Marital status:      Spouse name: N/A    Number of children: N/A    Years of education: N/A     Occupational History    Not on file.      Social History Main Topics    Smoking status: Never Smoker    Smokeless tobacco: Never Used    Alcohol use Not on file    Drug use: Not on file    Sexual activity: Not on file     Other Topics Concern    Not on file     Social History Narrative     Allergies   Allergen Reactions    Amitriptyline Other (comments)     Mental slowing     Gabapentin Other (comments)     Weight gain          Current Outpatient Prescriptions   Medication Sig    venlafaxine-SR (EFFEXOR-XR) 150 mg capsule Take 1 Cap by mouth daily.  propranolol (INDERAL) 10 mg tablet Take 1 Tab by mouth three (3) times daily.  amLODIPine-benazepril (LOTREL) 5-10 mg per capsule TAKE 1 CAPSULE DAILY (NEED APPOINTMENT)    IBUPROFEN (ADVIL PO) Take  by mouth.  aspirin-acetaminophen-caffeine (EXCEDRIN ES) 250-250-65 mg per tablet Take 1 Tab by mouth.  DIPHENHYDRAMINE HCL (BENADRYL PO) Take  by mouth.  magnesium oxide (MAG-OX) 400 mg tablet Take 1 Tab by mouth nightly.  CHLORZOXAZONE (LORZONE PO) Take  by mouth. 1/2 tab bid .  fluticasone (FLONASE) 50 mcg/actuation nasal spray 1 [place] by Both Nostrils route two (2) times a day.  diclofenac EC (VOLTAREN) 50 mg EC tablet Take  by mouth two (2) times a day.  montelukast (SINGULAIR) 10 mg tablet Take 10 mg by mouth daily.  Cetirizine (ZYRTEC) 10 mg Cap Take  by mouth.  albuterol (PROVENTIL HFA, VENTOLIN HFA) 90 mcg/actuation inhaler Take 2 Puffs by inhalation every four (4) hours as needed. No current facility-administered medications for this visit. Neurologic Exam     Mental Status        WD/WN adult in NAD, normal grooming  VSS  A&O x 3    PERRL, nonicteric  Face is symmetric, tongue midline  Speech is fluent and clear  No limb ataxia. No abnl movements. Moving all extemities spontaneously and symmetric    Right hand resting tremor moderate amplitude sometimes moderate to high frequency. Fluctuating. Abates and resolves at times. Normal gait    CVS RRR  Lungs nonlabored  Skin is warm and dry         Visit Vitals    /88    Pulse 90    Resp 18    SpO2 97%       Assessment and Plan   Diagnoses and all orders for this visit:    1. Tremor, coarse  -     REFERRAL TO NEUROLOGY    2. Chronic migraine w/o aura w/o status migrainosus, not intractable    3.  Balance disorder  -     REFERRAL TO PHYSICAL THERAPY  -     REFERRAL TO NEUROLOGY    4. History of multiple concussions  - REFERRAL TO PHYSICAL THERAPY      65-year-old gentleman with multiple chronic conditions:  Chronic migraine- he is failed multiple agents. We will repeat Botox injections next week. History of concussion- still having persistent vestibular symptoms. Reconsult for trial of physical therapy particularly focusing on concussion and vestibular system. Tremor- right hand tremor evident today resting. Fluctuating. Functional movement disorder? .  Negative workup to date. Will send to [facility] for an opinion. Will not change propranolol at this time due to side effects. May discontinue it after [facility] evaluation. I would like to see him next week for Botox injections and then again at 3 months for routine follow-up. I reviewed and decided to continue the current medications.       [address] [facility], [facility] [address]  Diplomate ABPN

## 2017-07-31 NOTE — MR AVS SNAPSHOT
Visit Information Date & Time Provider Department Dept. Phone Encounter #  
 7/31/2017  9:20  [facility] [address] [id number] Follow-up Instructions Return in about 3 months (around 10/31/2017), or and for botox 8/10 THU ay 115p. Routing History Upcoming Health Maintenance Date Due Pneumococcal 19-64 Medium Risk (1 of 1 - PPSV23) 11/19/1988 DTaP/Tdap/Td series (1 - Tdap) 5/21/2005 INFLUENZA AGE 9 TO ADULT 8/1/2017 Allergies as of 7/31/2017  Review Complete On: 7/31/2017 By: Harris Ennis LPN Severity Noted Reaction Type Reactions Amitriptyline  12/09/2016    Other (comments) Mental slowing Gabapentin  12/09/2016    Other (comments) Weight gain Current Immunizations  Reviewed on 11/4/2013 Name Date Td 5/20/2005 Not reviewed this visit You Were Diagnosed With   
  
 Codes Comments Tremor, coarse    -  Primary ICD-10-CM: G25.2 ICD-9-CM: 535. 0 Chronic migraine w/o aura w/o status migrainosus, not intractable     ICD-10-CM: Z90.401 ICD-9-CM: 346.70 Balance disorder     ICD-10-CM: R26.89 
ICD-9-CM: 781.99 History of multiple concussions     ICD-10-CM: Z87.820 ICD-9-CM: V15.52 Vitals BP Pulse Resp SpO2 Smoking Status 140/88 90 18 97% Never Smoker Vitals History Preferred Pharmacy Pharmacy Name Phone [address] Vel[address] [facility] [phone] Your Updated Medication List  
  
   
This list is accurate as of: 7/31/17  9:46 AM.  Always use your most recent med list. ADVIL PO Take  by mouth. albuterol 90 mcg/actuation inhaler Commonly known as:  PROVENTIL HFA, VENTOLIN HFA, PROAIR HFA Take 2 Puffs by inhalation every four (4) hours as needed. amLODIPine-benazepril 5-10 mg per capsule Commonly known as:  Mallorie Cat  
 TAKE 1 CAPSULE DAILY (NEED APPOINTMENT)  
  
 aspirin-acetaminophen-caffeine 250-250-65 mg per tablet Commonly known as:  EXCEDRIN ES Take 1 Tab by mouth. BENADRYL PO Take  by mouth. diclofenac EC 50 mg EC tablet Commonly known as:  VOLTAREN Take  by mouth two (2) times a day. fluticasone 50 mcg/actuation nasal spray Commonly known as:  FLONASE  
1 [place] by Both Nostrils route two (2) times a day. LORZONE PO Take  by mouth. 1/2 tab bid .  
  
 magnesium oxide 400 mg tablet Commonly known as:  MAG-OX Take 1 Tab by mouth nightly. propranolol 10 mg tablet Commonly known as:  INDERAL Take 1 Tab by mouth three (3) times daily. SINGULAIR 10 mg tablet Generic drug:  montelukast  
Take 10 mg by mouth daily. venlafaxine- mg capsule Commonly known as:  EFFEXOR-XR Take 1 Cap by mouth daily. ZyrTEC 10 mg Cap Generic drug:  Cetirizine Take  by mouth. We Performed the Following REFERRAL TO NEUROLOGY [XNP78 Custom] Comments: To [facility] [place] REFERRAL TO PHYSICAL THERAPY [SRQ41 Custom] Comments:  
 Concussion and vestibular rehab Follow-up Instructions Return in about 3 months (around 10/31/2017), or and for botox 8/10 THU ay 115p. Referral Information Referral ID Referred By Referred To  
  
 [id number] Wenceslao RAIN, PT, DPT   
   [address] [place] [address], [facility] Final Phone: [phone] Fax: [phone] Visits Status Start Date End Date 1 New Request 7/31/17 7/31/18 If your referral has a status of pending review or denied, additional information will be sent to support the outcome of this decision. Referral ID Referred By Referred To  
 [id number] Jennifer RAIN MD  
   [address] [place] [address],[facility], [address], [place] Phone: [phone] Fax: [phone] Visits Status Start Date End Date 1 New Request 7/31/17 7/31/18 If your referral has a status of pending review or denied, additional information will be sent to support the outcome of this decision. Patient Instructions PRESCRIPTION REFILL POLICY [facility] Statement to Patients April 1, 2014 In an effort to ensure the large volume of patient prescription refills is processed in the most efficient and expeditious manner, we are asking our patients to assist us by calling your Pharmacy for all prescription refills, this will include also your  Mail Order Pharmacy. The pharmacy will contact our office electronically to continue the refill process. Please do not wait until the last minute to call your pharmacy. We need at least 48 hours (2days) to fill prescriptions. We also encourage you to call your pharmacy before going to  your prescription to make sure it is ready. With regard to controlled substance prescription refill requests (narcotic refills) that need to be picked up at our office, we ask your cooperation by providing us with at least 72 hours (3days) notice that you will need a refill. We will not refill narcotic prescription refill requests after 4:00pm on any weekday, Monday through Thursday, or after 2:00pm on Fridays, or on the weekends. We encourage everyone to explore another way of getting your prescription refill request processed using [employer], our patient web portal through our electronic medical record system. [employer] is an efficient and effective way to communicate your medication request directly to the office and  downloadable as an mily on your smart phone . [employer] also features a review functionality that allows you to view your medication list as well as leave messages for your physician. Are you ready to get connected? If so please review the attatched instructions or speak to any of our staff to get you set up right away! Thank you so much for your cooperation. Should you have any questions please contact our Practice Administrator. The Physicians and Staff,  [facility] Thank you for choosing [facility] for your  
 
care. You may receive a survey about your visit. We appreciate you taking time  
 
to complete this survey as we use your feedback to improve our services. We  
 
realize we are not perfect, but we strive to provide excellent care. Introducing [facility]! [facility] introduces [employer] patient portal. Now you can access parts of your medical record, email your doctor's office, and request medication refills online. 1. In your internet browser, go to [email or web address] 2. Click on the First Time User? Click Here link in the Sign In box. You will see the New Member Sign Up page. 3. Enter your [employer] Access Code exactly as it appears below. You will not need to use this code after youve completed the sign-up process. If you do not sign up before the expiration date, you must request a new code. · [employer] Access Code: [email or web address] Expires: 10/29/2017  9:15 AM 
 
4. Enter the last four digits of your Social Security Number (xxxx) and Date of Birth (mm/dd/yyyy) as indicated and click Submit. You will be taken to the next sign-up page. 5. Create a [employer] ID. This will be your [employer] login ID and cannot be changed, so think of one that is secure and easy to remember. 6. Create a [employer] password. You can change your password at any time. 7. Enter your Password Reset Question and Answer. This can be used at a later time if you forget your password. 8. Enter your e-mail address. You will receive e-mail notification when new information is available in [address] 9. Click Sign Up. You can now view and download portions of your medical record.  
10. Click the Download Summary menu link to download a portable copy of your medical information. If you have questions, please visit the Frequently Asked Questions section of the [employer] website. Remember, [employer] is NOT to be used for urgent needs. For medical emergencies, dial 911. Now available from your iPhone and Android! Please provide this summary of care documentation to your next provider. Your primary care clinician is listed as [id number] Gomes IV. If you have any questions after today's visit, please call [phone].

## 2017-07-31 NOTE — PATIENT INSTRUCTIONS
[facility]   Statement to Patients  April 1, 2014      In an effort to ensure the large volume of patient prescription refills is processed in the most efficient and expeditious manner, we are asking our patients to assist us by calling your Pharmacy for all prescription refills, this will include also your  Mail Order Pharmacy. The pharmacy will contact our office electronically to continue the refill process. Please do not wait until the last minute to call your pharmacy. We need at least 48 hours (2days) to fill prescriptions. We also encourage you to call your pharmacy before going to  your prescription to make sure it is ready. With regard to controlled substance prescription refill requests (narcotic refills) that need to be picked up at our office, we ask your cooperation by providing us with at least 72 hours (3days) notice that you will need a refill. We will not refill narcotic prescription refill requests after 4:00pm on any weekday, Monday through Thursday, or after 2:00pm on Fridays, or on the weekends. We encourage everyone to explore another way of getting your prescription refill request processed using [employer], our patient web portal through our electronic medical record system. [employer] is an efficient and effective way to communicate your medication request directly to the office and  downloadable as an mily on your smart phone . [employer] also features a review functionality that allows you to view your medication list as well as leave messages for your physician. Are you ready to get connected? If so please review the attatched instructions or speak to any of our staff to get you set up right away! Thank you so much for your cooperation. Should you have any questions please contact our Practice Administrator.     The Physicians and Staff,  [facility]     Thank you for choosing [facility] for your     care. You may receive a survey about your visit. We appreciate you taking time     to complete this survey as we use your feedback to improve our services. We     realize we are not perfect, but we strive to provide excellent care.

## 2017-08-09 ENCOUNTER — HOSPITAL ENCOUNTER (OUTPATIENT)
Dept: PHYSICAL THERAPY | Age: 47
Discharge: HOME | End: 2017-08-09
Payer: COMMERCIAL

## 2017-08-09 PROCEDURE — 97161 PT EVAL LOW COMPLEX 20 MIN: CPT | Performed by: PHYSICAL THERAPIST

## 2017-08-09 NOTE — PROGRESS NOTES
PT INITIAL EVALUATION NOTE 2-15    Patient Name: Ruben Victoria  Date:2017  : 1970  [x]  Patient  Verified  Payor: Watson Eduardo / Plan: [address] [place] [address] / Product Type: PPO /    In time:210p  Out time:315p  Total Treatment Time (min): 60  Visit #: 1     Treatment Area: Post concussion syndrome [F07.81]    SUBJECTIVE  Pain Level (0-10 scale): 3/10  Any medication changes, allergies to medications, adverse drug reactions, diagnosis change, or new procedure performed?: [] No    [x] Yes (see summary sheet for update)  Subjective:     6-9 concussions though youth to adult (4th grade through college) and first 3 with LOC elementary, and high school. Last concussion was in 2016 resulting in balance deficit with EO and EC (worse with Closed and worse with bending over or looking up), feels like he has the physical effects of drinking alcohol worse in AM and PM, and resting tremor in right hand (pill rolling). Left hand resting tremor has worsened slightly. Light, sounds and smells may reproduce headaches. Increased magnitude of mood swings but has improved with the venlafaxine. He notices no deficits in reaction time or visual performance. He does not associate onset of any symptoms with physical activity (increased heart rate). He feels like he is going to fall over when he closes his eyes, he falls or almost falls once a week. He notices his tremors are worse the day after exercises. He feels that he has had some memory loss, increased cognitive strain with normal work load, difficulty recalling words sometimes. He has migraines since high school. He is awaiting treatment for for Botox for migraines. He is going to bed around 9pm and has to take a 30 minute rest at lunch.       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 and left LE sciatica nerve irritation    OBJECTIVE/EXAMINATION  Observations:  Posture: good up right posture  Gait: able to ambulate at normal speed  Functional Mobility: dizziness noted with sit to stand but resolved once standing  Palpation: will assess next session  Cervical AROM:  Flexion 50  Extension 40  Side Bend R 35  L 25  Rotation R 65  L 60  Strength:  UE: Grossly WNL  LE: Grossly WNL  Vision:   Spontaneous Nystagmus: negative   Gaze Hold Nystagmus: negative   Occulomotor control (H pattern): normal   Smooth Pursuit (H pattern): normal   Convergence: double vision at 14 cm   Saccades (shift eyes bw 2 targets): normal   Visual Acuity: normal    Gaze stabilization (hold eyes on stable target while moving head): normal at 2 Hz   Skew Eye Deviation: normal  Vestibular Function:   VOR: slow head movements: normal   VOR: fast head movements: normal   Head Thrust: normal  Cerebellar Function:    Finger to nose: normal   Pointing/ past pointing: normal   Rapid forearm supination/pronation: normal   VOR Cancellation: normal  Vertebral Artery Test: NT  BPPV:  Heavenly Hallpike: NT  Roll Test: NT  Head Hang: NT   Cervical Tests:   Alar Ligament Test: will assess next session  Balance Tests: FEROZ Test (number of mistakes listed below) 16        Non-compliant surface    [place]:  EC 0   Single Leg  EC   L 1   Sharpened [place]:  EC 0        Compliant Surface   [place]: EC 1   Single Leg   EC   L 6   Sharpened [place]:  EC 8    Mobility Assessment: will assess next session.                     Other Objective/Functional Measures: FOTO Functional Measure: 89/100    Pain Level (0-10 scale) post treatment: 3/10      ASSESSMENT:      [x]  See Plan of Care      Carlos Torres PT, DPT 8/9/2017  2:19 PM

## 2017-08-09 NOTE — PROGRESS NOTES
[facility]  [address]  [place], [address]  Phone: [phone]  Fax: [phone]    Plan of Care/Statement of Necessity for Physical Therapy Services  2-15    Patient name: Kasandra Bhatt  : 1970  Provider#: [id number]  Referral source: Vanessa Escalante, *      Medical/Treatment Diagnosis: Post concussion syndrome [F07.81]     Prior Hospitalization: see medical history     Comorbidities: migraines, history of multiple concussions, previous existing Parkinson-like symptoms, high stress load with occupation  Prior Level of Function: complete 20 minutes of exercise at least 3 times a week    Medications: Verified on Patient Summary List    Start of Care: 2017      Onset Date: 2016       The Plan of Care and following information is based on the information from the initial evaluation. Assessment/ alva information: 51 y.o. Male with post-concussion symptom lasting present for past year resulting in balance deficit worse with eyes closed. He is still quite active playing in adult softball league and states that his headache and dizziness reduces with physical activity. He has noticed worsening in resting tremors in right and left hands. Normal visual performance testing. He admits that he has a challenge of managing his overall stress levels related to work and that he typically feels overall worse as a result. Focus of treatment will be to improve balance performance and assist with management strategies for symptom/trigger exposure reduction for work.     Evaluation Complexity History HIGH Complexity :3+ comorbidities / personal factors will impact the outcome/ POC ; Examination HIGH Complexity : 4+ Standardized tests and measures addressing body structure, function, activity limitation and / or participation in recreation  ;Presentation LOW Complexity : Stable, uncomplicated  ;Clinical Decision Making LOW Complexity : FOTO score of   Overall Complexity Rating: LOW     Problem List: impaired gait/ balance, decrease ADL/ functional abilitiies and decrease activity tolerance   Treatment Plan may include any combination of the following: Therapeutic exercise, Therapeutic activities, Neuromuscular re-education, Physical agent/modality, Gait/balance training, Manual therapy, Patient education and Self Care training  Patient / Family readiness to learn indicated by: asking questions and trying to perform skills  Persons(s) to be included in education: patient (P)  Barriers to Learning/Limitations: None  Patient Goal (s): understand the issue, stabilization and continue to be active  Patient Self Reported Health Status: good  Rehabilitation Potential: good      Long Term Goals: To be accomplished in 6-12 treatments:  1) Pt will demonstrate improved balance control strategies resulting in no loss of balance with sit-stand activities  2) Pt will demonstrate improved balance control with low lighting environment with no loss of balance walking and turning   3) Pt will complete work day with less and 3/10 increase in pain utilizing schedule management strategies. Frequency / Duration: Patient to be seen 1-2 times per week for 6-12 treatments. Patient/ Caregiver education and instruction: activity modification    [x]  Plan of care has been reviewed with MARIA GUADALUPE Joaquin, PT, DPT 8/9/2017 4:06 PM    ________________________________________________________________________    I certify that the above Therapy Services are being furnished while the patient is under my care. I agree with the treatment plan and certify that this therapy is necessary.     [de-identified] Signature:____________________  Date:____________Time: _________

## 2017-08-10 ENCOUNTER — OFFICE VISIT (OUTPATIENT)
Dept: NEUROLOGY | Age: 47
End: 2017-08-10

## 2017-08-10 VITALS
OXYGEN SATURATION: 98 % | DIASTOLIC BLOOD PRESSURE: 80 MMHG | HEART RATE: 80 BPM | SYSTOLIC BLOOD PRESSURE: 140 MMHG | TEMPERATURE: 99.4 F | RESPIRATION RATE: 18 BRPM

## 2017-08-10 DIAGNOSIS — G43.709 CHRONIC MIGRAINE W/O AURA W/O STATUS MIGRAINOSUS, NOT INTRACTABLE: Primary | ICD-10-CM

## 2017-08-10 NOTE — PATIENT INSTRUCTIONS
[facility]   Statement to Patients  April 1, 2014      In an effort to ensure the large volume of patient prescription refills is processed in the most efficient and expeditious manner, we are asking our patients to assist us by calling your Pharmacy for all prescription refills, this will include also your  Mail Order Pharmacy. The pharmacy will contact our office electronically to continue the refill process. Please do not wait until the last minute to call your pharmacy. We need at least 48 hours (2days) to fill prescriptions. We also encourage you to call your pharmacy before going to  your prescription to make sure it is ready. With regard to controlled substance prescription refill requests (narcotic refills) that need to be picked up at our office, we ask your cooperation by providing us with at least 72 hours (3days) notice that you will need a refill. We will not refill narcotic prescription refill requests after 4:00pm on any weekday, Monday through Thursday, or after 2:00pm on Fridays, or on the weekends. We encourage everyone to explore another way of getting your prescription refill request processed using [employer], our patient web portal through our electronic medical record system. [employer] is an efficient and effective way to communicate your medication request directly to the office and  downloadable as an mily on your smart phone . [employer] also features a review functionality that allows you to view your medication list as well as leave messages for your physician. Are you ready to get connected? If so please review the attatched instructions or speak to any of our staff to get you set up right away! Thank you so much for your cooperation. Should you have any questions please contact our Practice Administrator.     The Physicians and Staff,  [address] [place] [address] [facility] Clinic     Thank you for choosing [address] [place] [address] and [address] [place] [address] [facility]rology Clinic for your     care. You may receive a survey about your visit. We appreciate you taking time     to complete this survey as we use your feedback to improve our services. We     realize we are not perfect, but we strive to provide excellent care.

## 2017-08-10 NOTE — PROGRESS NOTES
Botox Injection Note     8/10/2017     Patient:  Liz Beck   YOB: 1970  Date of Visit: 8/10/2017      Indication: patient has chronic migraine headaches greater than 15 days per month lasting more than 4 hours each. She has failed or not tolerated 2 or more medication preventatives. Written consent was signed and verified by me. Risks and benefits were discussed to include possible cosmetic asymmetry which is not permament or life threatening. Patient name and  was confirmed prior to procedure. Time out performed. Procedure:   Botox concentration: 200 units in 2 ml of preservative-free normal saline. 1:1 dilution. LOT#: [id number]  EXP:  2020    Injections and distribution as follow :      Units/site  Sites Loc Subtotal    procerus 5 1 ML 5   corrugaters 2.5 2 BL 5   frontalis 5 8 BL 40   Temporalis 10 4 BL 40   Occipitalis 10 2 BL 20   Cervical paraspinals 5 4 BL 20   Trapezius 10 4 BL 40         200 units Botox were reconstituted, 170 units injected as above and the remainder was unavoidably wasted. Patient tolerated procedure well. Return in 3 months for repeat injections.     _____________________________   Wilfrido Logan DO  [address], ABPN

## 2017-08-10 NOTE — MR AVS SNAPSHOT
Visit Information Date & Time Provider Department Dept. Phone Encounter #  
 8/10/2017  1:15  [facility], [address] [facility] at [address] [id number] Follow-up Instructions Return in 3 months (on 11/10/2017). Upcoming Health Maintenance Date Due Pneumococcal 19-64 Medium Risk (1 of 1 - PPSV23) 11/19/1988 DTaP/Tdap/Td series (1 - Tdap) 5/21/2005 INFLUENZA AGE 9 TO ADULT 8/1/2017 Allergies as of 8/10/2017  Review Complete On: 8/10/2017 By: [address], DO Severity Noted Reaction Type Reactions Amitriptyline  12/09/2016    Other (comments) Mental slowing Gabapentin  12/09/2016    Other (comments) Weight gain Current Immunizations  Reviewed on 11/4/2013 Name Date Td 5/20/2005 Not reviewed this visit You Were Diagnosed With   
  
 Codes Comments Chronic migraine w/o aura w/o status migrainosus, not intractable    -  Primary ICD-10-CM: F11.438 ICD-9-CM: 346.70 Vitals BP Pulse Temp Resp SpO2 Smoking Status 140/80 80 99.4 °F (37.4 °C) 18 98% Never Smoker Preferred Pharmacy Pharmacy Name Phone [address] Marissa Velasquez [facility] [phone] Your Updated Medication List  
  
   
This list is accurate as of: 8/10/17  1:34 PM.  Always use your most recent med list. ADVIL PO Take  by mouth. albuterol 90 mcg/actuation inhaler Commonly known as:  PROVENTIL HFA, VENTOLIN HFA, PROAIR HFA Take 2 Puffs by inhalation every four (4) hours as needed. amLODIPine-benazepril 5-10 mg per capsule Commonly known as:  LOTREL  
TAKE 1 CAPSULE DAILY (NEED APPOINTMENT)  
  
 aspirin-acetaminophen-caffeine 250-250-65 mg per tablet Commonly known as:  EXCEDRIN ES Take 1 Tab by mouth. BENADRYL PO Take  by mouth. diclofenac EC 50 mg EC tablet Commonly known as:  VOLTAREN  
 Take  by mouth two (2) times a day. fluticasone 50 mcg/actuation nasal spray Commonly known as:  FLONASE  
1 [place] by Both Nostrils route two (2) times a day. LORZONE PO Take  by mouth. 1/2 tab bid .  
  
 magnesium oxide 400 mg tablet Commonly known as:  MAG-OX Take 1 Tab by mouth nightly. propranolol 10 mg tablet Commonly known as:  INDERAL Take 1 Tab by mouth three (3) times daily. SINGULAIR 10 mg tablet Generic drug:  montelukast  
Take 10 mg by mouth daily. venlafaxine- mg capsule Commonly known as:  EFFEXOR-XR Take 1 Cap by mouth daily. ZyrTEC 10 mg Cap Generic drug:  Cetirizine Take  by mouth. We Performed the Following [address] [id number] CPT(R)] Follow-up Instructions Return in 3 months (on 11/10/2017). Patient Instructions PRESCRIPTION REFILL POLICY [facility] Statement to Patients April 1, 2014 In an effort to ensure the large volume of patient prescription refills is processed in the most efficient and expeditious manner, we are asking our patients to assist us by calling your Pharmacy for all prescription refills, this will include also your  Mail Order Pharmacy. The pharmacy will contact our office electronically to continue the refill process. Please do not wait until the last minute to call your pharmacy. We need at least 48 hours (2days) to fill prescriptions. We also encourage you to call your pharmacy before going to  your prescription to make sure it is ready. With regard to controlled substance prescription refill requests (narcotic refills) that need to be picked up at our office, we ask your cooperation by providing us with at least 72 hours (3days) notice that you will need a refill.  
 
We will not refill narcotic prescription refill requests after 4:00pm on any weekday, Monday through Thursday, or after 2:00pm on Fridays, or on the weekends. We encourage everyone to explore another way of getting your prescription refill request processed using [employer], our patient web portal through our electronic medical record system. [employer] is an efficient and effective way to communicate your medication request directly to the office and  downloadable as an mily on your smart phone . [employer] also features a review functionality that allows you to view your medication list as well as leave messages for your physician. Are you ready to get connected? If so please review the attatched instructions or speak to any of our staff to get you set up right away! Thank you so much for your cooperation. Should you have any questions please contact our Practice Administrator. The Physicians and Staff,  [facility] Thank you for choosing [facility] and [facility] for your  
 
care. You may receive a survey about your visit. We appreciate you taking time  
 
to complete this survey as we use your feedback to improve our services. We  
 
realize we are not perfect, but we strive to provide excellent care. Introducing [address]! Ihsan Case introduces [employer] patient portal. Now you can access parts of your medical record, email your doctor's office, and request medication refills online. 1. In your internet browser, go to [email or web address] 2. Click on the First Time User? Click Here link in the Sign In box. You will see the New Member Sign Up page. 3. Enter your [employer] Access Code exactly as it appears below. You will not need to use this code after youve completed the sign-up process. If you do not sign up before the expiration date, you must request a new code. · [employer] Access Code: [email or web address] Expires: 10/29/2017  9:15 AM 
 
 4. Enter the last four digits of your Social Security Number (xxxx) and Date of Birth (mm/dd/yyyy) as indicated and click Submit. You will be taken to the next sign-up page. 5. Create a [employer] ID. This will be your [employer] login ID and cannot be changed, so think of one that is secure and easy to remember. 6. Create a [employer] password. You can change your password at any time. 7. Enter your Password Reset Question and Answer. This can be used at a later time if you forget your password. 8. Enter your e-mail address. You will receive e-mail notification when new information is available in [address] 9. Click Sign Up. You can now view and download portions of your medical record. 10. Click the Download Summary menu link to download a portable copy of your medical information. If you have questions, please visit the Frequently Asked Questions section of the [employer] website. Remember, [employer] is NOT to be used for urgent needs. For medical emergencies, dial 911. Now available from your iPhone and Android! Please provide this summary of care documentation to your next provider. Your primary care clinician is listed as [phone] IV. If you have any questions after today's visit, please call [phone].

## 2017-08-30 ENCOUNTER — HOSPITAL ENCOUNTER (OUTPATIENT)
Dept: PHYSICAL THERAPY | Age: 47
Discharge: HOME | End: 2017-08-30
Payer: COMMERCIAL

## 2017-08-30 PROCEDURE — 97112 NEUROMUSCULAR REEDUCATION: CPT | Performed by: PHYSICAL THERAPIST

## 2017-08-30 NOTE — PROGRESS NOTES
PT DAILY TREATMENT NOTE - [facility] 2-15    Patient Name: Cata Clemens  Date:2017  : 1970  [x]  Patient  Verified  Payor: Elaine Saucedo / Plan: [address] [place] [address] / Product Type: PPO /    In time:300p  Out time:405p  Total Treatment Time (min): 60  Total Timed Codes (min): 60  1:1 Treatment Time ( only): --   Visit #: 2     Treatment Area: Post concussion syndrome [F07.81]    SUBJECTIVE  Pain Level (0-10 scale): 4/10  Any medication changes, allergies to medications, adverse drug reactions, diagnosis change, or new procedure performed?: [x] No    [] Yes (see summary sheet for update)  Subjective functional status/changes:   [] No changes reported  Pt reports that he has done extensive testing for ruling out parkinson/brain deterioration origin resulting conclusion of increased resting tremor in the right hand are more related to concussion and psychological impact. Pain in right shoulder has gotten worse. Yoga on Monday was difficult and he was unable to do warrior 1 or the eagle poses without loss of balance. Headaches have increased some and he is exhausted since he has begun school last week. He is unwilling to alter his work load and recration exposure to minimize symptom reproduction.     OBJECTIVE             60 min Neuromuscular Re-education:  [x]  See flow sheet : review of current status   Rationale: increase ROM, increase strength, improve coordination, improve balance and increase proprioception  to improve the patients ability to maintain static and dynamic balance control      With   [x] TE   [] TA   [] neuro   [] other: Patient Education: [x] Review HEP    [] Progressed/Changed HEP based on:   [] positioning   [] body mechanics   [] transfers   [] heat/ice application    [] other:      Other Objective/Functional Measures: --     Pain Level (0-10 scale) post treatment: 0/10    ASSESSMENT/Changes in Function:   Increased difficulty with soft surface balance performance during gaze stabilization. Able to maintain balance on rocker board with light hand contact with gaze stabilization therefore advanced to VOR cancellation during tapping of rocker board. He did report dizziness after 4th set of BOSU lunge/balance. Patient will continue to benefit from skilled PT services to modify and progress therapeutic interventions, assess and modify postural abnormalities, address imbalance/dizziness and instruct in home and community integration to attain remaining goals. []  See Plan of Care  []  See progress note/recertification  []  See Discharge Summary         Progress towards goals / Updated goals:  Long Term Goals:  To be accomplished in 6-12 treatments:  1) Pt will demonstrate improved balance control strategies resulting in no loss of balance with sit-stand activities  2) Pt will demonstrate improved balance control with low lighting environment with no loss of balance walking and turning   3) Pt will complete work day with less and 3/10 increase in pain utilizing schedule management strategies.       PLAN  []  Upgrade activities as tolerated     [x]  Continue plan of care  []  Update interventions per flow sheet       []  Discharge due to:_  []  Other:_      Shai Sams, PT, DPT 8/30/2017  3:02 PM

## 2017-09-05 ENCOUNTER — DOCUMENTATION ONLY (OUTPATIENT)
Dept: NEUROLOGY | Age: 47
End: 2017-09-05

## 2017-09-08 ENCOUNTER — HOSPITAL ENCOUNTER (OUTPATIENT)
Dept: PHYSICAL THERAPY | Age: 47
Discharge: HOME | End: 2017-09-08
Payer: COMMERCIAL

## 2017-09-08 PROCEDURE — 97112 NEUROMUSCULAR REEDUCATION: CPT | Performed by: PHYSICAL THERAPIST

## 2017-09-08 NOTE — PROGRESS NOTES
PT DAILY TREATMENT NOTE - [facility] 2-15    Patient Name: Lori Prabhakar  Date:2017  : 1970  [x]  Patient  Verified  Payor: BLUE CROSS / Plan: [address] [place] [address] / Product Type: PPO /    In time:1110a  Out time:1205p  Total Treatment Time (min): 55  Total Timed Codes (min): 55  1:1 Treatment Time ( only): --   Visit #: 3     Treatment Area: Postconcussional syndrome [F07.81]    SUBJECTIVE  Pain Level (0-10 scale): 3/10 dizzy rating  Any medication changes, allergies to medications, adverse drug reactions, diagnosis change, or new procedure performed?: [x] No    [] Yes (see summary sheet for update)  Subjective functional status/changes:   [] No changes reported  Pt reports that he is noticing progressive increase of dizziness as he continues with his exercises at home. 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 He did notice some balance difficulty as he progressed with the catching. OBJECTIVE  55 min Neuromuscular Re-education:  [x]  See flow sheet : review of current status   Rationale: increase ROM, increase strength, improve coordination, improve balance and increase proprioception  to improve the patients ability to maintain static and dynamic balance control        With   [x] TE   [] TA   [] neuro   [] other: Patient Education: [x] Review HEP    [] Progressed/Changed HEP based on:   [] positioning   [] body mechanics   [] transfers   [] heat/ice application    [] other:       Other Objective/Functional Measures: --                  Pain Level (0-10 scale) post treatment: 0/10     ASSESSMENT/Changes in Function:   Increased difficulty with soft surface balance performance during gaze stabilization. Continues to show effect of progressive summation of expose and increased symptom of dizziness. Will attempt to advance with body movement activity with and without head movement next session.   Patient will continue to benefit from skilled PT services to modify and progress therapeutic interventions, assess and modify postural abnormalities, address imbalance/dizziness and instruct in home and community integration to attain remaining goals.      []  See Plan of Care  []  See progress note/recertification  []  See Discharge Summary      Progress towards goals / Updated goals:  Long Term Goals: To be accomplished in 6-12 treatments:  1) Pt will demonstrate improved balance control strategies resulting in no loss of balance with sit-stand activities  2) Pt will demonstrate improved balance control with low lighting environment with no loss of balance walking and turning   3) Pt will complete work day with less and 3/10 increase in pain utilizing schedule management strategies.        PLAN  []  Upgrade activities as tolerated     [x]  Continue plan of care  []  Update interventions per flow sheet       []  Discharge due to:_  []  Other:_      Ryan Encarnacion PT, DPT 9/8/2017  11:14 AM

## 2017-09-18 ENCOUNTER — HOSPITAL ENCOUNTER (OUTPATIENT)
Dept: PHYSICAL THERAPY | Age: 47
Discharge: HOME | End: 2017-09-18
Payer: COMMERCIAL

## 2017-09-18 PROCEDURE — 97112 NEUROMUSCULAR REEDUCATION: CPT | Performed by: PHYSICAL THERAPIST

## 2017-09-18 NOTE — PROGRESS NOTES
PT DAILY TREATMENT NOTE - [facility] 2-15    Patient Name: Jeffery Brothers  Date:2017  : 1970  [x]  Patient  Verified  Payor: BLUE CROSS / Plan: [address] [place] [address] / Product Type: PPO /    In time:345p  Out time:410p  Total Treatment Time (min): 25  Total Timed Codes (min): 25  1:1 Treatment Time ( only): --   Visit #: 4     Treatment Area: Postconcussional syndrome [F07.81]    SUBJECTIVE  Pain Level (0-10 scale): /10  Any medication changes, allergies to medications, adverse drug reactions, diagnosis change, or new procedure performed?: [x] No    [] Yes (see summary sheet for update)  Subjective functional status/changes:   [] No changes reported  Pt reports that he was in conference all last week in [place] and had increased headaches and dizziness each day progressively worse. He was fatiguing faster with HEP as the week went on. OBJECTIVE          25 min Neuromuscular Re-education:  [x]  See flow sheet : review of current status and progression of visual training incorporating walking with visual tasks   Rationale: increase ROM, increase strength, improve coordination, improve balance and increase proprioception  to improve the patients ability to resume normal visual performance              With   [] TE   [] TA   [] neuro   [] other: Patient Education: [x] Review HEP    [] Progressed/Changed HEP based on:   [] positioning   [] body mechanics   [] transfers   [] heat/ice application    [] other:      Other Objective/Functional Measures: dizziness with walking gaze stabilization     Pain Level (0-10 scale) post treatment: 4/10    ASSESSMENT/Changes in Function:   Unable to much today in the clinic due to overall level of irritability and symptom presentation of dizziness present. Did instruct for advancement of HEP when symptoms hopefully reduce. Will follow up in 1-2 weeks and consider plan moving forward.   Patient will continue to benefit from skilled PT services to modify and progress therapeutic interventions, address functional mobility deficits, address ROM deficits, address strength deficits, analyze and address soft tissue restrictions, analyze and cue movement patterns, analyze and modify body mechanics/ergonomics and assess and modify postural abnormalities to attain remaining goals. []  See Plan of Care  []  See progress note/recertification  []  See Discharge Summary         Progress towards goals / Updated goals:  Long Term Goals: To be accomplished in 6-12 treatments:  1) Pt will demonstrate improved balance control strategies resulting in no loss of balance with sit-stand activities  2) Pt will demonstrate improved balance control with low lighting environment with no loss of balance walking and turning   3) Pt will complete work day with less and 3/10 increase in pain utilizing schedule management strategies.     PLAN  []  Upgrade activities as tolerated     [x]  Continue plan of care  []  Update interventions per flow sheet       []  Discharge due to:_  []  Other:_      Vanessa Hoyt, PT, DPT 9/18/2017  3:45 PM

## 2017-10-10 ENCOUNTER — OFFICE VISIT (OUTPATIENT)
Dept: NEUROLOGY | Age: 47
End: 2017-10-10

## 2017-10-10 VITALS
WEIGHT: 228.6 LBS | RESPIRATION RATE: 16 BRPM | DIASTOLIC BLOOD PRESSURE: 80 MMHG | HEIGHT: 71 IN | OXYGEN SATURATION: 95 % | SYSTOLIC BLOOD PRESSURE: 140 MMHG | HEART RATE: 78 BPM | BODY MASS INDEX: 32 KG/M2

## 2017-10-10 DIAGNOSIS — Z87.820 HISTORY OF MULTIPLE CONCUSSIONS: ICD-10-CM

## 2017-10-10 DIAGNOSIS — G43.709 CHRONIC MIGRAINE W/O AURA W/O STATUS MIGRAINOSUS, NOT INTRACTABLE: Primary | ICD-10-CM

## 2017-10-10 DIAGNOSIS — G25.2 TREMOR, COARSE: ICD-10-CM

## 2017-10-10 RX ORDER — VENLAFAXINE HYDROCHLORIDE 150 MG/1
CAPSULE, EXTENDED RELEASE ORAL
COMMUNITY
Start: 2017-09-24 | End: 2018-07-11 | Stop reason: SDUPTHER

## 2017-10-10 RX ORDER — MONTELUKAST SODIUM 10 MG/1
10 TABLET ORAL
COMMUNITY
End: 2018-07-23

## 2017-10-10 NOTE — MR AVS SNAPSHOT
Visit Information Date & Time Provider Department Dept. Phone Encounter #  
 10/10/2017  3:40  [facility], [address] [facility] at [address] [id number] Follow-up Instructions Return in about 6 months (around 4/10/2018), or NOV for botox if needed. Routing History Upcoming Health Maintenance Date Due Pneumococcal 19-64 Medium Risk (1 of 1 - PPSV23) 11/19/1988 DTaP/Tdap/Td series (1 - Tdap) 5/21/2005 INFLUENZA AGE 9 TO ADULT 8/1/2017 Allergies as of 10/10/2017  Review Complete On: 10/10/2017 By: Jean Claude Villarreal LPN Severity Noted Reaction Type Reactions Amitriptyline  12/09/2016    Other (comments) Mental slowing Gabapentin  12/09/2016    Other (comments) Weight gain Current Immunizations  Reviewed on 11/4/2013 Name Date Td 5/20/2005 Not reviewed this visit You Were Diagnosed With   
  
 Codes Comments Chronic migraine w/o aura w/o status migrainosus, not intractable    -  Primary ICD-10-CM: L25.341 ICD-9-CM: 346.70 Tremor, coarse     ICD-10-CM: G25.2 ICD-9-CM: 781.0 History of multiple concussions     ICD-10-CM: Z87.820 ICD-9-CM: V15.52 Vitals BP Pulse Resp Height(growth percentile) Weight(growth percentile) SpO2  
 140/80 78 16 5' 11\" (1.803 m) 228 lb 9.6 oz (103.7 kg) 95% BMI Smoking Status 31.88 kg/m2 Never Smoker Vitals History BMI and BSA Data Body Mass Index Body Surface Area  
 31.88 kg/m 2 2.28 m 2 Preferred Pharmacy Pharmacy Name Phone 100 Marissa Velasquez, [facility] [phone] Your Updated Medication List  
  
   
This list is accurate as of: 10/10/17  4:02 PM.  Always use your most recent med list. ADVIL PO Take  by mouth. albuterol 90 mcg/actuation inhaler Commonly known as:  PROVENTIL HFA, VENTOLIN HFA, PROAIR HFA  
 Take 2 Puffs by inhalation every four (4) hours as needed. amLODIPine-benazepril 5-10 mg per capsule Commonly known as:  LOTREL  
TAKE 1 CAPSULE DAILY (NEED APPOINTMENT)  
  
 aspirin-acetaminophen-caffeine 250-250-65 mg per tablet Commonly known as:  EXCEDRIN ES Take 1 Tab by mouth. BENADRYL PO Take  by mouth. diclofenac EC 50 mg EC tablet Commonly known as:  VOLTAREN Take  by mouth two (2) times a day. fluticasone 50 mcg/actuation nasal spray Commonly known as:  FLONASE  
1 [place] by Both Nostrils route two (2) times a day. LORZONE PO Take  by mouth. 1/2 tab bid .  
  
 magnesium oxide 400 mg tablet Commonly known as:  MAG-OX Take 1 Tab by mouth nightly. propranolol 10 mg tablet Commonly known as:  INDERAL Take 1 Tab by mouth three (3) times daily. * montelukast 10 mg tablet Commonly known as:  SINGULAIR Take 10 mg by mouth. * SINGULAIR 10 mg tablet Generic drug:  montelukast  
Take 10 mg by mouth daily. * venlafaxine- mg capsule Commonly known as:  EFFEXOR-XR Take 1 Cap by mouth daily. * venlafaxine- mg capsule Commonly known as:  EFFEXOR-XR ZyrTEC 10 mg Cap Generic drug:  Cetirizine Take  by mouth. * Notice: This list has 4 medication(s) that are the same as other medications prescribed for you. Read the directions carefully, and ask your doctor or other care provider to review them with you. Follow-up Instructions Return in about 6 months (around 4/10/2018), or NOV for botox if needed. Introducing [facility] HE[facility]! Cam Clancy introduces [employer] patient portal. Now you can access parts of your medical record, email your doctor's office, and request medication refills online. 1. In your internet browser, go to [email or web address] 2. Click on the First Time User? Click Here link in the Sign In box. You will see the New Member Sign Up page. 3. Enter your [employer] Access Code exactly as it appears below. You will not need to use this code after youve completed the sign-up process. If you do not sign up before the expiration date, you must request a new code. · [employer] Access Code: [email or web address] Expires: 10/29/2017  9:15 AM 
 
4. Enter the last four digits of your Social Security Number (xxxx) and Date of Birth (mm/dd/yyyy) as indicated and click Submit. You will be taken to the next sign-up page. 5. Create a [employer] ID. This will be your [employer] login ID and cannot be changed, so think of one that is secure and easy to remember. 6. Create a [employer] password. You can change your password at any time. 7. Enter your Password Reset Question and Answer. This can be used at a later time if you forget your password. 8. Enter your e-mail address. You will receive e-mail notification when new information is available in [address] 9. Click Sign Up. You can now view and download portions of your medical record. 10. Click the Download Summary menu link to download a portable copy of your medical information. If you have questions, please visit the Frequently Asked Questions section of the [employer] website. Remember, [employer] is NOT to be used for urgent needs. For medical emergencies, dial 911. Now available from your iPhone and Android! Please provide this summary of care documentation to your next provider. Your primary care clinician is listed as [phone] [address]afne IV. If you have any questions after today's visit, please call [phone].

## 2017-10-10 NOTE — PROGRESS NOTES
Chief Complaint   Patient presents with    Headache     f/u       HPI    Mr. Ephraim Choi is a 44-year-old gentleman here to follow-up. Please see previous results for details of past visits. In brief, I see him for chronic migraine and tremor and history of multiple concussions. Since his last visit in September he has been seen by [facility] movement disorder clinic, Dr. Rosalio Schilling, and given a diagnosis of functional tremor. She cannot find any pathological conditions that were concerning. This is all very reassuring. The patient still has a daily headache described as a band. Unclear if Botox was helpful. He will see in November once it wears off it was providing any relief. He has stopped propranolol because it was not changing the tremor. He does notice now at times he has some word finding difficulty and pauses while he is thinking of the word to say. He is also forgetting some small details. No impairment of work performance. 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 He continues to be very physically active playing ball. He has learned to go to sleep earlier now which is body is requiring. Less alcohol being consumed. Review of Systems   Musculoskeletal: Positive for joint pain. Neurological: Positive for tremors and headaches. Psychiatric/Behavioral: Positive for memory loss. The patient is nervous/anxious. All other systems reviewed and are negative. Past Medical History:   Diagnosis Date    Allergic rhinitis 4/8/2012    Asthma 4/8/2012    Asthma     Cancer ([facility] [address]     skin ca exision from scalp w/ subsequent  local numbness     HX OTHER MEDICAL     rt shoulder posterior instability     Migraine 4/8/2012    Sciatica     Sinus headache 4/8/2012    Tension headache 4/8/2012    Trauma     nasal fracture, 3 concussions, soft ball eye injury     Trauma 06/2016    concussion . dislocated jaw . eval'd by ENT .  Dr. Safia Castañeda and Dentist      No family history on file. Social History     Social History    Marital status:      Spouse name: N/A    Number of children: N/A    Years of education: N/A     Occupational History    Not on file. Social History Main Topics    Smoking status: Never Smoker    Smokeless tobacco: Never Used    Alcohol use Not on file    Drug use: Not on file    Sexual activity: Not on file     Other Topics Concern    Not on file     Social History Narrative     Allergies   Allergen Reactions    Amitriptyline Other (comments)     Mental slowing     Gabapentin Other (comments)     Weight gain          Current Outpatient Prescriptions   Medication Sig    montelukast (SINGULAIR) 10 mg tablet Take 10 mg by mouth.  venlafaxine-SR (EFFEXOR-XR) 150 mg capsule     venlafaxine-SR (EFFEXOR-XR) 150 mg capsule Take 1 Cap by mouth daily.  amLODIPine-benazepril (LOTREL) 5-10 mg per capsule TAKE 1 CAPSULE DAILY (NEED APPOINTMENT)    IBUPROFEN (ADVIL PO) Take  by mouth.  DIPHENHYDRAMINE HCL (BENADRYL PO) Take  by mouth.  magnesium oxide (MAG-OX) 400 mg tablet Take 1 Tab by mouth nightly.  CHLORZOXAZONE (LORZONE PO) Take  by mouth. 1/2 tab bid .  fluticasone (FLONASE) 50 mcg/actuation nasal spray 1 [place] by Both Nostrils route two (2) times a day.  diclofenac EC (VOLTAREN) 50 mg EC tablet Take  by mouth two (2) times a day.  montelukast (SINGULAIR) 10 mg tablet Take 10 mg by mouth daily.  Cetirizine (ZYRTEC) 10 mg Cap Take  by mouth.  propranolol (INDERAL) 10 mg tablet Take 1 Tab by mouth three (3) times daily.  aspirin-acetaminophen-caffeine (EXCEDRIN ES) 250-250-65 mg per tablet Take 1 Tab by mouth.  albuterol (PROVENTIL HFA, VENTOLIN HFA) 90 mcg/actuation inhaler Take 2 Puffs by inhalation every four (4) hours as needed. No current facility-administered medications for this visit.             Neurologic Exam     Mental Status           WD/WN adult in NAD, normal grooming  VSS  A&O x 3    PERRL, nonicteric  Face is symmetric, tongue midline  Speech is fluent and clear  No limb ataxia. No abnl movements. Moving all extemities spontaneously and symmetric    Right hand resting tremor moderate amplitude sometimes moderate to high frequency. Fluctuating. Abates and resolves at times. Normal gait    CVS RRR  Lungs nonlabored  Skin is warm and dry       Visit Vitals    /80    Pulse 78    Resp 16    Ht 5' 11\" (1.803 m)    Wt 103.7 kg (228 lb 9.6 oz)    SpO2 95%    BMI 31.88 kg/m2       Assessment and Plan   Diagnoses and all orders for this visit:    1. Chronic migraine w/o aura w/o status migrainosus, not intractable    2. Tremor, coarse    3. History of multiple concussions    44-year-old gentleman with a history of multiple concussions who has chronic migraine headaches that sound very tension type in origin now. The description of a very bandlike presentation is tension-like. He does have concomitant migraines as well. Unclear if Botox is helpful we will wait to see how he does in November and resume injections if he felt they were beneficial.  He is having some subjective memory changes here and there. Would watch that for now. May consider formal neuropsych testing in the future. Tremor is functional which is reassuring. I have not find any pathological signs or symptoms nor did Dr. Bebo [place] at [facility]. As far as his concussions, he may have a new \"baseline\" as far as increased sensitivity for headaches and dizziness. He needs to listen his body as far as limitations ago. For now I will see him in 6 months for reassessment. We may revisit again in late 2017 for Botox injections if needed. More than 50% of this visit was spent discussing his extensive neurological workup, symptoms, prognosis, treatment plan. Greater than 50% of this 20 minute visit was spent counseling about the above.       [facility], [address]  Diplomate KAVYAN

## 2017-11-07 ENCOUNTER — HOSPITAL ENCOUNTER (OUTPATIENT)
Dept: MRI IMAGING | Age: 47
Discharge: HOME | End: 2017-11-07
Attending: ORTHOPAEDIC SURGERY
Payer: COMMERCIAL

## 2017-11-07 DIAGNOSIS — M75.41 IMPINGEMENT SYNDROME OF RIGHT SHOULDER: ICD-10-CM

## 2017-11-07 PROCEDURE — 73221 MRI JOINT UPR EXTREM W/O DYE: CPT

## 2017-11-21 NOTE — ANCILLARY DISCHARGE INSTRUCTIONS
[facility]  [id number] [address], [address] [place] [address]  Phone: [phone]  Fax: [phone]    Discharge Summary  2-15    Patient name: Ijeoma Torres  : 1970  Provider#: [id number]  Referral source: Soren Herzog, *      Medical/Treatment Diagnosis: Postconcussional syndrome [F07.81]     Prior Hospitalization: see medical history     Comorbidities: migraines, history of multiple concussions, previous existing Parkinson-like symptoms, high stress load with occupation  Prior Level of Function:complete 20 minutes of exercise at least 3 times a week  Medications: Verified on Patient Summary List    Start of Care: 2017      Onset Date:2016   Visits from Start of Care: 4     Missed Visits: 0  Reporting Period : 17 to 17    Long Term Goals: To be accomplished in 6-12 treatments:  1) Pt will demonstrate improved balance control strategies resulting in no loss of balance with sit-stand activities  NOT MET  2) Pt will demonstrate improved balance control with low lighting environment with no loss of balance walking and turning  NOT MET  3) Pt will complete work day with less and 3/10 increase in pain utilizing schedule management strategies. NOT MET      ASSESSMENT/SUMMARY OF CARE:  Pt was seen for 4 visits for post concussion syndrome. Treatment included pt education, HEP, neuromuscular reeducation. Pt advised to FU in 1-2 weeks from 17 with management of symptoms. Pt failed to return to PT. D/c at this time.      RECOMMENDATIONS:  [x]Discontinue therapy: []Patient has reached or is progressing toward set goals      [x]Patient is non-compliant or has abdicated      []Due to lack of appreciable progress towards set goals    Anna Miller, PT, DPT 2017 12:46 PM

## 2017-11-22 ENCOUNTER — TELEPHONE (OUTPATIENT)
Dept: NEUROLOGY | Age: 47
End: 2017-11-22

## 2017-11-22 NOTE — TELEPHONE ENCOUNTER
Pt stopped by office to let the nurse know they needed to contact [facility] for Botox delivery.  Please call pharmacy and pt

## 2017-12-01 RX ORDER — VENLAFAXINE HYDROCHLORIDE 150 MG/1
CAPSULE, EXTENDED RELEASE ORAL
Qty: 90 CAP | Refills: 1 | Status: SHIPPED | OUTPATIENT
Start: 2017-12-01 | End: 2018-05-30 | Stop reason: SDUPTHER

## 2017-12-05 ENCOUNTER — OFFICE VISIT (OUTPATIENT)
Dept: NEUROLOGY | Age: 47
End: 2017-12-05

## 2017-12-05 VITALS
WEIGHT: 231 LBS | HEART RATE: 95 BPM | BODY MASS INDEX: 32.34 KG/M2 | HEIGHT: 71 IN | RESPIRATION RATE: 18 BRPM | DIASTOLIC BLOOD PRESSURE: 84 MMHG | SYSTOLIC BLOOD PRESSURE: 140 MMHG | OXYGEN SATURATION: 99 %

## 2017-12-05 DIAGNOSIS — G43.709 CHRONIC MIGRAINE W/O AURA W/O STATUS MIGRAINOSUS, NOT INTRACTABLE: Primary | ICD-10-CM

## 2017-12-05 RX ORDER — DICLOFENAC SODIUM 75 MG/1
TABLET, DELAYED RELEASE ORAL
Refills: 0 | COMMUNITY
Start: 2017-11-27 | End: 2020-11-17

## 2017-12-05 RX ORDER — CHOLECALCIFEROL (VITAMIN D3) 125 MCG
5 CAPSULE ORAL
COMMUNITY

## 2017-12-05 NOTE — PATIENT INSTRUCTIONS
[facility]   Statement to Patients  April 1, 2014      In an effort to ensure the large volume of patient prescription refills is processed in the most efficient and expeditious manner, we are asking our patients to assist us by calling your Pharmacy for all prescription refills, this will include also your  Mail Order Pharmacy. The pharmacy will contact our office electronically to continue the refill process. Please do not wait until the last minute to call your pharmacy. We need at least 48 hours (2days) to fill prescriptions. We also encourage you to call your pharmacy before going to  your prescription to make sure it is ready. With regard to controlled substance prescription refill requests (narcotic refills) that need to be picked up at our office, we ask your cooperation by providing us with at least 72 hours (3days) notice that you will need a refill. We will not refill narcotic prescription refill requests after 4:00pm on any weekday, Monday through Thursday, or after 2:00pm on Fridays, or on the weekends. We encourage everyone to explore another way of getting your prescription refill request processed using [employer], our patient web portal through our electronic medical record system. [employer] is an efficient and effective way to communicate your medication request directly to the office and  downloadable as an mily on your smart phone . [employer] also features a review functionality that allows you to view your medication list as well as leave messages for your physician. Are you ready to get connected? If so please review the attatched instructions or speak to any of our staff to get you set up right away! Thank you so much for your cooperation. Should you have any questions please contact our Practice Administrator.     The Physicians and Staff,  [facility]

## 2017-12-05 NOTE — PROGRESS NOTES
Botox Injection Note     2017     Patient:  Nicole Carmona   YOB: 1970  Date of Visit: 2017      Indication: patient has chronic migraine headaches greater than 15 days per month lasting more than 4 hours each. She has failed or not tolerated 2 or more medication preventatives. Written consent was signed and verified by me. Risks and benefits were discussed to include possible cosmetic asymmetry which is not permament or life threatening. Patient name and  was confirmed prior to procedure. Time out performed. Procedure:   Botox concentration: 200 units in 2 ml of preservative-free normal saline. 1:1 dilution. LOT#: [id number]  EXP:  2020    Injections and distribution as follow :      Units/site  Sites Loc Subtotal    procerus 5 1 ML 5   corrugaters 2.5 2 BL 5   frontalis 5 8 BL 40   Temporalis 10 4 BL 40   Occipitalis 10 2 BL 20   Cervical paraspinals 5 4 BL 20   Trapezius 10 4 BL 40         200 units Botox were reconstituted, 170 units injected as above and the remainder was unavoidably wasted. Patient tolerated procedure well. Return in 3 months for repeat injections.     _____________________________   Kev Spencer,   [address], ABPN

## 2017-12-05 NOTE — MR AVS SNAPSHOT
Visit Information Date & Time Provider Department Dept. Phone Encounter #  
 12/5/2017  1:30  Formerly [facility], [address] [place] [address] Neurology Clinic at [facility] [phone] Follow-up Instructions Return in 2 months (on 2/5/2018). Upcoming Health Maintenance Date Due Pneumococcal 19-64 Medium Risk (1 of 1 - PPSV23) 11/19/1988 DTaP/Tdap/Td series (1 - Tdap) 5/21/2005 Influenza Age 5 to Adult 8/1/2017 Allergies as of 12/5/2017  Review Complete On: 12/5/2017 By: Marilyn Prasad LPN Severity Noted Reaction Type Reactions Amitriptyline  12/09/2016    Other (comments) Mental slowing Gabapentin  12/09/2016    Other (comments) Weight gain Current Immunizations  Reviewed on 11/4/2013 Name Date Td 5/20/2005 Not reviewed this visit You Were Diagnosed With   
  
 Codes Comments Chronic migraine w/o aura w/o status migrainosus, not intractable    -  Primary ICD-10-CM: X38.552 ICD-9-CM: 346.70 Vitals BP Pulse Resp Height(growth percentile) Weight(growth percentile) SpO2  
 140/84 95 18 5' 11\" (1.803 m) 231 lb (104.8 kg) 99% BMI Smoking Status 32.22 kg/m2 Never Smoker Vitals History BMI and BSA Data Body Mass Index Body Surface Area  
 32.22 kg/m 2 2.29 m 2 Preferred Pharmacy Pharmacy Name Phone [address] [facility] [phone] Your Updated Medication List  
  
   
This list is accurate as of: 12/5/17  1:45 PM.  Always use your most recent med list. ADVIL PO Take  by mouth. albuterol 90 mcg/actuation inhaler Commonly known as:  PROVENTIL HFA, VENTOLIN HFA, PROAIR HFA Take 2 Puffs by inhalation every four (4) hours as needed. amLODIPine-benazepril 5-10 mg per capsule Commonly known as:  LOTREL  
TAKE 1 CAPSULE DAILY (NEED APPOINTMENT) aspirin-acetaminophen-caffeine 250-250-65 mg per tablet Commonly known as:  EXCEDRIN ES Take 1 Tab by mouth. BENADRYL PO Take  by mouth. * diclofenac EC 50 mg EC tablet Commonly known as:  VOLTAREN Take  by mouth two (2) times a day. * diclofenac EC 75 mg EC tablet Commonly known as:  VOLTAREN  
TAKE 1 TABLET (75 MG TOTAL) BY MOUTH 2 (TWO) TIMES A DAY. fluticasone 50 mcg/actuation nasal spray Commonly known as:  FLONASE  
1 [place] by Both Nostrils route two (2) times a day. LORZONE PO Take  by mouth. 1/2 tab bid .  
  
 magnesium oxide 400 mg tablet Commonly known as:  MAG-OX Take 1 Tab by mouth nightly. melatonin Tab tablet Take 5 mg by mouth nightly. propranolol 10 mg tablet Commonly known as:  INDERAL Take 1 Tab by mouth three (3) times daily. * montelukast 10 mg tablet Commonly known as:  SINGULAIR Take 10 mg by mouth. * SINGULAIR 10 mg tablet Generic drug:  montelukast  
Take 10 mg by mouth daily. * venlafaxine- mg capsule Commonly known as:  EFFEXOR-XR  
  
 * venlafaxine- mg capsule Commonly known as:  EFFEXOR-XR  
TAKE 1 CAPSULE DAILY ZyrTEC 10 mg Cap Generic drug:  Cetirizine Take  by mouth. * Notice: This list has 6 medication(s) that are the same as other medications prescribed for you. Read the directions carefully, and ask your doctor or other care provider to review them with you. We Performed the Following [address] [id number] CPT(R)] Follow-up Instructions Return in 2 months (on 2/5/2018). Patient Instructions PRESCRIPTION REFILL POLICY [facility] Statement to Patients April 1, 2014 In an effort to ensure the large volume of patient prescription refills is processed in the most efficient and expeditious manner, we are asking our patients to assist us by calling your Pharmacy for all prescription refills, this will include also your  Mail Order Pharmacy. The pharmacy will contact our office electronically to continue the refill process. Please do not wait until the last minute to call your pharmacy. We need at least 48 hours (2days) to fill prescriptions. We also encourage you to call your pharmacy before going to  your prescription to make sure it is ready. With regard to controlled substance prescription refill requests (narcotic refills) that need to be picked up at our office, we ask your cooperation by providing us with at least 72 hours (3days) notice that you will need a refill. We will not refill narcotic prescription refill requests after 4:00pm on any weekday, Monday through Thursday, or after 2:00pm on Fridays, or on the weekends. We encourage everyone to explore another way of getting your prescription refill request processed using [employer], our patient web portal through our electronic medical record system. [employer] is an efficient and effective way to communicate your medication request directly to the office and  downloadable as an mily on your smart phone . [employer] also features a review functionality that allows you to view your medication list as well as leave messages for your physician. Are you ready to get connected? If so please review the attatched instructions or speak to any of our staff to get you set up right away! Thank you so much for your cooperation. Should you have any questions please contact our Practice Administrator. The Physicians and Staff,  [facility] Introducing [facility] SERVICES! [facility] introduces [employer] patient portal. Now you can access parts of your medical record, email your doctor's office, and request medication refills online. 1. In your internet browser, go to [email or web address] 2. Click on the First Time User? Click Here link in the Sign In box. You will see the New Member Sign Up page. 3. Enter your [employer] Access Code exactly as it appears below. You will not need to use this code after youve completed the sign-up process. If you do not sign up before the expiration date, you must request a new code. · [employer] Access Code: [email or web address] Expires: 1/30/2018  3:37 PM 
 
4. Enter the last four digits of your Social Security Number (xxxx) and Date of Birth (mm/dd/yyyy) as indicated and click Submit. You will be taken to the next sign-up page. 5. Create a [employer] ID. This will be your [employer] login ID and cannot be changed, so think of one that is secure and easy to remember. 6. Create a [employer] password. You can change your password at any time. 7. Enter your Password Reset Question and Answer. This can be used at a later time if you forget your password. 8. Enter your e-mail address. You will receive e-mail notification when new information is available in [address] 9. Click Sign Up. You can now view and download portions of your medical record. 10. Click the Download Summary menu link to download a portable copy of your medical information. If you have questions, please visit the Frequently Asked Questions section of the [employer] website. Remember, [employer] is NOT to be used for urgent needs. For medical emergencies, dial 911. Now available from your iPhone and Android! Please provide this summary of care documentation to your next provider. Your primary care clinician is listed as [id number] [address]. If you have any questions after today's visit, please call [phone].

## 2018-03-14 ENCOUNTER — OFFICE VISIT (OUTPATIENT)
Dept: NEUROLOGY | Age: 48
End: 2018-03-14

## 2018-03-14 VITALS
HEART RATE: 86 BPM | SYSTOLIC BLOOD PRESSURE: 140 MMHG | RESPIRATION RATE: 16 BRPM | BODY MASS INDEX: 31.42 KG/M2 | HEIGHT: 72 IN | DIASTOLIC BLOOD PRESSURE: 102 MMHG | OXYGEN SATURATION: 97 % | WEIGHT: 232 LBS

## 2018-03-14 DIAGNOSIS — G25.2 TREMOR, COARSE: ICD-10-CM

## 2018-03-14 DIAGNOSIS — Z87.820 HISTORY OF MULTIPLE CONCUSSIONS: ICD-10-CM

## 2018-03-14 DIAGNOSIS — R41.840 ATTENTION DEFICIT: ICD-10-CM

## 2018-03-14 DIAGNOSIS — G43.709 CHRONIC MIGRAINE W/O AURA W/O STATUS MIGRAINOSUS, NOT INTRACTABLE: Primary | ICD-10-CM

## 2018-03-14 NOTE — PROGRESS NOTES
Chief Complaint   Patient presents with    Migraine    Tremors    Memory Loss       HPI    Mr. Mayur Alvares is a 18-year-old gentleman here to follow-up. I see him for chronic migraine headaches, functional tremor, memory loss in the setting of multiple concussions. He received Botox injections on December 5 with good results. He had less than 7 breakthrough headaches. Injections are now wearing off as expected. He has been on multiple medications to include amitriptyline, gabapentin, verapamil, propranolol, Effexor for headache prophylaxis without good control. Additionally he continues to have episodes of memory loss and poor attention and concentration. He has a history of multiple concussions. He feels like the symptoms are getting worse. Normal imaging. Right hand tremor still present and fluctuates. Negative ISADORA scan. Review of Systems   Neurological: Positive for tremors and headaches. Psychiatric/Behavioral: Positive for memory loss. The patient is nervous/anxious. All other systems reviewed and are negative. Past Medical History:   Diagnosis Date    Allergic rhinitis 4/8/2012    Asthma 4/8/2012    Asthma     Cancer ([facility] [address]     skin ca exision from scalp w/ subsequent  local numbness     HX OTHER MEDICAL     rt shoulder posterior instability     Hypertension     Migraine 4/8/2012    Sciatica     Sinus headache 4/8/2012    Tension headache 4/8/2012    Trauma     nasal fracture, 3 concussions, soft ball eye injury     Trauma 06/2016    concussion . dislocated jaw . eval'd by ENT .  Dr. Ruba Collier and Dentist      Family History   Problem Relation Age of Onset    Diabetes Mother     Thyroid Disease Mother     Diabetes Father     Thyroid Disease Sister     Parkinsonism Maternal Uncle     Dementia Paternal Grandfather      Social History     Social History    Marital status:      Spouse name: N/A    Number of children: N/A    Years of education: N/A     Occupational History    Not on file. Social History Main Topics    Smoking status: Never Smoker    Smokeless tobacco: Never Used    Alcohol use Yes      Comment: 1-3 drinks a day of wine a beer    Drug use: No    Sexual activity: Not on file     Other Topics Concern    Not on file     Social History Narrative     Allergies   Allergen Reactions    Amitriptyline Other (comments)     Mental slowing     Gabapentin Other (comments)     Weight gain          Current Outpatient Prescriptions   Medication Sig    onabotulinumtoxinA (BOTOX) 200 unit injection 200 Units by IntraMUSCular route every three (3) months. Inject to head and neck selected muscles in office    diclofenac EC (VOLTAREN) 75 mg EC tablet TAKE 1 TABLET (75 MG TOTAL) BY MOUTH 2 (TWO) TIMES A DAY.  melatonin tab tablet Take 5 mg by mouth nightly.  venlafaxine-SR (EFFEXOR-XR) 150 mg capsule TAKE 1 CAPSULE DAILY    amLODIPine-benazepril (LOTREL) 5-10 mg per capsule TAKE 1 CAPSULE DAILY (NEED APPOINTMENT)    IBUPROFEN (ADVIL PO) Take  by mouth.  aspirin-acetaminophen-caffeine (EXCEDRIN ES) 250-250-65 mg per tablet Take 1 Tab by mouth.  DIPHENHYDRAMINE HCL (BENADRYL PO) Take  by mouth.  magnesium oxide (MAG-OX) 400 mg tablet Take 1 Tab by mouth nightly.  fluticasone (FLONASE) 50 mcg/actuation nasal spray 1 [place] by Both Nostrils route two (2) times a day.  montelukast (SINGULAIR) 10 mg tablet Take 10 mg by mouth daily.  Cetirizine (ZYRTEC) 10 mg Cap Take  by mouth.  albuterol (PROVENTIL HFA, VENTOLIN HFA) 90 mcg/actuation inhaler Take 2 Puffs by inhalation every four (4) hours as needed.  montelukast (SINGULAIR) 10 mg tablet Take 10 mg by mouth.  venlafaxine-SR (EFFEXOR-XR) 150 mg capsule     propranolol (INDERAL) 10 mg tablet Take 1 Tab by mouth three (3) times daily.  CHLORZOXAZONE (LORZONE PO) Take  by mouth. 1/2 tab bid .  diclofenac EC (VOLTAREN) 50 mg EC tablet Take  by mouth two (2) times a day.      No current facility-administered medications for this visit. Neurologic Exam     Mental Status        WD/WN adult in NAD, normal grooming  VSS  A&O x 3    PERRL, nonicteric  Face is symmetric, tongue midline  Speech is fluent and clear  No limb ataxia. Intermittent right hand moderate amplitude sometimes large amplitude tremor that fluctuates. At times disappears. Moving all extemities spontaneously and symmetric  Normal gait    CVS RRR  Lungs nonlabored  Skin is warm and dry         Visit Vitals    BP (!) 140/102    Pulse 86    Resp 16    Ht 5' 11.75\" (1.822 m)    Wt 105.2 kg (232 lb)    SpO2 97%    BMI 31.68 kg/m2       Assessment and Plan   Diagnoses and all orders for this visit:    1. Chronic migraine w/o aura w/o status migrainosus, not intractable    2. History of multiple concussions  -     REFERRAL TO PSYCHOLOGY    3. Tremor, coarse    4. Attention deficit  -     REFERRAL TO PSYCHOLOGY    Other orders  -     onabotulinumtoxinA (BOTOX) 200 unit injection; 200 Units by IntraMUSCular route every three (3) months. Inject to head and neck selected muscles in office      50-year-old gentleman who has chronic migraine headaches. He is failed multiple preventative agents. Good response to Botox injections in that he has had less than 7 severe headaches and also no emergency room or urgent care visits. I recommended he resume Botox injections. I am going to send him to neuropsychology for formal testing. He has a history of multiple concussions and persistent memory deficits. Suspect it could also be high anxiety component as well as possible attentional deficit disorder. Psych testing will help clarify. I am also going to have him consider individual therapy to help with stress management. He will think about it. I would like to see him for Botox injections once we have it and then for follow-up thereafter. I reviewed and decided to continue the current medications.       Chuckie Sosa Deep Barba, DO  NEUROLOGIST  Diplomate ABPN

## 2018-03-14 NOTE — MR AVS SNAPSHOT
[facility] [address] [place] [address] 
[phone] Patient: Emelia Olsen MRN: [id number] :1970 Visit Information Date & Time Provider Department Dept. Phone Encounter #  
 3/14/2018  9:20  [facility] at [address] [id number] Follow-up Instructions Return in about 3 months (around 2018), or and for botox. Routing History Upcoming Health Maintenance Date Due Pneumococcal 19-64 Medium Risk (1 of 1 - PPSV23) 1988 DTaP/Tdap/Td series (1 - Tdap) 2005 Influenza Age 5 to Adult 2017 Allergies as of 3/14/2018  Review Complete On: 2017 By: 2 [facility],  Severity Noted Reaction Type Reactions Amitriptyline  2016    Other (comments) Mental slowing Gabapentin  2016    Other (comments) Weight gain Current Immunizations  Reviewed on 2013 Name Date Td 2005 Not reviewed this visit You Were Diagnosed With   
  
 Codes Comments Chronic migraine w/o aura w/o status migrainosus, not intractable    -  Primary ICD-10-CM: J11.868 ICD-9-CM: 346.70 History of multiple concussions     ICD-10-CM: Z87.820 ICD-9-CM: V15.52 Tremor, coarse     ICD-10-CM: G25.2 ICD-9-CM: 781.0 Attention deficit     ICD-10-CM: R41.840 ICD-9-CM: 799.51 Vitals BP Pulse Resp Height(growth percentile) Weight(growth percentile) SpO2  
 (!) 140/102 86 16 5' 11.75\" (1.822 m) 232 lb (105.2 kg) 97% BMI Smoking Status 31.68 kg/m2 Never Smoker BMI and BSA Data Body Mass Index Body Surface Area  
 31.68 kg/m 2 2.31 m 2 Preferred Pharmacy Pharmacy Name Phone [address] [phone] Your Updated Medication List  
  
   
 This list is accurate as of 3/14/18  9:56 AM.  Always use your most recent med list. ADVIL PO Take  by mouth. albuterol 90 mcg/actuation inhaler Commonly known as:  PROVENTIL HFA, VENTOLIN HFA, PROAIR HFA Take 2 Puffs by inhalation every four (4) hours as needed. amLODIPine-benazepril 5-10 mg per capsule Commonly known as:  LOTREL  
TAKE 1 CAPSULE DAILY (NEED APPOINTMENT)  
  
 aspirin-acetaminophen-caffeine 250-250-65 mg per tablet Commonly known as:  EXCEDRIN ES Take 1 Tab by mouth. BENADRYL PO Take  by mouth. * diclofenac EC 50 mg EC tablet Commonly known as:  VOLTAREN Take  by mouth two (2) times a day. * diclofenac EC 75 mg EC tablet Commonly known as:  VOLTAREN  
TAKE 1 TABLET (75 MG TOTAL) BY MOUTH 2 (TWO) TIMES A DAY. fluticasone 50 mcg/actuation nasal spray Commonly known as:  FLONASE  
1 [place] by Both Nostrils route two (2) times a day. LORZONE PO Take  by mouth. 1/2 tab bid .  
  
 magnesium oxide 400 mg tablet Commonly known as:  MAG-OX Take 1 Tab by mouth nightly. melatonin Tab tablet Take 5 mg by mouth nightly. onabotulinumtoxinA 200 unit injection Commonly known as:  BOTOX  
200 Units by IntraMUSCular route every three (3) months. Inject to head and neck selected muscles in office  
  
 propranolol 10 mg tablet Commonly known as:  INDERAL Take 1 Tab by mouth three (3) times daily. * montelukast 10 mg tablet Commonly known as:  SINGULAIR Take 10 mg by mouth. * SINGULAIR 10 mg tablet Generic drug:  montelukast  
Take 10 mg by mouth daily. * venlafaxine- mg capsule Commonly known as:  EFFEXOR-XR  
  
 * venlafaxine- mg capsule Commonly known as:  EFFEXOR-XR  
TAKE 1 CAPSULE DAILY ZyrTEC 10 mg Cap Generic drug:  Cetirizine Take  by mouth. * Notice:   This list has 6 medication(s) that are the same as other medications prescribed for you. Read the directions carefully, and ask your doctor or other care provider to review them with you. Prescriptions Printed Refills  
 onabotulinumtoxinA (BOTOX) 200 unit injection 3 [place] Units by IntraMUSCular route every three (3) months. Inject to head and neck selected muscles in office Class: Print Route: IntraMUSCular We Performed the Following REFERRAL TO PSYCHOLOGY [OTW24 Custom] Follow-up Instructions Return in about 3 months (around 2018), or and for botox. Referral Information Referral ID Referred By Referred To  
  
 [id number] [place], [address] [place] [address] [address], [place] Phone: [phone] Fax: [phone] Visits Status Start Date End Date 1 New Request 3/14/18 3/14/19 If your referral has a status of pending review or denied, additional information will be sent to support the outcome of this decision. Introducing [facility] & HEALTH SERVICES! [employer] introduces [employer] patient portal. Now you can access parts of your medical record, email your doctor's office, and request medication refills online. 1. In your internet browser, go to [email or web address]://[email or web address]. [email or web address] 2. Click on the First Time User? Click Here link in the Sign In box. You will see the New Member Sign Up page. 3. Enter your [employer] Access Code exactly as it appears below. You will not need to use this code after youve completed the sign-up process. If you do not sign up before the expiration date, you must request a new code. · [employer] Access Code: [email or web address] Expires: 2018  9:13 AM 
 
4. Enter the last four digits of your Social Security Number (xxxx) and Date of Birth (mm/dd/yyyy) as indicated and click Submit. You will be taken to the next sign-up page. 5. Create a MyWebzz ID. This will be your MyWeb[employer] login ID and cannot be changed, so think of one that is secure and easy to remember. 6. Create a MyWebzz password. You can change your password at any time. 7. Enter your Password Reset Question and Answer. This can be used at a later time if you forget your password. 8. Enter your e-mail address. You will receive e-mail notification when new information is available in [address] 9. Click Sign Up. You can now view and download portions of your medical record. 10. Click the Download Summary menu link to download a portable copy of your medical information. If you have questions, please visit the Frequently Asked Questions section of the MyWebzz website. Remember, MyWeb[employer] is NOT to be used for urgent needs. For medical emergencies, dial 911. Now available from your iPhone and Android! Please provide this summary of care documentation to your next provider. Your primary care clinician is listed as [phone] [facility] IV. If you have any questions after today's visit, please call [phone].

## 2018-03-15 ENCOUNTER — DOCUMENTATION ONLY (OUTPATIENT)
Dept: NEUROLOGY | Age: 48
End: 2018-03-15

## 2018-04-02 ENCOUNTER — TELEPHONE (OUTPATIENT)
Dept: NEUROLOGY | Age: 48
End: 2018-04-02

## 2018-04-02 NOTE — TELEPHONE ENCOUNTER
Botox ordered. ETA 4/6/18. [place] updated    Last Inj 12/5/17  OVERDUE for inj. Schedule for end of April once med arrives.

## 2018-04-12 ENCOUNTER — TELEPHONE (OUTPATIENT)
Dept: NEUROLOGY | Age: 48
End: 2018-04-12

## 2018-04-23 ENCOUNTER — OFFICE VISIT (OUTPATIENT)
Dept: NEUROLOGY | Age: 48
End: 2018-04-23

## 2018-04-23 VITALS
DIASTOLIC BLOOD PRESSURE: 86 MMHG | HEART RATE: 79 BPM | OXYGEN SATURATION: 98 % | WEIGHT: 230 LBS | SYSTOLIC BLOOD PRESSURE: 136 MMHG | BODY MASS INDEX: 31.41 KG/M2 | RESPIRATION RATE: 18 BRPM

## 2018-04-23 DIAGNOSIS — G43.719 INTRACTABLE CHRONIC MIGRAINE WITHOUT AURA AND WITHOUT STATUS MIGRAINOSUS: Primary | ICD-10-CM

## 2018-04-23 NOTE — MR AVS SNAPSHOT
[facility] [address] 
[phone] Patient: Jeb Crews MRN: [id number] :1970 Visit Information Date & Time Provider Department Dept. Phone Encounter #  
 2018  1:15 PM [facility] at [address] [id number] Your Appointments 10/18/2018  1:40 PM  
New Patient with Olga Slaughter PsyD  [facility] [address] ([address]) Appt Note: NP Chronic Migraines , Long Term Concussion , Balance Tremor , Memory Issues \"refuse first avail\" kdm 3/20/18 [place] [address] [facility] [address] [facility] [address] [id number] [phone]  
  
   
 [place] [address] [address] [address] Upcoming Health Maintenance Date Due Pneumococcal 19-64 Medium Risk (1 of 1 - PPSV23) 1988 DTaP/Tdap/Td series (1 - Tdap) 2005 Influenza Age 5 to Adult 2017 Allergies as of 2018  Review Complete On: 2018 By: Evert Zendejas DO Severity Noted Reaction Type Reactions Amitriptyline  2016    Other (comments) Mental slowing Gabapentin  2016    Other (comments) Weight gain Current Immunizations  Reviewed on 2013 Name Date Td 2005 Not reviewed this visit You Were Diagnosed With   
  
 Codes Comments Intractable chronic migraine without aura and without status migrainosus    -  Primary ICD-10-CM: K47.109 ICD-9-CM: 346.71 Vitals BP Pulse Resp Weight(growth percentile) SpO2 BMI  
 136/86 79 18 230 lb (104.3 kg) 98% 31.41 kg/m2 Smoking Status Never Smoker BMI and BSA Data Body Mass Index Body Surface Area  
 31.41 kg/m 2 2.3 m 2 Preferred Pharmacy Pharmacy Name Phone Domingo Brown, [facility] [phone] Your Updated Medication List  
  
   
This list is accurate as of 4/23/18  1:31 PM.  Always use your most recent med list. ADVIL PO Take  by mouth. albuterol 90 mcg/actuation inhaler Commonly known as:  PROVENTIL HFA, VENTOLIN HFA, PROAIR HFA Take 2 Puffs by inhalation every four (4) hours as needed. amLODIPine-benazepril 5-10 mg per capsule Commonly known as:  LOTREL  
TAKE 1 CAPSULE DAILY (NEED APPOINTMENT)  
  
 aspirin-acetaminophen-caffeine 250-250-65 mg per tablet Commonly known as:  EXCEDRIN ES Take 1 Tab by mouth. BENADRYL PO Take  by mouth. * diclofenac EC 50 mg EC tablet Commonly known as:  VOLTAREN Take  by mouth two (2) times a day. * diclofenac EC 75 mg EC tablet Commonly known as:  VOLTAREN  
TAKE 1 TABLET (75 MG TOTAL) BY MOUTH 2 (TWO) TIMES A DAY. fluticasone 50 mcg/actuation nasal spray Commonly known as:  FLONASE  
1 [place] by Both Nostrils route two (2) times a day. LORZONE PO Take  by mouth. 1/2 tab bid .  
  
 magnesium oxide 400 mg tablet Commonly known as:  MAG-OX Take 1 Tab by mouth nightly. melatonin Tab tablet Take 5 mg by mouth nightly. onabotulinumtoxinA 200 unit injection Commonly known as:  BOTOX  
200 Units by IntraMUSCular route every three (3) months. Inject to head and neck selected muscles in office  
  
 propranolol 10 mg tablet Commonly known as:  INDERAL Take 1 Tab by mouth three (3) times daily. * montelukast 10 mg tablet Commonly known as:  SINGULAIR Take 10 mg by mouth. * SINGULAIR 10 mg tablet Generic drug:  montelukast  
Take 10 mg by mouth daily. * venlafaxine- mg capsule Commonly known as:  EFFEXOR-XR  
  
 * venlafaxine- mg capsule Commonly known as:  EFFEXOR-XR  
TAKE 1 CAPSULE DAILY ZyrTEC 10 mg Cap Generic drug:  Cetirizine Take  by mouth. * Notice: This list has 6 medication(s) that are the same as other medications prescribed for you. Read the directions carefully, and ask your doctor or other care provider to review them with you. We Performed the Following [address] [id number] CPT(R)] Patient Instructions A Healthy Lifestyle: Care Instructions Your Care Instructions A healthy lifestyle can help you feel good, stay at a healthy weight, and have plenty of energy for both work and play. A healthy lifestyle is something you can share with your whole family. A healthy lifestyle also can lower your risk for serious health problems, such as high blood pressure, heart disease, and diabetes. You can follow a few steps listed below to improve your health and the health of your family. Follow-up care is a key part of your treatment and safety. Be sure to make and go to all appointments, and call your doctor if you are having problems. It's also a good idea to know your test results and keep a list of the medicines you take. How can you care for yourself at home? · Do not eat too much sugar, fat, or fast foods. You can still have dessert and treats now and then. The goal is moderation. · Start small to improve your eating habits. Pay attention to portion sizes, drink less juice and soda pop, and eat more fruits and vegetables. ¨ Eat a healthy amount of food. A 3-ounce serving of meat, for example, is about the size of a deck of cards. Fill the rest of your plate with vegetables and whole grains. ¨ Limit the amount of soda and sports drinks you have every day. Drink more water when you are thirsty. ¨ Eat at least 5 servings of fruits and vegetables every day. It may seem like a lot, but it is not hard to reach this goal. A serving or helping is 1 piece of fruit, 1 cup of vegetables, or 2 cups of leafy, raw vegetables. Have an apple or some carrot sticks as an afternoon snack instead of a candy bar. Try to have fruits and/or vegetables at every meal. 
· Make exercise part of your daily routine. You may want to start with simple activities, such as walking, bicycling, or slow swimming. Try to be active 30 to 60 minutes every day. You do not need to do all 30 to 60 minutes all at once. For example, you can exercise 3 times a day for 10 or 20 minutes. Moderate exercise is safe for most people, but it is always a good idea to talk to your doctor before starting an exercise program. 
· Keep moving. Ayeshamarivel Wiggins the lawn, work in the garden, or [employer]. Take the stairs instead of the elevator at work. · If you smoke, quit. People who smoke have an increased risk for heart attack, stroke, cancer, and other lung illnesses. Quitting is hard, but there are ways to boost your chance of quitting tobacco for good. ¨ Use nicotine gum, patches, or lozenges. ¨ Ask your doctor about stop-smoking programs and medicines. ¨ Keep trying. In addition to reducing your risk of diseases in the future, you will notice some benefits soon after you stop using tobacco. If you have shortness of breath or asthma symptoms, they will likely get better within a few weeks after you quit. · Limit how much alcohol you drink. Moderate amounts of alcohol (up to 2 drinks a day for men, 1 drink a day for women) are okay. But drinking too much can lead to liver problems, high blood pressure, and other health problems. Family health If you have a family, there are many things you can do together to improve your health. · Eat meals together as a family as often as possible. · Eat healthy foods. This includes fruits, vegetables, lean meats and dairy, and whole grains. · Include your family in your fitness plan.  Most people think of activities such as jogging or tennis as the way to fitness, but there are many ways you and your family can be more active. Anything that makes you breathe hard and gets your heart pumping is exercise. Here are some tips: 
¨ Walk to do errands or to take your child to school or the bus. ¨ Go for a family bike ride after dinner instead of watching TV. Where can you learn more? Go to http://casey-zachariah.info/. Enter I034 in the search box to learn more about \"A Healthy Lifestyle: Care Instructions. \" Current as of: May 12, 2017 Content Version: 11.4 © 9628-9878 [employer]. Care instructions adapted under license by [employer] (which disclaims liability or warranty for this information). If you have questions about a medical condition or this instruction, always ask your healthcare professional. [address] any warranty or liability for your use of this information. Introducing [facility] & HEALTH SERVICES! [employer] introduces [employer] patient portal. Now you can access parts of your medical record, email your doctor's office, and request medication refills online. 1. In your internet browser, go to https://[employer]. [employer]/Mobile Health Consumer 2. Click on the First Time User? Click Here link in the Sign In box. You will see the New Member Sign Up page. 3. Enter your [employer] Access Code exactly as it appears below. You will not need to use this code after youve completed the sign-up process. If you do not sign up before the expiration date, you must request a new code. · [employer] Access Code: [email or web address] Expires: 6/12/2018  9:13 AM 
 
4. Enter the last four digits of your Social Security Number (xxxx) and Date of Birth (mm/dd/yyyy) as indicated and click Submit. You will be taken to the next sign-up page. 5. Create a Prezmat ID. This will be your [employer] login ID and cannot be changed, so think of one that is secure and easy to remember. 6. Create a Prez[employer] password. You can change your password at any time. 7. Enter your Password Reset Question and Answer. This can be used at a later time if you forget your password. 8. Enter your e-mail address. You will receive e-mail notification when new information is available in [address] 9. Click Sign Up. You can now view and download portions of your medical record. 10. Click the Download Summary menu link to download a portable copy of your medical information. If you have questions, please visit the Frequently Asked Questions section of the [employer] website. Remember, Talk[employer] is NOT to be used for urgent needs. For medical emergencies, dial 911. Now available from your iPhone and Android! Please provide this summary of care documentation to your next provider. Your primary care clinician is listed as [phone] [facility] IV. If you have any questions after today's visit, please call [phone].

## 2018-05-04 ENCOUNTER — TELEPHONE (OUTPATIENT)
Dept: NEUROLOGY | Age: 48
End: 2018-05-04

## 2018-05-23 ENCOUNTER — TELEPHONE (OUTPATIENT)
Dept: NEUROLOGY | Age: 48
End: 2018-05-23

## 2018-05-23 NOTE — TELEPHONE ENCOUNTER
Can't do neuropsych eval for memory and test for ADD at the same visit. Please call back with any questions. Need a fax saying it is alright to have just neuropsych testing. Please call back with any questions.       fax: [phone]

## 2018-05-24 NOTE — TELEPHONE ENCOUNTER
Okay to just do neuropsych testing at this time. That takes priority over ADD. He can do that testing at a later date. Please fax this to Dr. Arlin Velazquez.

## 2018-05-30 ENCOUNTER — OFFICE VISIT (OUTPATIENT)
Dept: NEUROLOGY | Age: 48
End: 2018-05-30

## 2018-05-30 VITALS
SYSTOLIC BLOOD PRESSURE: 140 MMHG | RESPIRATION RATE: 18 BRPM | HEART RATE: 89 BPM | WEIGHT: 230 LBS | OXYGEN SATURATION: 98 % | DIASTOLIC BLOOD PRESSURE: 86 MMHG | BODY MASS INDEX: 31.41 KG/M2

## 2018-05-30 DIAGNOSIS — F07.81 POSTCONCUSSION SYNDROME: ICD-10-CM

## 2018-05-30 DIAGNOSIS — G43.719 INTRACTABLE CHRONIC MIGRAINE WITHOUT AURA AND WITHOUT STATUS MIGRAINOSUS: ICD-10-CM

## 2018-05-30 DIAGNOSIS — Z87.820 HISTORY OF MULTIPLE CONCUSSIONS: ICD-10-CM

## 2018-05-30 DIAGNOSIS — R20.2 PARESTHESIA AND PAIN OF BOTH UPPER EXTREMITIES: ICD-10-CM

## 2018-05-30 DIAGNOSIS — M79.602 PARESTHESIA AND PAIN OF BOTH UPPER EXTREMITIES: ICD-10-CM

## 2018-05-30 DIAGNOSIS — M79.601 PARESTHESIA AND PAIN OF BOTH UPPER EXTREMITIES: ICD-10-CM

## 2018-05-30 DIAGNOSIS — G25.2 TREMOR, COARSE: ICD-10-CM

## 2018-05-30 DIAGNOSIS — S06.0X0A CONCUSSION WITHOUT LOSS OF CONSCIOUSNESS, INITIAL ENCOUNTER: Primary | ICD-10-CM

## 2018-05-30 DIAGNOSIS — M50.30 DDD (DEGENERATIVE DISC DISEASE), CERVICAL: ICD-10-CM

## 2018-05-30 RX ORDER — METHYLPREDNISOLONE 4 MG/1
TABLET ORAL
Qty: 1 DOSE PACK | Refills: 0 | Status: SHIPPED | OUTPATIENT
Start: 2018-05-30 | End: 2018-07-11 | Stop reason: ALTCHOICE

## 2018-05-30 RX ORDER — METHYLPREDNISOLONE 4 MG/1
TABLET ORAL
Qty: 1 DOSE PACK | Refills: 0 | Status: SHIPPED | OUTPATIENT
Start: 2018-05-30 | End: 2018-05-30 | Stop reason: SDUPTHER
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
## 2018-05-30 NOTE — MR AVS SNAPSHOT
[facility] [place] [address] [place] 
[phone] Patient: Mini Nicolas MRN: [id number] :[id number] Visit Information Date & Time Provider Department Dept. Phone Encounter #  
 2018 10:20 AM DO [facility] at [address] [place] [address] [id number] Follow-up Instructions Return in about 6 weeks (around 2018). Routing History Your Appointments 2018  9:00 AM  
PROCEDURE with [address], DO Briana Rhodes [facility] at [facility] [address]) Appt Note: botox inj / [address] [place] [address] [facility] [address]  
[address] [place] [address] [facility]  [id number]  
  
    
 10/18/2018  1:40 PM  
New Patient with Pepito Morrow PsyD  [facility] ([address]) Appt Note: NP Chronic Migraines , Long Term Concussion , Balance Tremor , Memory Issues \"refuse first avail\" kdm 3/20/18 [place] [address] [facility] [address] [facility] 99 [id number] [phone]  
  
   
 [place] [address] [facility] [address] [address] [address] Upcoming Health Maintenance Date Due Pneumococcal 19-64 Medium Risk (1 of 1 - PPSV23) 1988 DTaP/Tdap/Td series (1 - Tdap) 2005 Influenza Age 5 to Adult 2018 Allergies as of 2018  Review Complete On: 2018 By: Lamberto Mora LPN Severity Noted Reaction Type Reactions Amitriptyline  2016    Other (comments) Mental slowing Gabapentin  2016    Other (comments) Weight gain Current Immunizations  Reviewed on 2013 Name Date Td 2005 Not reviewed this visit You Were Diagnosed With   
  
 Codes Comments Concussion without loss of consciousness, initial encounter    -  Primary ICD-10-CM: S06.0X0A 
ICD-9-CM: 850.0 Postconcussion syndrome     ICD-10-CM: F07.81 ICD-9-CM: 310.2 Intractable chronic migraine without aura and without status migrainosus     ICD-10-CM: U30.375 ICD-9-CM: 346.71 Tremor, coarse     ICD-10-CM: G25.2 ICD-9-CM: 781.0 History of multiple concussions     ICD-10-CM: Z87.820 ICD-9-CM: V15.52   
 DDD (degenerative disc disease), cervical     ICD-10-CM: M50.30 ICD-9-CM: 722.4 Paresthesia and pain of both upper extremities     ICD-10-CM: R20.2, M79.601, M79.602 ICD-9-CM: 782.0, 729.5 Vitals BP Pulse Resp Weight(growth percentile) SpO2 BMI  
 140/86 89 18 230 lb (104.3 kg) 98% 31.41 kg/m2 Smoking Status Never Smoker Vitals History BMI and BSA Data Body Mass Index Body Surface Area  
 31.41 kg/m 2 2.3 m 2 Preferred Pharmacy Pharmacy Name Phone [address] Velas[address] [facility] [phone] Your Updated Medication List  
  
   
This list is accurate as of 5/30/18 10:44 AM.  Always use your most recent med list. ADVIL PO Take  by mouth. albuterol 90 mcg/actuation inhaler Commonly known as:  PROVENTIL HFA, VENTOLIN HFA, PROAIR HFA Take 2 Puffs by inhalation every four (4) hours as needed. amLODIPine-benazepril 5-10 mg per capsule Commonly known as:  LOTREL  
TAKE 1 CAPSULE DAILY (NEED APPOINTMENT)  
  
 aspirin-acetaminophen-caffeine 250-250-65 mg per tablet Commonly known as:  EXCEDRIN ES Take 1 Tab by mouth. BENADRYL PO Take  by mouth. * diclofenac EC 50 mg EC tablet Commonly known as:  VOLTAREN Take  by mouth two (2) times a day. * diclofenac EC 75 mg EC tablet Commonly known as:  VOLTAREN  
TAKE 1 TABLET (75 MG TOTAL) BY MOUTH 2 (TWO) TIMES A DAY. fluticasone 50 mcg/actuation nasal spray Commonly known as:  FLONASE  
1 [place] by Both Nostrils route two (2) times a day.   
  
 LORZONE PO  
 Take  by mouth. 1/2 tab bid .  
  
 magnesium oxide 400 mg tablet Commonly known as:  MAG-OX Take 1 Tab by mouth nightly. melatonin Tab tablet Take 5 mg by mouth nightly. methylPREDNISolone 4 mg tablet Commonly known as:  Leander Vaca Per package instructions  
  
 onabotulinumtoxinA 200 unit injection Commonly known as:  BOTOX  
200 Units by IntraMUSCular route every three (3) months. Inject to head and neck selected muscles in office  
  
 propranolol 10 mg tablet Commonly known as:  INDERAL Take 1 Tab by mouth three (3) times daily. * montelukast 10 mg tablet Commonly known as:  SINGULAIR Take 10 mg by mouth. * SINGULAIR 10 mg tablet Generic drug:  montelukast  
Take 10 mg by mouth daily. * venlafaxine- mg capsule Commonly known as:  EFFEXOR-XR  
  
 * venlafaxine- mg capsule Commonly known as:  EFFEXOR-XR  
TAKE 1 CAPSULE DAILY ZyrTEC 10 mg Cap Generic drug:  Cetirizine Take  by mouth. * Notice: This list has 6 medication(s) that are the same as other medications prescribed for you. Read the directions carefully, and ask your doctor or other care provider to review them with you. Prescriptions Sent to Pharmacy Refills  
 methylPREDNISolone (MEDROL DOSEPACK) 4 mg tablet 0 Sig: Per package instructions Class: Normal  
 Pharmacy: [address] [place] [address], [address] [place] [address] #: [phone] We Performed the Following REFERRAL TO PHYSICAL THERAPY [TYH88 Custom] Comments:  
 Concussion therapy with vestibular therapy Follow-up Instructions Return in about 6 weeks (around 7/11/2018). To-Do List   
 05/30/2018 Imaging:  MRI BRAIN WO CONT   
  
 05/30/2018 Imaging:  MRI CERV SPINE WO CONT Referral Information Referral ID Referred By Referred To  
  
 [id number] Silverio Waldrop Not Available Visits Status Start Date End Date 1 New Request 5/30/18 5/30/19 If your referral has a status of pending review or denied, additional information will be sent to support the outcome of this decision. Patient Instructions A Healthy Lifestyle: Care Instructions Your Care Instructions A healthy lifestyle can help you feel good, stay at a healthy weight, and have plenty of energy for both work and play. A healthy lifestyle is something you can share with your whole family. A healthy lifestyle also can lower your risk for serious health problems, such as high blood pressure, heart disease, and diabetes. You can follow a few steps listed below to improve your health and the health of your family. Follow-up care is a key part of your treatment and safety. Be sure to make and go to all appointments, and call your doctor if you are having problems. It's also a good idea to know your test results and keep a list of the medicines you take. How can you care for yourself at home? · Do not eat too much sugar, fat, or fast foods. You can still have dessert and treats now and then. The goal is moderation. · Start small to improve your eating habits. Pay attention to portion sizes, drink less juice and soda pop, and eat more fruits and vegetables. ¨ Eat a healthy amount of food. A 3-ounce serving of meat, for example, is about the size of a deck of cards. Fill the rest of your plate with vegetables and whole grains. ¨ Limit the amount of soda and sports drinks you have every day. Drink more water when you are thirsty. ¨ Eat at least 5 servings of fruits and vegetables every day. It may seem like a lot, but it is not hard to reach this goal. A serving or helping is 1 piece of fruit, 1 cup of vegetables, or 2 cups of leafy, raw vegetables. Have an apple or some carrot sticks as an afternoon snack instead of a candy bar.  Try to have fruits and/or vegetables at every meal. 
 · Make exercise part of your daily routine. You may want to start with simple activities, such as walking, bicycling, or slow swimming. Try to be active 30 to 60 minutes every day. You do not need to do all 30 to 60 minutes all at once. For example, you can exercise 3 times a day for 10 or 20 minutes. Moderate exercise is safe for most people, but it is always a good idea to talk to your doctor before starting an exercise program. 
· Keep moving. [place] the lawn, work in the garden, or [employer]. Take the stairs instead of the elevator at work. · If you smoke, quit. People who smoke have an increased risk for heart attack, stroke, cancer, and other lung illnesses. Quitting is hard, but there are ways to boost your chance of quitting tobacco for good. ¨ Use nicotine gum, patches, or lozenges. ¨ Ask your doctor about stop-smoking programs and medicines. ¨ Keep trying. In addition to reducing your risk of diseases in the future, you will notice some benefits soon after you stop using tobacco. If you have shortness of breath or asthma symptoms, they will likely get better within a few weeks after you quit. · Limit how much alcohol you drink. Moderate amounts of alcohol (up to 2 drinks a day for men, 1 drink a day for women) are okay. But drinking too much can lead to liver problems, high blood pressure, and other health problems. Family health If you have a family, there are many things you can do together to improve your health. · Eat meals together as a family as often as possible. · Eat healthy foods. This includes fruits, vegetables, lean meats and dairy, and whole grains. · Include your family in your fitness plan. Most people think of activities such as jogging or tennis as the way to fitness, but there are many ways you and your family can be more active. Anything that makes you breathe hard and gets your heart pumping is exercise. Here are some tips: ¨ Walk to do errands or to take your child to school or the bus. ¨ Go for a family bike ride after dinner instead of watching TV. Where can you learn more? Go to http://casey-zachariah.info/. Enter I775 in the search box to learn more about \"A Healthy Lifestyle: Care Instructions. \" Current as of: May 12, 2017 Content Version: 11.4 © 9384-0335 [employer]. Care instructions adapted under license by [employer] (which disclaims liability or warranty for this information). If you have questions about a medical condition or this instruction, always ask your healthcare professional. [address] any warranty or liability for your use of this information. Introducing hospitals & HEALTH SERVICES! [employer] introduces Inktank patient portal. Now you can access parts of your medical record, email your doctor's office, and request medication refills online. 1. In your internet browser, go to [email or web address] 2. Click on the First Time User? Click Here link in the Sign In box. You will see the New Member Sign Up page. 3. Enter your Inktank Access Code exactly as it appears below. You will not need to use this code after youve completed the sign-up process. If you do not sign up before the expiration date, you must request a new code. · Inktank Access Code: [email or web address] Expires: 6/12/2018  9:13 AM 
 
4. Enter the last four digits of your Social Security Number (xxxx) and Date of Birth (mm/dd/yyyy) as indicated and click Submit. You will be taken to the next sign-up page. 5. Create a Inktank ID. This will be your Inktank login ID and cannot be changed, so think of one that is secure and easy to remember. 6. Create a Inktank password. You can change your password at any time. 7. Enter your Password Reset Question and Answer. This can be used at a later time if you forget your password. 8. Enter your e-mail address. You will receive e-mail notification when new information is available in [address] 9. Click Sign Up. You can now view and download portions of your medical record. 10. Click the Download Summary menu link to download a portable copy of your medical information. If you have questions, please visit the Frequently Asked Questions section of the ShareThe website. Remember, ShareThe is NOT to be used for urgent needs. For medical emergencies, dial 911. Now available from your iPhone and Android! Please provide this summary of care documentation to your next provider. Your primary care clinician is listed as [phone] [address] IV. If you have any questions after today's visit, please call [phone].

## 2018-05-30 NOTE — PATIENT INSTRUCTIONS

## 2018-05-31 ENCOUNTER — DOCUMENTATION ONLY (OUTPATIENT)
Dept: NEUROLOGY | Age: 48
End: 2018-05-31

## 2018-06-01 RX ORDER — VENLAFAXINE HYDROCHLORIDE 150 MG/1
CAPSULE, EXTENDED RELEASE ORAL
Qty: 90 CAP | Refills: 1 | Status: SHIPPED | OUTPATIENT
Start: 2018-06-01 | End: 2018-11-27 | Stop reason: SDUPTHER

## 2018-06-04 ENCOUNTER — DOCUMENTATION ONLY (OUTPATIENT)
Dept: NEUROLOGY | Age: 48
End: 2018-06-04

## 2018-06-04 NOTE — PROGRESS NOTES
Called [employer] for Peer to Peer for patient's MRI Brain and C-Spine. Auth# [id number] good  6/4/18 thru 7/3/18. Olivia Harris at [phone] and left this information on her VM.

## 2018-06-07 ENCOUNTER — HOSPITAL ENCOUNTER (OUTPATIENT)
Dept: MRI IMAGING | Age: 48
Discharge: HOME | End: 2018-06-07
Payer: COMMERCIAL

## 2018-06-07 DIAGNOSIS — R20.2 PARESTHESIA AND PAIN OF BOTH UPPER EXTREMITIES: ICD-10-CM

## 2018-06-07 DIAGNOSIS — S06.0X0A CONCUSSION WITHOUT LOSS OF CONSCIOUSNESS, INITIAL ENCOUNTER: ICD-10-CM

## 2018-06-07 DIAGNOSIS — Z87.820 HISTORY OF MULTIPLE CONCUSSIONS: ICD-10-CM

## 2018-06-07 DIAGNOSIS — M79.602 PARESTHESIA AND PAIN OF BOTH UPPER EXTREMITIES: ICD-10-CM

## 2018-06-07 DIAGNOSIS — M50.30 DDD (DEGENERATIVE DISC DISEASE), CERVICAL: ICD-10-CM

## 2018-06-07 DIAGNOSIS — M79.601 PARESTHESIA AND PAIN OF BOTH UPPER EXTREMITIES: ICD-10-CM

## 2018-06-07 PROCEDURE — 70551 MRI BRAIN STEM W/O DYE: CPT

## 2018-06-07 PROCEDURE — 72141 MRI NECK SPINE W/O DYE: CPT

## 2018-06-08 ENCOUNTER — TELEPHONE (OUTPATIENT)
Dept: NEUROLOGY | Age: 48
End: 2018-06-08

## 2018-06-08 NOTE — TELEPHONE ENCOUNTER
Please let him know that his MRI brain looks great. Very normal.    The MRI of his upper spinal cord overall is intact regarding the spinal cord itself. He does have some progressive arthritic changes in the spine more so on the right side. Continue with the therapies and progress.

## 2018-06-12 NOTE — TELEPHONE ENCOUNTER
Spoke with patient. Informed him of MRI results, per Dr. Mirela Canales. Patient was given an opportunity to ask questions, repeated information, and verbalized understanding.

## 2018-06-18 ENCOUNTER — TELEPHONE (OUTPATIENT)
Dept: NEUROLOGY | Age: 48
End: 2018-06-18

## 2018-06-18 NOTE — TELEPHONE ENCOUNTER
Needs order for concussion therapy faxed before his appt.  CaitlinChristopher Sue Pt has appt today 6/18 at 2:00pm.  Fax: [phone]

## 2018-06-19 ENCOUNTER — APPOINTMENT (OUTPATIENT)
Dept: PHYSICAL THERAPY | Age: 48
End: 2018-06-19

## 2018-06-26 ENCOUNTER — DOCUMENTATION ONLY (OUTPATIENT)
Dept: NEUROLOGY | Age: 48
End: 2018-06-26

## 2018-06-26 ENCOUNTER — TELEPHONE (OUTPATIENT)
Dept: NEUROLOGY | Age: 48
End: 2018-06-26

## 2018-06-26 NOTE — PROGRESS NOTES
Rcv'd call from [facility] to get verbal on his Botox script. Advised this had been faxed on 6/7/18, but they have no record of it. Read script to pharmacist today as written on 6/7/18.

## 2018-07-11 ENCOUNTER — OFFICE VISIT (OUTPATIENT)
Dept: NEUROLOGY | Age: 48
End: 2018-07-11

## 2018-07-11 VITALS
HEART RATE: 87 BPM | WEIGHT: 230 LBS | OXYGEN SATURATION: 98 % | HEIGHT: 71 IN | BODY MASS INDEX: 32.2 KG/M2 | DIASTOLIC BLOOD PRESSURE: 92 MMHG | SYSTOLIC BLOOD PRESSURE: 138 MMHG | RESPIRATION RATE: 14 BRPM

## 2018-07-11 DIAGNOSIS — Z87.820 HISTORY OF MULTIPLE CONCUSSIONS: ICD-10-CM

## 2018-07-11 DIAGNOSIS — S06.0X0S CONCUSSION WITHOUT LOSS OF CONSCIOUSNESS, SEQUELA (HCC): Primary | ICD-10-CM

## 2018-07-11 DIAGNOSIS — F07.81 POSTCONCUSSION SYNDROME: ICD-10-CM

## 2018-07-11 DIAGNOSIS — R41.840 ATTENTION DEFICIT: ICD-10-CM

## 2018-07-11 RX ORDER — MEMANTINE HYDROCHLORIDE 5 MG/1
5 TABLET ORAL DAILY
Qty: 30 TAB | Refills: 3 | Status: SHIPPED | OUTPATIENT
Start: 2018-07-11 | End: 2018-08-27 | Stop reason: SDUPTHER

## 2018-07-11 NOTE — PATIENT INSTRUCTIONS

## 2018-07-11 NOTE — MR AVS SNAPSHOT
[facility] [place] [address] 
[phone] Patient: Sandie Matute MRN: [id number] :[id number] Visit Information Date & Time Provider Department Dept. Phone Encounter #  
 2018 10:30 AM DO [facility] at [address] [id number] Follow-up Instructions Return in about 3 months (around 10/11/2018). Routing History Your Appointments 2018  3:15 PM  
ESTABLISHED PATIENT with MD Jaciel Wilde [address], [address] ([address]) Appt Note: brief check  
 [address] [place] [address]  
[phone]  
  
   
 [address] [address]  
  
    
 2018  9:00 AM  
PROCEDURE with DO [facility] at [address] [address] [place] [address]) Appt Note: botox inj / [address] [address] [facility] [address]  
[address] [place] [address] [facility]  [address]  
  
    
 10/18/2018  1:40 PM  
New Patient with Hao Enamorado PsyD  [facility] ([address]) Appt Note: NP Chronic Migraines , Long Term Concussion , Balance Tremor , Memory Issues \"refuse first avail\" kdm 3/20/18 [place] [address] [facility] [address] [facility] [address] [phone]  
  
   
 [place] [address] [facility] [address] [address] [address] Upcoming Health Maintenance Date Due Pneumococcal 19-64 Medium Risk (1 of 1 - PPSV23) 1988 DTaP/Tdap/Td series (1 - Tdap) 2005 Influenza Age 5 to Adult 2018 Allergies as of 2018  Review Complete On: 2018 By: Jeny Locke Severity Noted Reaction Type Reactions Amitriptyline  2016    Other (comments) Mental slowing Gabapentin  2016    Other (comments) Weight gain Current Immunizations  Reviewed on 11/4/2013 Name Date Td 5/20/2005 Not reviewed this visit You Were Diagnosed With   
  
 Codes Comments Concussion without loss of consciousness, sequela ([facility] [address]    -  Primary ICD-10-CM: S06.0X0S 
ICD-9-CM: 907.0 Postconcussion syndrome     ICD-10-CM: F07.81 ICD-9-CM: 310.2 History of multiple concussions     ICD-10-CM: Z87.820 ICD-9-CM: V15.52 Attention deficit     ICD-10-CM: R41.840 ICD-9-CM: 799.51 Vitals BP Pulse Resp Height(growth percentile) Weight(growth percentile) SpO2  
 (!) 138/92 87 14 5' 11\" (1.803 m) 230 lb (104.3 kg) 98% BMI Smoking Status 32.08 kg/m2 Never Smoker Vitals History BMI and BSA Data Body Mass Index Body Surface Area 32.08 kg/m 2 2.29 m 2 Preferred Pharmacy Pharmacy Name Phone [facility]/PHARMACY #[id number] ParsonsSophie Case 60 [phone] Your Updated Medication List  
  
   
This list is accurate as of 7/11/18 11:16 AM.  Always use your most recent med list. ADVIL PO Take  by mouth. albuterol 90 mcg/actuation inhaler Commonly known as:  PROVENTIL HFA, VENTOLIN HFA, PROAIR HFA Take 2 Puffs by inhalation every four (4) hours as needed. amLODIPine-benazepril 5-10 mg per capsule Commonly known as:  LOTREL  
TAKE 1 CAPSULE DAILY (NEED APPOINTMENT)  
  
 aspirin-acetaminophen-caffeine 250-250-65 mg per tablet Commonly known as:  EXCEDRIN ES Take 1 Tab by mouth. BENADRYL PO Take  by mouth. * diclofenac EC 50 mg EC tablet Commonly known as:  VOLTAREN Take  by mouth two (2) times a day. * diclofenac EC 75 mg EC tablet Commonly known as:  VOLTAREN  
TAKE 1 TABLET (75 MG TOTAL) BY MOUTH 2 (TWO) TIMES A DAY. fluticasone 50 mcg/actuation nasal spray Commonly known as:  FLONASE  
1 [place] by Both Nostrils route two (2) times a day. LORZONE PO Take  by mouth. 1/2 tab bid . magnesium oxide 400 mg tablet Commonly known as:  MAG-OX Take 1 Tab by mouth nightly. melatonin Tab tablet Take 5 mg by mouth nightly. memantine 5 mg tablet Commonly known as:  Alicia Redden Take 1 Tab by mouth daily. onabotulinumtoxinA 200 unit injection Commonly known as:  BOTOX Inject selected muscles head and neck bilaterally every 12 weeks  Indications: MIGRAINE PREVENTION  
  
 * montelukast 10 mg tablet Commonly known as:  SINGULAIR Take 10 mg by mouth. * SINGULAIR 10 mg tablet Generic drug:  montelukast  
Take 10 mg by mouth daily. venlafaxine- mg capsule Commonly known as:  EFFEXOR-XR  
TAKE 1 CAPSULE DAILY ZyrTEC 10 mg Cap Generic drug:  Cetirizine Take  by mouth. * Notice: This list has 4 medication(s) that are the same as other medications prescribed for you. Read the directions carefully, and ask your doctor or other care provider to review them with you. Prescriptions Sent to Pharmacy Refills  
 memantine (NAMENDA) 5 mg tablet 3 Sig: Take 1 Tab by mouth daily. Class: Normal  
 Pharmacy: [facility]/pharmacy #[id number]- [place], [facility] #: [phone] Route: Oral  
  
Follow-up Instructions Return in about 3 months (around 10/11/2018). Patient Instructions A Healthy Lifestyle: Care Instructions Your Care Instructions A healthy lifestyle can help you feel good, stay at a healthy weight, and have plenty of energy for both work and play. A healthy lifestyle is something you can share with your whole family. A healthy lifestyle also can lower your risk for serious health problems, such as high blood pressure, heart disease, and diabetes. You can follow a few steps listed below to improve your health and the health of your family. Follow-up care is a key part of your treatment and safety.  Be sure to make and go to all appointments, and call your doctor if you are having problems. It's also a good idea to know your test results and keep a list of the medicines you take. How can you care for yourself at home? · Do not eat too much sugar, fat, or fast foods. You can still have dessert and treats now and then. The goal is moderation. · Start small to improve your eating habits. Pay attention to portion sizes, drink less juice and soda pop, and eat more fruits and vegetables. ¨ Eat a healthy amount of food. A 3-ounce serving of meat, for example, is about the size of a deck of cards. Fill the rest of your plate with vegetables and whole grains. ¨ Limit the amount of soda and sports drinks you have every day. Drink more water when you are thirsty. ¨ Eat at least 5 servings of fruits and vegetables every day. It may seem like a lot, but it is not hard to reach this goal. A serving or helping is 1 piece of fruit, 1 cup of vegetables, or 2 cups of leafy, raw vegetables. Have an apple or some carrot sticks as an afternoon snack instead of a candy bar. Try to have fruits and/or vegetables at every meal. 
· Make exercise part of your daily routine. You may want to start with simple activities, such as walking, bicycling, or slow swimming. Try to be active 30 to 60 minutes every day. You do not need to do all 30 to 60 minutes all at once. For example, you can exercise 3 times a day for 10 or 20 minutes. Moderate exercise is safe for most people, but it is always a good idea to talk to your doctor before starting an exercise program. 
· Keep moving. Gen[employer] Schooling the lawn, work in the garden, or [place]. Take the stairs instead of the elevator at work. · If you smoke, quit. People who smoke have an increased risk for heart attack, stroke, cancer, and other lung illnesses. Quitting is hard, but there are ways to boost your chance of quitting tobacco for good. ¨ Use nicotine gum, patches, or lozenges. ¨ Ask your doctor about stop-smoking programs and medicines. ¨ Keep trying. In addition to reducing your risk of diseases in the future, you will notice some benefits soon after you stop using tobacco. If you have shortness of breath or asthma symptoms, they will likely get better within a few weeks after you quit. · Limit how much alcohol you drink. Moderate amounts of alcohol (up to 2 drinks a day for men, 1 drink a day for women) are okay. But drinking too much can lead to liver problems, high blood pressure, and other health problems. Family health If you have a family, there are many things you can do together to improve your health. · Eat meals together as a family as often as possible. · Eat healthy foods. This includes fruits, vegetables, lean meats and dairy, and whole grains. · Include your family in your fitness plan. Most people think of activities such as jogging or tennis as the way to fitness, but there are many ways you and your family can be more active. Anything that makes you breathe hard and gets your heart pumping is exercise. Here are some tips: 
¨ Walk to do errands or to take your child to school or the bus. ¨ Go for a family bike ride after dinner instead of watching TV. Where can you learn more? Go to http://casey-zachariah.info/. Enter B078 in the search box to learn more about \"A Healthy Lifestyle: Care Instructions. \" Current as of: December 7, 2017 Content Version: 11.7 © 5734-1451 [employer]. Care instructions adapted under license by [employer] (which disclaims liability or warranty for this information). If you have questions about a medical condition or this instruction, always ask your healthcare professional. [place] [address] any warranty or liability for your use of this information. Introducing [facility]! [address] [facility] introduces [employer] patient portal. Now you can access parts of your medical record, email your doctor's office, and request medication refills online. 1. In your internet browser, go to [email or web address] 2. Click on the First Time User? Click Here link in the Sign In box. You will see the New Member Sign Up page. 3. Enter your [employer] Access Code exactly as it appears below. You will not need to use this code after youve completed the sign-up process. If you do not sign up before the expiration date, you must request a new code. · [employer] Access Code: [email or web address] Expires: 9/16/2018 11:19 AM 
 
4. Enter the last four digits of your Social Security Number (xxxx) and Date of Birth (mm/dd/yyyy) as indicated and click Submit. You will be taken to the next sign-up page. 5. Create a Nex[employer] ID. This will be your [employer] login ID and cannot be changed, so think of one that is secure and easy to remember. 6. Create a [employer] password. You can change your password at any time. 7. Enter your Password Reset Question and Answer. This can be used at a later time if you forget your password. 8. Enter your e-mail address. You will receive e-mail notification when new information is available in [address] 9. Click Sign Up. You can now view and download portions of your medical record. 10. Click the Download Summary menu link to download a portable copy of your medical information. If you have questions, please visit the Frequently Asked Questions section of the [employer] website. Remember, [employer] is NOT to be used for urgent needs. For medical emergencies, dial 911. Now available from your iPhone and Android! Please provide this summary of care documentation to your next provider. Your primary care clinician is listed as [id number] Gomes IV. If you have any questions after today's visit, please call [phone].

## 2018-07-11 NOTE — PROGRESS NOTES
Chief Complaint   Patient presents with    Headache       HPI    79-year-old gentleman whom I follow long-term for chronic headaches, anxiety, functional tremor in the setting of multiple head injuries. Since his last visit he has been participating a concussion therapy. However, within the last 7-10 days after traveling long distance [place] he hit his head a couple more times while unloading or packing the vehicle. No loss of consciousness. He keeps hitting the back of his head. This also happens whenever he is going into the basement. We repeated imaging since the last visit because of the worsening symptoms to include brain and cervical spine both of which were benign. He still having frequent headaches. Anxiety is rising. He is concerned that he may developed cognitive dysfunction in the future. He has not had formal neuropsych testing done yet but it is scheduled for later this month. He remains on Effexor 150 mg daily which he thinks has been helpful for his anxiety. He had Botox on April 23 with good results and now the medication is wearing off. Background history:  ISADORA scan negative for Parkinson's   multiple migraine medications to include amitriptyline, gabapentin, verapamil, propranolol, Effexor, memantine, Botox    Review of Systems   Neurological: Positive for tremors and headaches. Psychiatric/Behavioral: Positive for memory loss. The patient is nervous/anxious. All other systems reviewed and are negative. Past Medical History:   Diagnosis Date    Allergic rhinitis 4/8/2012    Asthma 4/8/2012    Asthma     Cancer ([facility])     skin ca exision from scalp w/ subsequent  local numbness     HX OTHER MEDICAL     rt shoulder posterior instability     Hypertension     Migraine 4/8/2012    Sciatica     Sinus headache 4/8/2012    Tension headache 4/8/2012    Trauma     nasal fracture, 3 concussions, soft ball eye injury     Trauma 06/2016    concussion . dislocated jaw .  eval'd by ENT . Dr. Lauri Barnes and Dentist      Family History   Problem Relation Age of Onset    Diabetes Mother     Thyroid Disease Mother     Diabetes Father     Thyroid Disease Sister     Parkinsonism Maternal Uncle     Dementia Paternal Grandfather      Social History     Social History    Marital status:      Spouse name: N/A    Number of children: N/A    Years of education: N/A     Occupational History    Not on file. Social History Main Topics    Smoking status: Never Smoker    Smokeless tobacco: Never Used    Alcohol use Yes      Comment: 1-3 drinks a day of wine a beer    Drug use: No    Sexual activity: Not on file     Other Topics Concern    Not on file     Social History Narrative     Allergies   Allergen Reactions    Amitriptyline Other (comments)     Mental slowing     Gabapentin Other (comments)     Weight gain          Current Outpatient Prescriptions   Medication Sig    memantine (NAMENDA) 5 mg tablet Take 1 Tab by mouth daily.  amLODIPine-benazepril (LOTREL) 5-10 mg per capsule TAKE 1 CAPSULE DAILY (NEED APPOINTMENT)    onabotulinumtoxinA (BOTOX) 200 unit injection Inject selected muscles head and neck bilaterally every 12 weeks  Indications: MIGRAINE PREVENTION    venlafaxine-SR (EFFEXOR-XR) 150 mg capsule TAKE 1 CAPSULE DAILY    diclofenac EC (VOLTAREN) 75 mg EC tablet TAKE 1 TABLET (75 MG TOTAL) BY MOUTH 2 (TWO) TIMES A DAY.  melatonin tab tablet Take 5 mg by mouth nightly.  montelukast (SINGULAIR) 10 mg tablet Take 10 mg by mouth.  IBUPROFEN (ADVIL PO) Take  by mouth.  aspirin-acetaminophen-caffeine (EXCEDRIN ES) 250-250-65 mg per tablet Take 1 Tab by mouth.  DIPHENHYDRAMINE HCL (BENADRYL PO) Take  by mouth.  magnesium oxide (MAG-OX) 400 mg tablet Take 1 Tab by mouth nightly.  CHLORZOXAZONE (LORZONE PO) Take  by mouth. 1/2 tab bid .  fluticasone (FLONASE) 50 mcg/actuation nasal spray 1 [place] by Both Nostrils route two (2) times a day.     diclofenac EC (VOLTAREN) 50 mg EC tablet Take  by mouth two (2) times a day.  montelukast (SINGULAIR) 10 mg tablet Take 10 mg by mouth daily.  Cetirizine (ZYRTEC) 10 mg Cap Take  by mouth.  albuterol (PROVENTIL HFA, VENTOLIN HFA) 90 mcg/actuation inhaler Take 2 Puffs by inhalation every four (4) hours as needed. No current facility-administered medications for this visit. Neurologic Exam     Mental Status        WD/WN adult in NAD, normal grooming  VSS  A&O x 3    PERRL, nonicteric  Face is symmetric, tongue midline  Speech is fluent and clear  No limb ataxia. fluctuating right arm tremor that sometimes resolves completely  Moving all extemities spontaneously and symmetric  Normal gait    CVS RRR  Lungs nonlabored  Skin is warm and dry         Visit Vitals    BP (!) 138/92    Pulse 87    Resp 14    Ht 5' 11\" (1.803 m)    Wt 104.3 kg (230 lb)    SpO2 98%    BMI 32.08 kg/m2       Assessment and Plan   Diagnoses and all orders for this visit:    1. Concussion without loss of consciousness, sequela ([facility] [address]    2. Postconcussion syndrome    3. History of multiple concussions    4. Attention deficit    Other orders  -     memantine (NAMENDA) 5 mg tablet; Take 1 Tab by mouth daily. 41-year-old gentleman who continues to have chronic posttraumatic headache chronic migraine. We will continue Botox injections on his other medications. He is due for injections end of July early August.  Additionally, we talked about his recurrent repetitive head injuries. There is evidence to suggest a link between recurrent head injuries and early potential development of dementia. We will have to follow clinically. Continue concussion therapy. I discussed with him starting memantine off label for his cognition and headaches which has been used in small studies. He will consider starting this medication which I have ordered at 5 mg daily.   This is not therapeutic dosing but would be the initial dose we would attempt. I also suggest he try taking MCT Oil. I will see him for Botox injections and medication management.       [address] [facility], [address]  Diplomate KAVYAN

## 2018-07-13 ENCOUNTER — TELEPHONE (OUTPATIENT)
Dept: NEUROLOGY | Age: 48
End: 2018-07-13

## 2018-07-26 ENCOUNTER — OFFICE VISIT (OUTPATIENT)
Dept: NEUROLOGY | Age: 48
End: 2018-07-26

## 2018-07-26 VITALS
OXYGEN SATURATION: 98 % | WEIGHT: 227 LBS | SYSTOLIC BLOOD PRESSURE: 142 MMHG | DIASTOLIC BLOOD PRESSURE: 94 MMHG | RESPIRATION RATE: 18 BRPM | BODY MASS INDEX: 31.66 KG/M2 | HEART RATE: 83 BPM

## 2018-07-26 DIAGNOSIS — G43.719 INTRACTABLE CHRONIC MIGRAINE WITHOUT AURA AND WITHOUT STATUS MIGRAINOSUS: Primary | ICD-10-CM

## 2018-07-26 NOTE — PATIENT INSTRUCTIONS

## 2018-07-26 NOTE — MR AVS SNAPSHOT
[place] [address] [address] 
[phone] Patient: Joni Vazquez MRN: [id number] :[id number] Visit Information Date & Time Provider Department Dept. Phone Encounter #  
 2018  1:20 PM Roger Auguste, [facility] at [address] [place] [address] [id number] Your Appointments 2018  2:40 PM  
Follow Up with Roger Auguste DO [facility] at [facility] [address] Appt Note: work in ok per Dr. Lester Rivera [address] [address]  
[address] [facility] 298 [facility]  [address]  
  
    
 10/18/2018  1:40 PM  
New Patient with Josephine Hopper PsyD  [facility] ([address]) Appt Note: NP Chronic Migraines , Long Term Concussion , Balance Tremor , Memory Issues \"refuse first avail\" kdm 3/20/18 [place] [facility] [address] [address] [phone]  
  
   
 [facility]  
  
    
 12/3/2018  3:00 PM  
ESTABLISHED PATIENT with [id number] MD Bethanie Montez IV, TURNER, [address] [place] [address] ([address]) Appt Note: physical  
 [address] [facility] 7 [id number] [phone]  
  
   
 [address] [facility] 7 [id number] Upcoming Health Maintenance Date Due Pneumococcal 19-64 Medium Risk (1 of 1 - PPSV23) 1988 DTaP/Tdap/Td series (1 - Tdap) 2005 Influenza Age 5 to Adult 2018 Allergies as of 2018  Review Complete On: 2018 By: Roger Auguste DO Severity Noted Reaction Type Reactions Amitriptyline  2016    Other (comments) Mental slowing Gabapentin  2016    Other (comments) Weight gain Current Immunizations  Reviewed on 2013 Name Date Td 2005 Not reviewed this visit You Were Diagnosed With   
  
 Codes Comments Intractable chronic migraine without aura and without status migrainosus    -  Primary ICD-10-CM: Q83.238 ICD-9-CM: 346.71 Vitals BP Pulse Resp Weight(growth percentile) SpO2 BMI  
 (!) 142/94 83 18 227 lb (103 kg) 98% 31.66 kg/m2 Smoking Status Never Smoker BMI and BSA Data Body Mass Index Body Surface Area  
 31.66 kg/m 2 2.27 m 2 Preferred Pharmacy Pharmacy Name Phone [id number] [address] [phone] Your Updated Medication List  
  
   
This list is accurate as of 7/26/18  1:35 PM.  Always use your most recent med list. ADVIL PO Take  by mouth. albuterol 90 mcg/actuation inhaler Commonly known as:  PROVENTIL HFA, VENTOLIN HFA, PROAIR HFA Take 2 Puffs by inhalation every four (4) hours as needed. amLODIPine-benazepril 5-20 mg per capsule Commonly known as:  Margarite Jacky Take 1 Cap by mouth daily. aspirin-acetaminophen-caffeine 250-250-65 mg per tablet Commonly known as:  EXCEDRIN ES Take 1 Tab by mouth. BENADRYL PO Take  by mouth. diclofenac EC 75 mg EC tablet Commonly known as:  VOLTAREN  
TAKE 1 TABLET (75 MG TOTAL) BY MOUTH 2 (TWO) TIMES A DAY. fluticasone 50 mcg/actuation nasal spray Commonly known as:  FLONASE  
1 [place] by Both Nostrils route two (2) times a day. magnesium oxide 400 mg tablet Commonly known as:  MAG-OX Take 1 Tab by mouth nightly. melatonin Tab tablet Take 5 mg by mouth nightly. memantine 5 mg tablet Commonly known as:  Joel Malady Take 1 Tab by mouth daily. onabotulinumtoxinA 200 unit injection Commonly known as:  BOTOX Inject selected muscles head and neck bilaterally every 12 weeks  Indications: MIGRAINE PREVENTION  
  
 SINGULAIR 10 mg tablet Generic drug:  montelukast  
Take 10 mg by mouth daily. venlafaxine- mg capsule Commonly known as:  EFFEXOR-XR  
TAKE 1 CAPSULE DAILY ZyrTEC 10 mg Cap Generic drug:  Cetirizine Take  by mouth. We Performed the Following [address] [id number] CPT(R)] Patient Instructions A Healthy Lifestyle: Care Instructions Your Care Instructions A healthy lifestyle can help you feel good, stay at a healthy weight, and have plenty of energy for both work and play. A healthy lifestyle is something you can share with your whole family. A healthy lifestyle also can lower your risk for serious health problems, such as high blood pressure, heart disease, and diabetes. You can follow a few steps listed below to improve your health and the health of your family. Follow-up care is a key part of your treatment and safety. Be sure to make and go to all appointments, and call your doctor if you are having problems. It's also a good idea to know your test results and keep a list of the medicines you take. How can you care for yourself at home? · Do not eat too much sugar, fat, or fast foods. You can still have dessert and treats now and then. The goal is moderation. · Start small to improve your eating habits. Pay attention to portion sizes, drink less juice and soda pop, and eat more fruits and vegetables. ¨ Eat a healthy amount of food. A 3-ounce serving of meat, for example, is about the size of a deck of cards. Fill the rest of your plate with vegetables and whole grains. ¨ Limit the amount of soda and sports drinks you have every day. Drink more water when you are thirsty. ¨ Eat at least 5 servings of fruits and vegetables every day. It may seem like a lot, but it is not hard to reach this goal. A serving or helping is 1 piece of fruit, 1 cup of vegetables, or 2 cups of leafy, raw vegetables.  Have an apple or some carrot sticks as an afternoon snack instead of a candy bar. Try to have fruits and/or vegetables at every meal. 
· Make exercise part of your daily routine. You may want to start with simple activities, such as walking, bicycling, or slow swimming. Try to be active 30 to 60 minutes every day. You do not need to do all 30 to 60 minutes all at once. For example, you can exercise 3 times a day for 10 or 20 minutes. Moderate exercise is safe for most people, but it is always a good idea to talk to your doctor before starting an exercise program. 
· Keep moving. Tad Mina the lawn, work in the garden, or [employer]. Take the stairs instead of the elevator at work. · If you smoke, quit. People who smoke have an increased risk for heart attack, stroke, cancer, and other lung illnesses. Quitting is hard, but there are ways to boost your chance of quitting tobacco for good. ¨ Use nicotine gum, patches, or lozenges. ¨ Ask your doctor about stop-smoking programs and medicines. ¨ Keep trying. In addition to reducing your risk of diseases in the future, you will notice some benefits soon after you stop using tobacco. If you have shortness of breath or asthma symptoms, they will likely get better within a few weeks after you quit. · Limit how much alcohol you drink. Moderate amounts of alcohol (up to 2 drinks a day for men, 1 drink a day for women) are okay. But drinking too much can lead to liver problems, high blood pressure, and other health problems. Family health If you have a family, there are many things you can do together to improve your health. · Eat meals together as a family as often as possible. · Eat healthy foods. This includes fruits, vegetables, lean meats and dairy, and whole grains. · Include your family in your fitness plan. Most people think of activities such as jogging or tennis as the way to fitness, but there are many ways you and your family can be more active.  Anything that makes you breathe hard and gets your heart pumping is exercise. Here are some tips: 
¨ Walk to do errands or to take your child to school or the bus. ¨ Go for a family bike ride after dinner instead of watching TV. Where can you learn more? Go to http://casey-zachariah.info/. Enter U341 in the search box to learn more about \"A Healthy Lifestyle: Care Instructions. \" Current as of: December 7, 2017 Content Version: 11.7 © 2025-7849 [employer]. Care instructions adapted under license by [employer] (which disclaims liability or warranty for this information). If you have questions about a medical condition or this instruction, always ask your healthcare professional. [address] any warranty or liability for your use of this information. Introducing [facility]! Angelique Toledo introduces EDUonGo patient portal. Now you can access parts of your medical record, email your doctor's office, and request medication refills online. 1. In your internet browser, go to [email or web address] 2. Click on the First Time User? Click Here link in the Sign In box. You will see the New Member Sign Up page. 3. Enter your EDUonGo Access Code exactly as it appears below. You will not need to use this code after youve completed the sign-up process. If you do not sign up before the expiration date, you must request a new code. · EDUonGo Access Code: [email or web address] Expires: 9/16/2018 11:19 AM 
 
4. Enter the last four digits of your Social Security Number (xxxx) and Date of Birth (mm/dd/yyyy) as indicated and click Submit. You will be taken to the next sign-up page. 5. Create a EDUonGo ID. This will be your EDUonGo login ID and cannot be changed, so think of one that is secure and easy to remember. 6. Create a [employer] password. You can change your password at any time. 7. Enter your Password Reset Question and Answer. This can be used at a later time if you forget your password. 8. Enter your e-mail address. You will receive e-mail notification when new information is available in [address] 9. Click Sign Up. You can now view and download portions of your medical record. 10. Click the Download Summary menu link to download a portable copy of your medical information. If you have questions, please visit the Frequently Asked Questions section of the [employer] website. Remember, [employer] is NOT to be used for urgent needs. For medical emergencies, dial 911. Now available from your iPhone and Android! Please provide this summary of care documentation to your next provider. Your primary care clinician is listed as [address] [address] [facility] IV. If you have any questions after today's visit, please call [phone].

## 2018-07-26 NOTE — PROGRESS NOTES
Botox Injection Note     2018     Patient:  Tete Ochoa   YOB: 1970  Date of Visit: 2018      Indication: patient has chronic migraine headaches greater than 15 days per month lasting more than 4 hours each. He has failed or not tolerated 2 or more medication preventatives. Written consent was signed and verified by me. Risks and benefits were discussed to include possible cosmetic asymmetry which is not permament or life threatening. Patient name and  was confirmed prior to procedure. Time out performed. Procedure:   Botox concentration: 200 units in 2 ml of preservative-free normal saline. 1:1 dilution. LOT#: [id number]  EXP:  2021    Injections and distribution as follow :      Units/site  Sites Loc Subtotal    procerus 5 1 ML 5   corrugaters 2.5 2 BL 5   frontalis 5 8 BL 40   Temporalis 10 4 BL 40   Occipitalis 10 2 BL 20   Cervical paraspinals 5 4 BL 20   Trapezius 10 4 BL 40         200 units Botox were reconstituted, 170 units injected as above and the remainder was unavoidably wasted. Patient tolerated procedure well. Return in 3 months for repeat injections.     _____________________________   Lisa Rios DO  [address], ABPN

## 2018-08-03 ENCOUNTER — TELEPHONE (OUTPATIENT)
Dept: NEUROLOGY | Age: 48
End: 2018-08-03

## 2018-08-06 ENCOUNTER — TELEPHONE (OUTPATIENT)
Dept: NEUROLOGY | Age: 48
End: 2018-08-06

## 2018-08-06 NOTE — TELEPHONE ENCOUNTER
PT received a letter that [facility] is denying pt's botox because they don't think it's working. Pt called them and said it was working and they suggested Dr. Wayne Sharpe call their medical management department before an appeal is done.  Please call [phone]

## 2018-08-07 NOTE — TELEPHONE ENCOUNTER
Re: Botox    Pt has approval on file from 2/20/18 - 3/23/19 for . No [place] required for [id number]. S/w CJ @ [facility] who informed me that the claim for Botox procedure on 7/26/18 was denied. Billing needs to submit a copy of pt's complete medical records for claim to be reviewed. As far as [facility] denying Botox b/c it isn't working for the pt, I think that is because each of the Botox procedure notes since April state that the pt has \"chronic migraine headaches greater than 15 days per month lasting more than 4 hours each. \"  The procedure notes do not document the improvement the pt is having with the injections. Billing submits the injection notes when they submit the claims for review, so if the notes don't state anything about improvement then [facility] is lead to believe that the injections are not working. I would advise Dr. Rossana Rivera to call the # left by the pt, [phone] [facility] [id number] line), and discuss the improvement the pt has had with Botox. Forward to nurse.

## 2018-08-07 NOTE — TELEPHONE ENCOUNTER
Please have Dr. Kobe Dove call the [id number] line and discuss the Botox determination with [facility]. Both office visit and procedure notes need to document the pt's improvement with Botox. (Routing comment)      Another peer to peer. Do you want to try to do this before you leave on Thursday?

## 2018-08-09 NOTE — TELEPHONE ENCOUNTER
Per KK I spoke with Anjana Garcia from [facility] who does our coding. She will print of the necessary notes and submit them to the insurance company for his July bill.

## 2018-08-21 ENCOUNTER — OFFICE VISIT (OUTPATIENT)
Dept: NEUROLOGY | Age: 48
End: 2018-08-21

## 2018-08-21 VITALS
DIASTOLIC BLOOD PRESSURE: 80 MMHG | WEIGHT: 226.8 LBS | RESPIRATION RATE: 16 BRPM | OXYGEN SATURATION: 98 % | HEART RATE: 110 BPM | SYSTOLIC BLOOD PRESSURE: 122 MMHG | HEIGHT: 71 IN | BODY MASS INDEX: 31.75 KG/M2

## 2018-08-21 DIAGNOSIS — G43.709 CHRONIC MIGRAINE W/O AURA W/O STATUS MIGRAINOSUS, NOT INTRACTABLE: Primary | ICD-10-CM

## 2018-08-21 DIAGNOSIS — F07.81 POSTCONCUSSION SYNDROME: ICD-10-CM

## 2018-08-21 DIAGNOSIS — Z87.820 HISTORY OF MULTIPLE CONCUSSIONS: ICD-10-CM

## 2018-08-21 NOTE — PROGRESS NOTES
No chief complaint on file. HPI    79-year-old gentleman here to follow-up. He had Botox injections on July 26 with good results. He has had less than 7 severe breakthrough migraines as he typically does. He has responded very well. No emergency room or urgent care visits. He is also here to discuss the neuropsychological testing done by Dr. Juana Marlow. I reviewed the results in detail. Overall, the summary is that he has no evidence of cognitive disorder or memory disorder. He scored superior ranges regarding problem solving and cognitive flexibility. He continues to have frequent mild headaches but not as severe after having Botox. He has not had any additional head injuries like he did this past July striking his head in his home. 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 Stress may be increasing. He is interested in joining the [employer] trial for CTE. We began low-dose memantine last visit. He does not feel any improvement but does not feel any worse. He also admits to not having a lot of stress right now. Background history:  ISADORA scan negative for Parkinson's   multiple migraine medications to include amitriptyline, gabapentin, verapamil, propranolol, Effexor, memantine, Botox          Review of Systems   Eyes: Negative for double vision. Musculoskeletal: Positive for joint pain. Neurological: Positive for tremors and headaches. Psychiatric/Behavioral: Positive for memory loss. The patient is nervous/anxious. All other systems reviewed and are negative.       Past Medical History:   Diagnosis Date    Allergic rhinitis 4/8/2012    Asthma 4/8/2012    Asthma     Cancer ([facility] [address]     skin ca exision from scalp w/ subsequent  local numbness     HX OTHER MEDICAL     rt shoulder posterior instability     Hypertension     Migraine 4/8/2012    Sciatica     Sinus headache 4/8/2012    Tension headache 4/8/2012    Trauma     nasal fracture, 3 concussions, soft ball eye injury     Trauma 06/2016    concussion . dislocated jaw . eval'd by ENT . Dr. Renata Lee and Dentist      Family History   Problem Relation Age of Onset    Diabetes Mother     Thyroid Disease Mother     Diabetes Father     Thyroid Disease Sister     Parkinsonism Maternal Uncle     Dementia Paternal Grandfather      Social History     Social History    Marital status:      Spouse name: N/A    Number of children: N/A    Years of education: N/A     Occupational History    Not on file. Social History Main Topics    Smoking status: Never Smoker    Smokeless tobacco: Never Used    Alcohol use Yes      Comment: 1-3 drinks a day of wine a beer    Drug use: No    Sexual activity: Not on file     Other Topics Concern    Not on file     Social History Narrative     Allergies   Allergen Reactions    Amitriptyline Other (comments)     Mental slowing     Gabapentin Other (comments)     Weight gain          Current Outpatient Prescriptions   Medication Sig    amLODIPine-benazepril (LOTREL) 5-20 mg per capsule Take 1 Cap by mouth daily.  memantine (NAMENDA) 5 mg tablet Take 1 Tab by mouth daily.  onabotulinumtoxinA (BOTOX) 200 unit injection Inject selected muscles head and neck bilaterally every 12 weeks  Indications: MIGRAINE PREVENTION    venlafaxine-SR (EFFEXOR-XR) 150 mg capsule TAKE 1 CAPSULE DAILY    diclofenac EC (VOLTAREN) 75 mg EC tablet TAKE 1 TABLET (75 MG TOTAL) BY MOUTH 2 (TWO) TIMES A DAY.  melatonin tab tablet Take 5 mg by mouth nightly.  IBUPROFEN (ADVIL PO) Take  by mouth.  aspirin-acetaminophen-caffeine (EXCEDRIN ES) 250-250-65 mg per tablet Take 1 Tab by mouth.  DIPHENHYDRAMINE HCL (BENADRYL PO) Take  by mouth.  magnesium oxide (MAG-OX) 400 mg tablet Take 1 Tab by mouth nightly.  fluticasone (FLONASE) 50 mcg/actuation nasal spray 1 [place] by Both Nostrils route two (2) times a day.  montelukast (SINGULAIR) 10 mg tablet Take 10 mg by mouth daily.       Cetirizine (ZYRTEC) 10 mg Cap Take  by mouth.  albuterol (PROVENTIL HFA, VENTOLIN HFA) 90 mcg/actuation inhaler Take 2 Puffs by inhalation every four (4) hours as needed. No current facility-administered medications for this visit. Neurologic Exam     Mental Status        WD/WN adult in NAD, normal grooming  VSS  A&O x 3    PERRL, nonicteric  Face is symmetric, tongue midline  Speech is fluent and clear  No limb ataxia. Right arm tremor that fluctuates in severity, amplitude, frequency and sometimes resolves spontaneously  Moving all extemities spontaneously and symmetric  Normal gait    CVS RRR  Lungs nonlabored  Skin is warm and dry         Visit Vitals    /80    Pulse (!) 110    Resp 16    Ht 5' 11\" (1.803 m)    Wt 102.9 kg (226 lb 12.8 oz)    SpO2 98%    BMI 31.63 kg/m2       Assessment and Plan   Diagnoses and all orders for this visit:    1. Chronic migraine w/o aura w/o status migrainosus, not intractable    2. Postconcussion syndrome    3. History of multiple concussions      19-WCJF-YAL woman with complicated neurologic history who has a history of multiple concussions. He has chronic posttraumatic concussive headache intermixed with chronic migraines. The chronic migraines seem well controlled with Botox injections as he has not had severe breakthrough migraines consistent with what he had previously. I would recommend we continue migraine headaches. He has not had more than 7 severe breakthrough events of previous severe migraines. I went over the neuropsychological testing with him. Overall this was a reassuring assessment. There is no evidence of dementia or pathologic cognitive disorder. He in fact scored in superior ranges. Report stated that he has a lot of distractions from his physical complaints. At this point I would not change any medications. I had like to see how he does as the school year starts.   He understands that stress is a huge trigger for his symptoms. He is going to listen to his body and make changes accordingly. Agree if he decides to pursue the [facility] CTR trial.  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 Continue memantine. Let us see if there is any benefit when school starts. He is due for injections the end of October beginning in November. I recommend we continue. More than 30 minutes of time was spent face-to-face with the patient discussing his diagnoses, neuropsych testing, treatment plan, prognosis moving forward.       [address] [place] [address], [facility] [address]  Diplomate ABPN

## 2018-08-21 NOTE — PROGRESS NOTES
Mr. Jermaine Ontiveros is here to follow up concussion, headaches and neuropsych testing. Depression screening done on patient.

## 2018-08-21 NOTE — MR AVS SNAPSHOT
[facility] [address] [facility] Óscar Acevedo 13 
[phone] Patient: Wild Hartman MRN: [id number] :[id number] Visit Information Date & Time Provider Department Dept. Phone Encounter #  
 2018 12:30 PM [facility] at [address] [place] [address] [id number] Follow-up Instructions Return in about 3 months (around 2018). Routing History Your Appointments 10/18/2018  1:40 PM  
New Patient with Black Ramires PsyD [facility] ([facility]) Appt Note: NP Chronic Migraines , Long Term Concussion , Balance Tremor , Memory Issues \"refuse first avail\" kdm 3/20/18 [place] [facility] [address] [facility]ly [facility] 99 [id number] [phone]  
  
   
 [facility] [place]  
  
    
 2018  8:00 AM  
PROCEDURE with DO [facility] Appt Note: botox inj / EAH; botox inj / [address] [place] [address]  
[phone]  
  
   
 [address] [place] [id number]  
  
    
 12/3/2018  3:00 PM  
ESTABLISHED PATIENT with MD Jordan Lott IV, TURNER, [address] [facility] ([facility]) Appt Note: physical  
 [address] [facility] 7 [address]  
[phone]  
  
   
 [address] [facility] 7 [address] Upcoming Health Maintenance Date Due Pneumococcal 19-64 Medium Risk (1 of 1 - PPSV23) 1988 DTaP/Tdap/Td series (1 - Tdap) 2005 Influenza Age 5 to Adult 2018 Allergies as of 2018  Review Complete On: 2018 By: Stas Aaron LPN Severity Noted Reaction Type Reactions Amitriptyline  2016    Other (comments) Mental slowing Gabapentin  2016    Other (comments) Weight gain Current Immunizations  Reviewed on 11/4/2013 Name Date Td 5/20/2005 Not reviewed this visit You Were Diagnosed With   
  
 Codes Comments Chronic migraine w/o aura w/o status migrainosus, not intractable    -  Primary ICD-10-CM: E11.890 ICD-9-CM: 346.70 Postconcussion syndrome     ICD-10-CM: F07.81 ICD-9-CM: 310.2 History of multiple concussions     ICD-10-CM: Z87.820 ICD-9-CM: V15.52 Vitals BP Pulse Resp Height(growth percentile) Weight(growth percentile) SpO2  
 122/80 (!) 110 16 5' 11\" (1.803 m) 226 lb 12.8 oz (102.9 kg) 98% BMI Smoking Status 31.63 kg/m2 Never Smoker Vitals History BMI and BSA Data Body Mass Index Body Surface Area  
 31.63 kg/m 2 2.27 m 2 Preferred Pharmacy Pharmacy Name Phone [id number] [address] [phone] Your Updated Medication List  
  
   
This list is accurate as of 8/21/18 12:59 PM.  Always use your most recent med list. ADVIL PO Take  by mouth. albuterol 90 mcg/actuation inhaler Commonly known as:  PROVENTIL HFA, VENTOLIN HFA, PROAIR HFA Take 2 Puffs by inhalation every four (4) hours as needed. amLODIPine-benazepril 5-20 mg per capsule Commonly known as:  Urbano Holster Take 1 Cap by mouth daily. aspirin-acetaminophen-caffeine 250-250-65 mg per tablet Commonly known as:  EXCEDRIN ES Take 1 Tab by mouth. BENADRYL PO Take  by mouth. diclofenac EC 75 mg EC tablet Commonly known as:  VOLTAREN  
TAKE 1 TABLET (75 MG TOTAL) BY MOUTH 2 (TWO) TIMES A DAY. fluticasone 50 mcg/actuation nasal spray Commonly known as:  FLONASE  
1 [place] by Both Nostrils route two (2) times a day. magnesium oxide 400 mg tablet Commonly known as:  MAG-OX Take 1 Tab by mouth nightly. melatonin Tab tablet Take 5 mg by mouth nightly. memantine 5 mg tablet Commonly known as:  [place] Take 1 Tab by mouth daily. onabotulinumtoxinA 200 unit injection Commonly known as:  BOTOX Inject selected muscles head and neck bilaterally every 12 weeks  Indications: MIGRAINE PREVENTION  
  
 SINGULAIR 10 mg tablet Generic drug:  montelukast  
Take 10 mg by mouth daily. venlafaxine- mg capsule Commonly known as:  EFFEXOR-XR  
TAKE 1 CAPSULE DAILY ZyrTEC 10 mg Cap Generic drug:  Cetirizine Take  by mouth. Follow-up Instructions Return in about 3 months (around 11/26/2018). Patient Instructions A Healthy Lifestyle: Care Instructions Your Care Instructions A healthy lifestyle can help you feel good, stay at a healthy weight, and have plenty of energy for both work and play. A healthy lifestyle is something you can share with your whole family. A healthy lifestyle also can lower your risk for serious health problems, such as high blood pressure, heart disease, and diabetes. You can follow a few steps listed below to improve your health and the health of your family. Follow-up care is a key part of your treatment and safety. Be sure to make and go to all appointments, and call your doctor if you are having problems. It's also a good idea to know your test results and keep a list of the medicines you take. How can you care for yourself at home? · Do not eat too much sugar, fat, or fast foods. You can still have dessert and treats now and then. The goal is moderation. · Start small to improve your eating habits. Pay attention to portion sizes, drink less juice and soda pop, and eat more fruits and vegetables. ¨ Eat a healthy amount of food. A 3-ounce serving of meat, for example, is about the size of a deck of cards. Fill the rest of your plate with vegetables and whole grains. ¨ Limit the amount of soda and sports drinks you have every day. Drink more water when you are thirsty. ¨ Eat at least 5 servings of fruits and vegetables every day. It may seem like a lot, but it is not hard to reach this goal. A serving or helping is 1 piece of fruit, 1 cup of vegetables, or 2 cups of leafy, raw vegetables. Have an apple or some carrot sticks as an afternoon snack instead of a candy bar. Try to have fruits and/or vegetables at every meal. 
· Make exercise part of your daily routine. You may want to start with simple activities, such as walking, bicycling, or slow swimming. Try to be active 30 to 60 minutes every day. You do not need to do all 30 to 60 minutes all at once. For example, you can exercise 3 times a day for 10 or 20 minutes. Moderate exercise is safe for most people, but it is always a good idea to talk to your doctor before starting an exercise program. 
· Keep moving. Alessandra Baez the lawn, work in the garden, or Who What Wear. Take the stairs instead of the elevator at work. · If you smoke, quit. People who smoke have an increased risk for heart attack, stroke, cancer, and other lung illnesses. Quitting is hard, but there are ways to boost your chance of quitting tobacco for good. ¨ Use nicotine gum, patches, or lozenges. ¨ Ask your doctor about stop-smoking programs and medicines. ¨ Keep trying. In addition to reducing your risk of diseases in the future, you will notice some benefits soon after you stop using tobacco. If you have shortness of breath or asthma symptoms, they will likely get better within a few weeks after you quit. · Limit how much alcohol you drink. Moderate amounts of alcohol (up to 2 drinks a day for men, 1 drink a day for women) are okay. But drinking too much can lead to liver problems, high blood pressure, and other health problems. Family health If you have a family, there are many things you can do together to improve your health. · Eat meals together as a family as often as possible. · Eat healthy foods.  This includes fruits, vegetables, lean meats and dairy, and whole grains. · Include your family in your fitness plan. Most people think of activities such as jogging or tennis as the way to fitness, but there are many ways you and your family can be more active. Anything that makes you breathe hard and gets your heart pumping is exercise. Here are some tips: 
¨ Walk to do errands or to take your child to school or the bus. ¨ Go for a family bike ride after dinner instead of watching TV. Where can you learn more? Go to http://casey-zachariah.info/. Enter J675 in the search box to learn more about \"A Healthy Lifestyle: Care Instructions. \" Current as of: December 7, 2017 Content Version: 11.7 © 2330-0471 "[employer]. Care instructions adapted under license by [employer] (which disclaims liability or warranty for this information). If you have questions about a medical condition or this instruction, always ask your healthcare professional. [place] [address] any warranty or liability for your use of this information. Introducing [facility]! [employer] introduces [employer] patient portal. Now you can access parts of your medical record, email your doctor's office, and request medication refills online. 1. In your internet browser, go to [email or web address] 2. Click on the First Time User? Click Here link in the Sign In box. You will see the New Member Sign Up page. 3. Enter your [employer] Access Code exactly as it appears below. You will not need to use this code after youve completed the sign-up process. If you do not sign up before the expiration date, you must request a new code. · [employer] Access Code: [email or web address] Expires: 9/16/2018 11:19 AM 
 
4. Enter the last four digits of your Social Security Number (xxxx) and Date of Birth (mm/dd/yyyy) as indicated and click Submit. You will be taken to the next sign-up page. 5. Create a [employer] ID. This will be your [employer] login ID and cannot be changed, so think of one that is secure and easy to remember. 6. Create a [employer] password. You can change your password at any time. 7. Enter your Password Reset Question and Answer. This can be used at a later time if you forget your password. 8. Enter your e-mail address. You will receive e-mail notification when new information is available in [address] 9. Click Sign Up. You can now view and download portions of your medical record. 10. Click the Download Summary menu link to download a portable copy of your medical information. If you have questions, please visit the Frequently Asked Questions section of the [employer] website. Remember, [employer] is NOT to be used for urgent needs. For medical emergencies, dial 911. Now available from your iPhone and Android! Please provide this summary of care documentation to your next provider. Your primary care clinician is listed as Shantelle Jorge IV. If you have any questions after today's visit, please call [phone].

## 2018-08-21 NOTE — PATIENT INSTRUCTIONS

## 2018-08-27 NOTE — TELEPHONE ENCOUNTER
Insurance requiring 90 day script and needs to be sent to [facility]. Med is preloaded. LOV  8/21/18.     This can wait til Dr Kasie Vargas return

## 2018-09-05 RX ORDER — MEMANTINE HYDROCHLORIDE 5 MG/1
5 TABLET ORAL DAILY
Qty: 90 TAB | Refills: 0 | Status: SHIPPED | OUTPATIENT
Start: 2018-09-05 | End: 2018-11-16 | Stop reason: SDUPTHER

## 2018-10-10 ENCOUNTER — DOCUMENTATION ONLY (OUTPATIENT)
Dept: NEUROLOGY | Age: 48
End: 2018-10-10

## 2018-11-01 ENCOUNTER — OFFICE VISIT (OUTPATIENT)
Dept: NEUROLOGY | Age: 48
End: 2018-11-01

## 2018-11-01 VITALS
HEART RATE: 100 BPM | RESPIRATION RATE: 16 BRPM | WEIGHT: 229 LBS | BODY MASS INDEX: 32.06 KG/M2 | DIASTOLIC BLOOD PRESSURE: 100 MMHG | SYSTOLIC BLOOD PRESSURE: 146 MMHG | HEIGHT: 71 IN | OXYGEN SATURATION: 96 %

## 2018-11-01 DIAGNOSIS — G43.719 INTRACTABLE CHRONIC MIGRAINE WITHOUT AURA AND WITHOUT STATUS MIGRAINOSUS: Primary | ICD-10-CM

## 2018-11-01 NOTE — PROGRESS NOTES
Botox Injection Note  
 
2018 Patient:  Gina Santacruz YOB: 1970 Date of Visit: 2018 Indication: patient has chronic migraine headaches greater than 15 days per month lasting more than 4 hours each. She has failed or not tolerated 2 or more medication preventatives. Written consent was signed and verified by me. Risks and benefits were discussed to include possible cosmetic asymmetry which is not permament or life threatening. Patient name and  was confirmed prior to procedure. Time out performed. Procedure:  
Botox concentration: 200 units in 2 ml of preservative-free normal saline. 1:1 dilution. LOT#: [id number] EXP:  2021 Injections and distribution as follow :  
 
 Units/site  Sites Loc Subtotal   
procerus 5 1 ML 5  
corrugaters 2.5 2 BL 5  
frontalis 5 8 BL 40 Temporalis 10 4 BL 40 Occipitalis 10 2 BL 20 Cervical paraspinals 5 4 BL 20 Trapezius 10 4 BL 40  
 
   
200 units Botox were reconstituted, 170 units injected as above and the remainder was unavoidably wasted. Patient tolerated procedure well. Return in 3 months for repeat injections. _____________________________ Kelsie Larger, DO 
NEUROLOGIST Mykel Viera

## 2018-11-01 NOTE — PROGRESS NOTES
Pt here for botox injection. Pt states having daily headaches still but not as severe. Bumped his head 3 times on October 6/7. Will start dry needling soon. Depression screen completed.
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
## 2018-11-16 RX ORDER — MEMANTINE HYDROCHLORIDE 5 MG/1
TABLET ORAL
Qty: 90 TAB | Refills: 0 | Status: SHIPPED | OUTPATIENT
Start: 2018-11-16 | End: 2018-11-28 | Stop reason: ALTCHOICE

## 2018-11-27 RX ORDER — VENLAFAXINE HYDROCHLORIDE 150 MG/1
CAPSULE, EXTENDED RELEASE ORAL
Qty: 90 CAP | Refills: 1 | Status: SHIPPED | OUTPATIENT
Start: 2018-11-27 | End: 2019-05-26 | Stop reason: SDUPTHER

## 2018-11-28 ENCOUNTER — OFFICE VISIT (OUTPATIENT)
Dept: NEUROLOGY | Age: 48
End: 2018-11-28

## 2018-11-28 VITALS
OXYGEN SATURATION: 98 % | WEIGHT: 229 LBS | DIASTOLIC BLOOD PRESSURE: 78 MMHG | RESPIRATION RATE: 16 BRPM | BODY MASS INDEX: 32.06 KG/M2 | HEART RATE: 94 BPM | SYSTOLIC BLOOD PRESSURE: 110 MMHG | HEIGHT: 71 IN

## 2018-11-28 DIAGNOSIS — Z87.820 HISTORY OF MULTIPLE CONCUSSIONS: ICD-10-CM

## 2018-11-28 DIAGNOSIS — F07.81 POSTCONCUSSION SYNDROME: ICD-10-CM

## 2018-11-28 DIAGNOSIS — G25.2 TREMOR, COARSE: ICD-10-CM

## 2018-11-28 DIAGNOSIS — G43.719 INTRACTABLE CHRONIC MIGRAINE WITHOUT AURA AND WITHOUT STATUS MIGRAINOSUS: Primary | ICD-10-CM

## 2018-11-28 RX ORDER — MEMANTINE HYDROCHLORIDE 7 MG/1
7 CAPSULE, EXTENDED RELEASE ORAL DAILY
Qty: 90 CAP | Refills: 0 | Status: SHIPPED | OUTPATIENT
Start: 2018-11-28 | End: 2019-02-08 | Stop reason: SDUPTHER

## 2018-11-28 NOTE — PROGRESS NOTES
Chief Complaint Patient presents with  
 Headache  Concussion HPI 
50-year-old gentleman here for follow-up. I follow him for his history of concussions with multiple subconcussive events, functional tremor, chronic migraine, anxiety. He had Botox injections on November 1st w/ his usual good results. He still gets mild daily headache but he has not had any severe debeilitating breakthrough migraines like previously. 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 His mornings to take longer to get ready such that he is a little slower to be his usual self. He has more hesitation in his speech. Pauses when he talks. He needs coffee every morning and again at lunchtime. At the end of the day when he is tired he reverts back to his morning self. Effexor is helpful with his anxiety and keeping his mood stable. He has not had any more sub-concussive events but he does remember one serious concussion about 8 years ago while playing softball. Apparently a softball hit his right temple and he fell to the ground and reportedly was unconscious momentarily. He is still doing dryneedling into his neck. He still goes a concussion therapy once a week. Background history: ISADORA scan negative for Parkinson's 
multiple migraine medications to include amitriptyline, gabapentin, verapamil, propranolol, Effexor, memantine, BotoxFormal neuropsychological testing without evidence of dementia or cognitive impairment, 2018 MRI brain and cervical spine both within normal limits Review of Systems Eyes: Negative for double vision. Neurological: Positive for tremors, weakness and headaches. Negative for loss of consciousness. Psychiatric/Behavioral: Positive for memory loss. The patient is nervous/anxious. All other systems reviewed and are negative. Past Medical History:  
Diagnosis Date  Allergic rhinitis 4/8/2012  Asthma 4/8/2012  Asthma  Cancer ([facility] [address] skin ca exision from scalp w/ subsequent  local numbness  HX OTHER MEDICAL   
 rt shoulder posterior instability  Hypertension  Migraine 4/8/2012  Sciatica  Sinus headache 4/8/2012  Tension headache 4/8/2012  Trauma   
 nasal fracture, 3 concussions, soft ball eye injury  Trauma 06/2016  
 concussion . dislocated jaw . eval'd by ENT . Dr. Espinoza Lover and Dentist   
 
Family History Problem Relation Age of Onset  Diabetes Mother  Thyroid Disease Mother  Diabetes Father  Thyroid Disease Sister  Parkinsonism Maternal Uncle  Dementia Paternal Grandfather Social History Socioeconomic History  Marital status:  Spouse name: Not on file  Number of children: Not on file  Years of education: Not on file  Highest education level: Not on file Social Needs  Financial resource strain: Not on file  Food insecurity - worry: Not on file  Food insecurity - inability: Not on file  Transportation needs - medical: Not on file  Transportation needs - non-medical: Not on file Occupational History  Not on file Tobacco Use  Smoking status: Never Smoker  Smokeless tobacco: Never Used Substance and Sexual Activity  Alcohol use: Yes Comment: 1-3 drinks a day of wine a beer  Drug use: No  
 Sexual activity: Not on file Other Topics Concern  Not on file Social History Narrative  Not on file Allergies Allergen Reactions  Amitriptyline Other (comments) Mental slowing  Gabapentin Other (comments) Weight gain Current Outpatient Medications Medication Sig  
 memantine ER (NAMENDA XR) 7 mg capsule Take 1 Cap by mouth daily.  venlafaxine-SR (EFFEXOR-XR) 150 mg capsule TAKE 1 CAPSULE DAILY  amLODIPine-benazepril (LOTREL) 5-20 mg per capsule Take 1 Cap by mouth daily.   
 onabotulinumtoxinA (BOTOX) 200 unit injection Inject selected muscles head and neck bilaterally every 12 weeks  Indications: MIGRAINE PREVENTION  
 diclofenac EC (VOLTAREN) 75 mg EC tablet TAKE 1 TABLET (75 MG TOTAL) BY MOUTH 2 (TWO) TIMES A DAY.  melatonin tab tablet Take 5 mg by mouth nightly.  IBUPROFEN (ADVIL PO) Take  by mouth.  aspirin-acetaminophen-caffeine (EXCEDRIN ES) 250-250-65 mg per tablet Take 1 Tab by mouth.  DIPHENHYDRAMINE HCL (BENADRYL PO) Take  by mouth.  magnesium oxide (MAG-OX) 400 mg tablet Take 1 Tab by mouth nightly.  fluticasone (FLONASE) 50 mcg/actuation nasal spray 1 [place] by Both Nostrils route two (2) times a day.  montelukast (SINGULAIR) 10 mg tablet Take 10 mg by mouth daily.  Cetirizine (ZYRTEC) 10 mg Cap Take  by mouth.  albuterol (PROVENTIL HFA, VENTOLIN HFA) 90 mcg/actuation inhaler Take 2 Puffs by inhalation every four (4) hours as needed. No current facility-administered medications for this visit. Neurologic Exam  
 
Mental Status WD/WN adult in NAD, normal grooming VSS 
A&O x 3 PERRL, nonicteric Face is symmetric, tongue midline Speech is fluent and clear No limb ataxia. occ coarse RUE tremor, nonsustained Moving all extemities spontaneously and symmetric Normal gait CVS RRR Lungs nonlabored Skin is warm and dry Visit Vitals /78 Pulse 94 Resp 16 Ht 5' 11\" (1.803 m) Wt 103.9 kg (229 lb) SpO2 98% BMI 31.94 kg/m² Assessment and Plan Diagnoses and all orders for this visit: 
 
1. Intractable chronic migraine without aura and without status migrainosus 2. Postconcussion syndrome 3. History of multiple concussions 4. Tremor, coarse Other orders 
-     memantine ER (NAMENDA XR) 7 mg capsule; Take 1 Cap by mouth daily. 50-year-old gentleman with a long history of recurrent concussions so concussions who is overall clinically stable. He does recognize that he has a new baseline result history.   He has farmed to just live with these changes. He has implemented self-care as much as possible. 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 No change to his medications however I will switch his memantine to extended release formulation when he is due for his next refill. Continue Botox injections as he has had less than 7 severe debilitating breakthrough migraines. Continue with other medications. I will see him for his Botox appointments and follow-up thereafter. More than 25 minutes of face-to-face time was spent with the patient discussing his overall condition, diagnosis, workup, plan moving forward. I reviewed and decided to continue the current medications. [address] [facility],  
NEUROLOGIST Diplomate CRESCENCIO

## 2018-12-20 ENCOUNTER — TELEPHONE (OUTPATIENT)
Dept: NEUROLOGY | Age: 48
End: 2018-12-20

## 2018-12-20 NOTE — TELEPHONE ENCOUNTER
Re: Botox    Called and s/w Ellis Blackburn @ [place] to see if PA is needed for [id number]. Per Ellis Blackburn:    No PA or Pre-Determination required for [address]. Yessenia Holman still valid/approved. Auth good until 3/23/19. SPP is [facility]. Phone # is [phone]. Forward to nurse.

## 2019-01-03 ENCOUNTER — DOCUMENTATION ONLY (OUTPATIENT)
Dept: NEUROLOGY | Age: 49
End: 2019-01-03

## 2019-01-24 ENCOUNTER — TELEPHONE (OUTPATIENT)
Dept: NEUROLOGY | Age: 49
End: 2019-01-24

## 2019-01-24 ENCOUNTER — OFFICE VISIT (OUTPATIENT)
Dept: NEUROLOGY | Age: 49
End: 2019-01-24

## 2019-01-24 VITALS
HEIGHT: 71 IN | WEIGHT: 232 LBS | SYSTOLIC BLOOD PRESSURE: 122 MMHG | RESPIRATION RATE: 18 BRPM | HEART RATE: 100 BPM | DIASTOLIC BLOOD PRESSURE: 94 MMHG | OXYGEN SATURATION: 98 % | BODY MASS INDEX: 32.48 KG/M2

## 2019-01-24 DIAGNOSIS — G43.719 INTRACTABLE CHRONIC MIGRAINE WITHOUT AURA AND WITHOUT STATUS MIGRAINOSUS: Primary | ICD-10-CM

## 2019-01-24 NOTE — PROGRESS NOTES
Botox Injection Note 2019 Patient:  Ijeoma Torres YOB: 1970 Date of Visit: 2019 Indication: patient has chronic migraine headaches greater than 15 days per month lasting more than 4 hours each. She has failed or not tolerated 2 or more medication preventatives. Written consent was signed and verified by me. Risks and benefits were discussed to include possible cosmetic asymmetry which is not permament or life threatening. Patient name and  was confirmed prior to procedure. Time out performed. Procedure:  
Botox concentration: 200 units in 2 ml of preservative-free normal saline. 1:1 dilution. LOT#: [id number] EXP:  2021 Injections and distribution as follow :  
 
 Units/site  Sites Loc Subtotal   
procerus 5 1 ML 5  
corrugaters 2.5 2 BL 5  
frontalis 5 8 BL 40 Temporalis 10 4 BL 40 Occipitalis 10 2 BL 20 Cervical paraspinals 5 4 BL 20 Trapezius 10 4 BL 40  
 
   
200 units Botox were reconstituted, 170 units injected as above and the remainder was unavoidably wasted. Patient tolerated procedure well. Return in 3 months for repeat injections. _____________________________ Martinez Sanchez DO 
NEUROLOGIST Yohana Rivera

## 2019-02-06 ENCOUNTER — TELEPHONE (OUTPATIENT)
Dept: NEUROLOGY | Age: 49
End: 2019-02-06

## 2019-02-06 DIAGNOSIS — S06.0X0S CONCUSSION WITHOUT LOSS OF CONSCIOUSNESS, SEQUELA (HCC): Primary | ICD-10-CM

## 2019-02-06 NOTE — TELEPHONE ENCOUNTER
Pt needs new script for PT faxed to [facility]. He would also like a call back when it's done.

## 2019-02-09 PROBLEM — Z87.820 H/O MULTIPLE CONCUSSIONS: Status: ACTIVE | Noted: 2019-02-09

## 2019-02-09 PROBLEM — G43.719 INTRACTABLE CHRONIC MIGRAINE WITHOUT AURA: Status: ACTIVE | Noted: 2019-02-09

## 2019-02-09 PROBLEM — G25.2 TREMOR, COARSE: Status: ACTIVE | Noted: 2019-02-09

## 2019-02-09 PROBLEM — F07.81 POST CONCUSSION SYNDROME: Status: ACTIVE | Noted: 2019-02-09

## 2019-02-14 ENCOUNTER — TELEPHONE (OUTPATIENT)
Dept: NEUROLOGY | Age: 49
End: 2019-02-14

## 2019-02-14 NOTE — TELEPHONE ENCOUNTER
Called pt and asked that he call me back. What dated does he have for his next botox? May 2 or May 9?

## 2019-03-01 ENCOUNTER — TELEPHONE (OUTPATIENT)
Dept: NEUROLOGY | Age: 49
End: 2019-03-01

## 2019-03-01 NOTE — TELEPHONE ENCOUNTER
Calling re authorization of botox. They stated they need someone to call with authorization for botox within the next week or two.

## 2019-03-01 NOTE — TELEPHONE ENCOUNTER
Pt does not have pharmacy benefits through [employer] so this does not sound like it is for  (Botox med). Per Brayden Khoury, pt does not have Botox appt again until May. Called the # provided ([phone]) which takes me to [employer] main line, no specific department. Followed the prompts to get to pre-certifications and s/w Obi Garnica. I mentioned the phone call to Obi Garnica and she informed me that she cannot see any notes on the pt's account. Obi Garnica asked for the CPT code and Dx code for the procedure. Per Obi Garnica there is NO [place] needed for [id number]. A Pre-Determination is recommended but is considered voluntary not mandatory. Obi Garnica informed me that she would transfer me to a member  for further assistance since PA is not needed for a pre-certification. S/w Brenda Junior in member services they are calling about a claim for Botox that they are trying to get medical records for. I informed her that I will pass this message along to the correct person/dept.

## 2019-03-19 ENCOUNTER — OFFICE VISIT (OUTPATIENT)
Dept: NEUROLOGY | Age: 49
End: 2019-03-19

## 2019-03-19 VITALS
BODY MASS INDEX: 32.2 KG/M2 | DIASTOLIC BLOOD PRESSURE: 86 MMHG | HEART RATE: 99 BPM | WEIGHT: 230 LBS | HEIGHT: 71 IN | OXYGEN SATURATION: 97 % | SYSTOLIC BLOOD PRESSURE: 128 MMHG | RESPIRATION RATE: 18 BRPM

## 2019-03-19 DIAGNOSIS — R55 SYNCOPE AND COLLAPSE: Primary | ICD-10-CM

## 2019-03-19 DIAGNOSIS — G43.719 INTRACTABLE CHRONIC MIGRAINE WITHOUT AURA AND WITHOUT STATUS MIGRAINOSUS: ICD-10-CM

## 2019-03-19 DIAGNOSIS — Z87.820 HISTORY OF MULTIPLE CONCUSSIONS: ICD-10-CM

## 2019-03-19 DIAGNOSIS — F07.81 POSTCONCUSSION SYNDROME: ICD-10-CM

## 2019-03-19 DIAGNOSIS — S06.0X0S CONCUSSION WITHOUT LOSS OF CONSCIOUSNESS, SEQUELA (HCC): ICD-10-CM

## 2019-03-19 NOTE — PROGRESS NOTES
Chief Complaint Patient presents with  Neurologic Problem Concussions HPI Meena Roa is a 50-year-old gentleman here to follow-up sooner than expected. He has had some new developments medically. Since early February he is been struggling with upper respiratory illness and still slowly is recovering. He has had respiratory issues superimposed on pre-existing asthma. During this course he has had some severe coughing fits sometimes to the point where he loses vision temporarily and has tremulousness throughout his body. No loss of consciousness. Secondly, about a week ago he was lying in bed in the evening reading a book and then got up to use the restroom. He remembers getting up but then his next memory is being on the floor in the bathroom with severe head pain. He continues to have some mild tenderness in the left posterior scalp. No bleeding. This was unwitnessed. No tongue biting or incontinence. Since that event he is felt more concussed like when he has had acute injuries in the past. 
 
He is going to physical therapy diligently. He still has difficulty focusing his vision. He gets easily symptomatic with too much exertion. His headaches have had a fluctuating course. He has good improvement with Botox as the intensity is much less than it was before. He knows when the Botox is wearing off as the headaches tend to escalate into severe debilitating symptoms. He will still have more than 15 days of headache pain a month lasting more than 4 hours each but with Botox there is improvement in intensity. Tremors is still occur at different times. He has some good days intermixed with several bad days clinically. Memory is poor. He is thinking about donating his brain to [facility] as part of their CTE investigation. Background history: ISADORA scan negative for Parkinson's 
multiple migraine medications to include amitriptyline, gabapentin, verapamil, propranolol, Effexor, memantine, Botox, namenda Formal neuropsychological testing without evidence of dementia or cognitive impairment, 2018 MRI brain and cervical spine both within normal limits Review of Systems Constitutional: Positive for malaise/fatigue. HENT: Negative for hearing loss. Eyes: Positive for blurred vision. Respiratory: Positive for cough. Gastrointestinal: Negative for vomiting. Neurological: Positive for tremors, loss of consciousness and headaches. Psychiatric/Behavioral: Positive for memory loss. The patient is nervous/anxious. All other systems reviewed and are negative. Past Medical History:  
Diagnosis Date  Allergic rhinitis 4/8/2012  Asthma 4/8/2012  Asthma  Cancer ([facility] [address] skin ca exision from scalp w/ subsequent  local numbness  HX OTHER MEDICAL   
 rt shoulder posterior instability  Hypertension  Intractable chronic migraine without aura 2/9/2019 Meryl Remington  Migraine 4/8/2012  Post concussion syndrome 2/9/2019  Sciatica  Sinus headache 4/8/2012  Tension headache 4/8/2012  Trauma   
 nasal fracture, 3 concussions, soft ball eye injury  Trauma 06/2016  
 concussion . dislocated jaw . eval'd by ENT . Dr. Yogi Haynes and Dentist   
 Tremor, coarse 2/9/2019 Family History Problem Relation Age of Onset  Diabetes Mother  Thyroid Disease Mother  Diabetes Father  Thyroid Disease Sister  Parkinsonism Maternal Uncle  Dementia Paternal Grandfather Social History Socioeconomic History  Marital status:  Spouse name: Not on file  Number of children: Not on file  Years of education: Not on file  Highest education level: Not on file Social Needs  Financial resource strain: Not on file  Food insecurity - worry: Not on file  Food insecurity - inability: Not on file  Transportation needs - medical: Not on file  Transportation needs - non-medical: Not on file Occupational History  Not on file Tobacco Use  Smoking status: Never Smoker  Smokeless tobacco: Never Used Substance and Sexual Activity  Alcohol use: Yes Comment: 1-3 drinks a day of wine a beer  Drug use: No  
 Sexual activity: Not on file Other Topics Concern  Not on file Social History Narrative  Not on file Allergies Allergen Reactions  Amitriptyline Other (comments) Mental slowing  Gabapentin Other (comments) Weight gain Current Outpatient Medications Medication Sig  
 memantine ER (NAMENDA XR) 7 mg capsule TAKE 1 CAPSULE DAILY  venlafaxine-SR (EFFEXOR-XR) 150 mg capsule TAKE 1 CAPSULE DAILY  amLODIPine-benazepril (LOTREL) 5-20 mg per capsule Take 1 Cap by mouth daily.  onabotulinumtoxinA (BOTOX) 200 unit injection Inject selected muscles head and neck bilaterally every 12 weeks  Indications: MIGRAINE PREVENTION  
 diclofenac EC (VOLTAREN) 75 mg EC tablet TAKE 1 TABLET (75 MG TOTAL) BY MOUTH 2 (TWO) TIMES A DAY.  melatonin tab tablet Take 5 mg by mouth nightly.  IBUPROFEN (ADVIL PO) Take  by mouth.  aspirin-acetaminophen-caffeine (EXCEDRIN ES) 250-250-65 mg per tablet Take 1 Tab by mouth.  DIPHENHYDRAMINE HCL (BENADRYL PO) Take  by mouth.  magnesium oxide (MAG-OX) 400 mg tablet Take 1 Tab by mouth nightly.  fluticasone (FLONASE) 50 mcg/actuation nasal spray 1 Schlater by Both Nostrils route two (2) times a day.  montelukast (SINGULAIR) 10 mg tablet Take 10 mg by mouth daily.  Cetirizine (ZYRTEC) 10 mg Cap Take  by mouth.  albuterol (PROVENTIL HFA, VENTOLIN HFA) 90 mcg/actuation inhaler Take 2 Puffs by inhalation every four (4) hours as needed. No current facility-administered medications for this visit. Neurologic Exam  
 
Mental Status WD/WN adult in NAD, normal grooming VSS 
A&O x 3, tremulous at times, mildly depressed mood PERRL, nonicteric Face is symmetric, tongue midline Speech is fluent and clear No limb ataxia. Right arm resting tremor fluctuating in amplitude and frequency sometimes resolved Moving all extemities spontaneously and symmetric Normal gait CVS RRR Lungs nonlabored Skin is warm and dry Left posterior scalp mildly sensitive Visit Vitals /86 Pulse 99 Resp 18 Ht 5' 11\" (1.803 m) Wt 104.3 kg (230 lb) SpO2 97% BMI 32.08 kg/m² Assessment and Plan Diagnoses and all orders for this visit: 1. Syncope and collapse -     MRI BRAIN WO CONT; Future -     EEG AWAKE AND ASLEEP; Future 2. Concussion without loss of consciousness, sequela ([facility] [address] 
-     REFERRAL TO OPTOMETRY 3. Postconcussion syndrome 
-     REFERRAL TO OPTOMETRY 4. Intractable chronic migraine without aura and without status migrainosus 5. History of multiple concussions -     MRI BRAIN WO CONT; Future 26-year-old gentleman with a history of multiple concussive injuries who has had 2 new developments since I saw him last. 
1.  It sounds like he is having severe coughing fits that possibly increasing intracranial pressure potentially dampening of blood flow to the brain causing transient cerebral hypoperfusion with brief nonsustained vision impairments and tremulousness. I do not think these are epileptic seizures. 2.  He had a syncopal event about a week ago. This sounds suspicious for possibly orthostasis that led to him passing out but then I suspect he probably struck his head in the bathroom and developed another concussion. He still has scalp tenderness in the left posterior region. We will check EEG because of this syncopal event. Check MRI brain as well. I am concerned about these multiple head injuries causing a cumulative symptoms. 3.  Chronic migraines are still present but there is benefit from Botox injections as far as reduced intensity.   I do not think he is ever going to be headache free unfortunately given his multiple head injuries. He does meet criteria for chronic migraine as he has more than 15 days of headache pain/migraine per month lasting more than 4 hours. Recommend we continue Botox every 3 months. Continue his other medications. We had a long conversation about goals of care. I suspect that every time he has concussive or sub-concussive events he is developing a new normal with increasing sensitivity to become symptomatic in the setting of minimal stressors and triggers. Continue therapy. He is planning to see an optometrist who is trained in neuro-optometric rehabilitation which I think is a good idea. Appreciate the information he brings to clinic. I reviewed and decided to continue the current medications. More than 40 minutes of time was spent face-to-face with the patient discussing his previous symptoms, current new developments, plan moving forward. [address] [facility],  
NEUROLOGIST Diplomate CRESCENCIO

## 2019-03-19 NOTE — PROGRESS NOTES
Pt states he got up to go to the bathroom and last week and the next thing he remembers he was on the floor He has has more pain in the right front and left back. He wonders if he has been having some seizures. Pt will discuss with you.

## 2019-03-19 NOTE — Clinical Note
KK-sending you this encounter since he is having difficulty getting Botox covered. Documentation should be appropriate in this note.

## 2019-03-20 ENCOUNTER — DOCUMENTATION ONLY (OUTPATIENT)
Dept: NEUROLOGY | Age: 49
End: 2019-03-20

## 2019-03-27 ENCOUNTER — TELEPHONE (OUTPATIENT)
Dept: NEUROLOGY | Age: 49
End: 2019-03-27

## 2019-03-27 NOTE — TELEPHONE ENCOUNTER
Re: Botox    Called and s/w Tia @ [facility] to return call. Per [address] (who is in the PA dept), Botox medication needs a PA. I informed Fransisco Brady Julian that we have received all their faxes and phone calls and that we understand that a PA is needed. I informed her that pt's appt is not until May and that we will be processing the PA in April. She informed me that she will let her supervisor know. Botox PA's are processed the month before appts.

## 2019-03-27 NOTE — TELEPHONE ENCOUNTER
Patient is in need of prior authorization for Botox. Please advise.     Best # [phone] ([facility])

## 2019-04-03 ENCOUNTER — TELEPHONE (OUTPATIENT)
Dept: NEUROLOGY | Age: 49
End: 2019-04-03

## 2019-04-04 NOTE — TELEPHONE ENCOUNTER
Re: Botox     PA submitted via CMM to [email or web address]. Pending status:  \"[facility] is reviewing your PA request and will respond within 24-72 hours. To check for an update later, open this request from your dashboard. \"      Will update when determination is made.

## 2019-04-05 NOTE — TELEPHONE ENCOUNTER
Re: Botox    Approval received from [employer].  approved. Auth # [id number]. Auth good 3/5/19 - 4/3/20. Called and s/w Arianna Canela @ [facility] to setup Pre-Determination over the phone. Was transferred to clinical nurse Muna Sharp. Per Muna Sharp, she has started to setup the review but clinicals and the  approval letter need to be faxed to the dedicated nurse reviewer (Beltran Beck). Faxed clinical notes (3/19/19, 1/24/19, 11/28/18, 11/1/18, 8/21/18, and 1/17/17) and  approval letter to Beltran Beck. Fax confirmation received 4/5/19.    [id number] pending. Phone # to call and check Pre-D status is [phone] ext [phone].

## 2019-04-18 ENCOUNTER — HOSPITAL ENCOUNTER (OUTPATIENT)
Dept: MRI IMAGING | Age: 49
Discharge: HOME | End: 2019-04-18
Attending: PSYCHIATRY & NEUROLOGY
Payer: COMMERCIAL

## 2019-04-18 ENCOUNTER — HOSPITAL ENCOUNTER (OUTPATIENT)
Dept: NEUROLOGY | Age: 49
Discharge: HOME | End: 2019-04-18
Attending: PSYCHIATRY & NEUROLOGY
Payer: COMMERCIAL

## 2019-04-18 DIAGNOSIS — R55 SYNCOPE AND COLLAPSE: ICD-10-CM

## 2019-04-18 DIAGNOSIS — Z87.820 HISTORY OF MULTIPLE CONCUSSIONS: ICD-10-CM

## 2019-04-18 DIAGNOSIS — R25.9 ABNORMAL INVOLUNTARY MOVEMENT: ICD-10-CM

## 2019-04-18 PROCEDURE — 95819 EEG AWAKE AND ASLEEP: CPT

## 2019-04-18 PROCEDURE — 70551 MRI BRAIN STEM W/O DYE: CPT

## 2019-04-19 NOTE — TELEPHONE ENCOUNTER
Re: Botox    Received VM from Underwood @ [place]. Per Underwood:    [id number] approved. Auth # [id number]. Auth good 5/29/19 - 11/4/19.  approved until 4/3/20. SPP is [facility]. Phone # is [phone]. Forward to nurse.

## 2019-04-19 NOTE — PROGRESS NOTES
Please let him know that the new MRI of his brain looks good. When compared to the last scan everything looks the same which is good news. No change to the current plans in progress.

## 2019-04-19 NOTE — PROCEDURES
[address]   EEG    Name:  Christopher Camejo  MR#:  [id number]  :  1970  ACCOUNT #:  [de-identified]  DATE OF SERVICE:  2019      REQUESTING PHYSICIAN:  Dr. Mars Lainez    HISTORY:  The patient is a 50-year-old male who is being evaluated for abnormal involuntary movements in the setting of multiple head injuries. DESCRIPTION:  This is an 18-channel EEG performed on an awake and drowsy patient. During wakefulness, the dominant posterior background rhythm consists of symmetric very well modulated medium voltage rhythms in the 9 to 10 Hz frequency range out of the posterior head region. Faster beta frequencies are seen in the frontal head regions throughout the recording. Drowsiness is characterized by generalized slowing in the central head region. Photic stimulation and hyperventilation was not performed. EEG SUMMARY:  A normal study. CLINICAL INTERPRETATION:  This was a normal EEG during awake and drowsy states of the patient. No lateralizing or epileptiform features were noted.         Alexandru Chavez MD      [id number]  D:  2019 14:47  T:  2019 6:40  JOB #:  [id number]

## 2019-04-24 ENCOUNTER — TELEPHONE (OUTPATIENT)
Dept: NEUROLOGY | Age: 49
End: 2019-04-24

## 2019-04-24 NOTE — TELEPHONE ENCOUNTER
Recommend continuing Botox injections. He has less than 7 severe debilitating breakthrough migraines on therapy.     Prescription entered

## 2019-04-24 NOTE — TELEPHONE ENCOUNTER
Re: Botox    Received the following message from Louann Hensley:  \"Pts next botox is scheduled for 5/9/19, but his auth for [id number] is not good until 5/29/18. Emily Olivares we get this changed? \"    I did not realize the start date that Kateryna Muller provided was not the one I requested. Called and left another VM for Venkatesh asking for a call back. Called and s/w Evie Cee @ [facility] department to check the [id number] approval.  Per Evie Cee:   Correct Auth # is [id number]. Correct date range is 5/9/19 - 12/31/19. I informed Evie Cee that this was not the information I was provided on my VM. She assured me that this is the correct approval information. Call reference # [id number]. Updated Latoya. Forward to nurse.

## 2019-04-26 ENCOUNTER — DOCUMENTATION ONLY (OUTPATIENT)
Dept: NEUROLOGY | Age: 49
End: 2019-04-26

## 2019-05-09 ENCOUNTER — OFFICE VISIT (OUTPATIENT)
Dept: NEUROLOGY | Age: 49
End: 2019-05-09

## 2019-05-09 VITALS
SYSTOLIC BLOOD PRESSURE: 120 MMHG | RESPIRATION RATE: 18 BRPM | OXYGEN SATURATION: 98 % | BODY MASS INDEX: 31.78 KG/M2 | HEIGHT: 71 IN | WEIGHT: 227 LBS | HEART RATE: 82 BPM | DIASTOLIC BLOOD PRESSURE: 78 MMHG

## 2019-05-09 DIAGNOSIS — G43.719 INTRACTABLE CHRONIC MIGRAINE WITHOUT AURA AND WITHOUT STATUS MIGRAINOSUS: Primary | ICD-10-CM

## 2019-05-09 NOTE — PROGRESS NOTES
Botox Injection Note     2019     Patient:  Jatin Scott   YOB: 1970  Date of Visit: 2019      Indication: patient has chronic migraine headaches greater than 15 days per month lasting more than 4 hours each. She has failed or not tolerated 2 or more medication preventatives. Written consent was signed and verified by me. Risks and benefits were discussed to include possible cosmetic asymmetry which is not permament or life threatening. Patient name and  was confirmed prior to procedure. Time out performed. Procedure:   Botox concentration: 200 units in 2 ml of preservative-free normal saline. 1:1 dilution. LOT#: [id number]  EXP:  10 2021    Injections and distribution as follow :      Units/site  Sites Loc Subtotal    procerus 5 1 ML 5   corrugaters 2.5 2 BL 5   frontalis 5 8 BL 40   Temporalis 10 4 BL 40   Occipitalis 10 2 BL 20   Cervical paraspinals 5 4 BL 20   Trapezius 10 4 BL 40         200 units Botox were reconstituted, 170 units injected as above and the remainder was unavoidably wasted. Patient tolerated procedure well. Return in 3 months for repeat injections.     _____________________________   Merwin Lesch, DO  [facility], ABPN

## 2019-05-09 NOTE — PROGRESS NOTES
Identified pt with two pt identifiers(name and ). Reviewed record in preparation for visit and have obtained necessary documentation. Chief Complaint   Patient presents with    Procedure     Botox      Visit Vitals  /78 (BP 1 Location: Left arm, BP Patient Position: Sitting)   Pulse 82   Resp 18   Ht 5' 11\" (1.803 m)   Wt 103 kg (227 lb)   SpO2 98%   BMI 31.66 kg/m²         Health Maintenance Due   Topic    Pneumococcal 0-64 years (1 of 1 - PPSV23)    DTaP/Tdap/Td series (1 - Tdap)       Coordination of Care Questionnaire:  :   1) Have you been to an emergency room, urgent care, or hospitalized since your last visit? If yes, where when, and reason for visit? no       2. Have seen or consulted any other health care provider since your last visit? If yes, where when, and reason for visit? NO      3) Do you have an Advanced Directive/ Living Will in place? NO  If yes, do we have a copy on file NO  If no, would you like information NO    Patient is accompanied by self I have received verbal consent from Johana Palomo to discuss any/all medical information while they are present in the room.

## 2019-05-28 RX ORDER — VENLAFAXINE HYDROCHLORIDE 150 MG/1
CAPSULE, EXTENDED RELEASE ORAL
Qty: 90 CAP | Refills: 1 | Status: SHIPPED | OUTPATIENT
Start: 2019-05-28 | End: 2019-11-25 | Stop reason: SDUPTHER

## 2019-07-24 ENCOUNTER — OFFICE VISIT (OUTPATIENT)
Dept: NEUROLOGY | Age: 49
End: 2019-07-24

## 2019-07-24 ENCOUNTER — DOCUMENTATION ONLY (OUTPATIENT)
Dept: NEUROLOGY | Age: 49
End: 2019-07-24

## 2019-07-24 VITALS
DIASTOLIC BLOOD PRESSURE: 68 MMHG | SYSTOLIC BLOOD PRESSURE: 102 MMHG | HEIGHT: 71 IN | BODY MASS INDEX: 31.92 KG/M2 | WEIGHT: 228 LBS | RESPIRATION RATE: 16 BRPM | HEART RATE: 90 BPM | OXYGEN SATURATION: 96 %

## 2019-07-24 DIAGNOSIS — G43.719 INTRACTABLE CHRONIC MIGRAINE WITHOUT AURA AND WITHOUT STATUS MIGRAINOSUS: Primary | ICD-10-CM

## 2019-07-24 DIAGNOSIS — F07.81 POSTCONCUSSION SYNDROME: ICD-10-CM

## 2019-07-24 DIAGNOSIS — R25.9 ABNORMAL INVOLUNTARY MOVEMENT: ICD-10-CM

## 2019-07-24 DIAGNOSIS — Z87.820 HISTORY OF MULTIPLE CONCUSSIONS: ICD-10-CM

## 2019-07-24 RX ORDER — MEMANTINE HYDROCHLORIDE 14 MG/1
14 CAPSULE, EXTENDED RELEASE ORAL DAILY
Qty: 90 CAP | Refills: 2 | Status: SHIPPED | OUTPATIENT
Start: 2019-07-24 | End: 2020-04-04

## 2019-07-24 NOTE — PROGRESS NOTES
Pt here for follow up on his headaches. Bumped his head on the door frame of the car yesterday. Pt is still having daily headaches.

## 2019-07-24 NOTE — PROGRESS NOTES
Chief Complaint   Patient presents with    Migraine       HPI    Urban Dennison is a 48-year-old gentleman here to follow-up on persistent chronic concussive symptoms consisting of migraine, postconcussive symptoms, functional tremor, labile mood. Overall, he continues to function at a very high level professionally and personally. Since school ended for the year he has been traveling for work purposes. He participates in sports for overall good health. Despite all of this, he still is easily some of light sensitivity, sensitivity, mood changes. He struggles with insomnia recently since reducing melatonin to 3 mg nightly. He did hit his head yesterday afternoon on the car door while it was raining heavily. No loss of consciousness. He is thinking about CBD oil but has not decided to pursue that. He still is on the waiting list for team TBI testing at [facility]. He had Botox injections May 9 with good results. He did not have any debilitating severe breakthrough migraines like before. The tension type headache still persists. Background history:  ISADORA scan negative for Parkinson's  multiple migraine medications to include amitriptyline, gabapentin, verapamil, propranolol, Effexor, memantine, Botox, namenda  Formal neuropsychological testing without evidence of dementia or cognitive impairment, 2018  MRI brain and cervical spine both within normal limits        Review of Systems   Constitutional: Positive for malaise/fatigue. HENT: Negative for hearing loss. Eyes: Positive for blurred vision. Respiratory: Positive for cough. Gastrointestinal: Negative for vomiting. Neurological: Positive for tremors, loss of consciousness and headaches. Psychiatric/Behavioral: Positive for memory loss. The patient is nervous/anxious. All other systems reviewed and are negative.       Past Medical History:   Diagnosis Date    Allergic rhinitis 4/8/2012    Asthma 4/8/2012    Asthma     Cancer ([facility] [address]     skin ca exision from scalp w/ subsequent  local numbness     HX OTHER MEDICAL     rt shoulder posterior instability     Hypertension     Intractable chronic migraine without aura 2/9/2019    Dr.Kim Charles    Migraine 4/8/2012    Post concussion syndrome 2/9/2019    Sciatica     Sinus headache 4/8/2012    Tension headache 4/8/2012    Trauma     nasal fracture, 3 concussions, soft ball eye injury     Trauma 06/2016    concussion . dislocated jaw . eval'd by ENT .  Dr. Lisa Hernandez and Dentist     Tremor, coarse 2/9/2019     Family History   Problem Relation Age of Onset    Diabetes Mother     Thyroid Disease Mother     Diabetes Father     Thyroid Disease Sister     Parkinsonism Maternal Uncle     Dementia Paternal Grandfather      Social History     Socioeconomic History    Marital status:      Spouse name: Not on file    Number of children: Not on file    Years of education: Not on file    Highest education level: Not on file   Occupational History    Not on file   Social Needs    Financial resource strain: Not on file    Food insecurity:     Worry: Not on file     Inability: Not on file    Transportation needs:     Medical: Not on file     Non-medical: Not on file   Tobacco Use    Smoking status: Never Smoker    Smokeless tobacco: Never Used   Substance and Sexual Activity    Alcohol use: Yes     Comment: 1-3 drinks a day of wine a beer    Drug use: No    Sexual activity: Not on file   Lifestyle    Physical activity:     Days per week: Not on file     Minutes per session: Not on file    Stress: Not on file   Relationships    Social connections:     Talks on phone: Not on file     Gets together: Not on file     Attends Rastafari service: Not on file     Active member of club or organization: Not on file     Attends meetings of clubs or organizations: Not on file     Relationship status: Not on file    Intimate partner violence:     Fear of current or ex partner: Not on file Emotionally abused: Not on file     Physically abused: Not on file     Forced sexual activity: Not on file   Other Topics Concern    Not on file   Social History Narrative    Not on file     Allergies   Allergen Reactions    Amitriptyline Other (comments)     Mental slowing     Gabapentin Other (comments)     Weight gain          Current Outpatient Medications   Medication Sig    memantine ER (NAMENDA XR) 14 mg capsule Take 1 Cap by mouth daily.  venlafaxine-SR (EFFEXOR-XR) 150 mg capsule TAKE 1 CAPSULE DAILY    onabotulinumtoxinA (BOTOX) 200 unit injection Inject selected muscles head and neck bilaterally every 12 weeks  Indications: Migraine Prevention    amLODIPine-benazepril (LOTREL) 5-20 mg per capsule Take 1 Cap by mouth daily.  diclofenac EC (VOLTAREN) 75 mg EC tablet TAKE 1 TABLET (75 MG TOTAL) BY MOUTH 2 (TWO) TIMES A DAY.  melatonin tab tablet Take 5 mg by mouth nightly.  IBUPROFEN (ADVIL PO) Take  by mouth.  aspirin-acetaminophen-caffeine (EXCEDRIN ES) 250-250-65 mg per tablet Take 1 Tab by mouth.  DIPHENHYDRAMINE HCL (BENADRYL PO) Take  by mouth.  magnesium oxide (MAG-OX) 400 mg tablet Take 1 Tab by mouth nightly.  fluticasone (FLONASE) 50 mcg/actuation nasal spray 1 War by Both Nostrils route two (2) times a day.  montelukast (SINGULAIR) 10 mg tablet Take 10 mg by mouth daily.  Cetirizine (ZYRTEC) 10 mg Cap Take  by mouth.  albuterol (PROVENTIL HFA, VENTOLIN HFA) 90 mcg/actuation inhaler Take 2 Puffs by inhalation every four (4) hours as needed. No current facility-administered medications for this visit. Neurologic Exam     Mental Status   WD/WN adult in NAD, normal grooming  VSS  A&O x 3, tremulous at times, mildly depressed mood    PERRL, nonicteric  Face is symmetric, tongue midline  Speech is fluent and clear  No limb ataxia.   Right arm resting tremor fluctuating in amplitude and frequency sometimes resolved  Moving all extemities spontaneously and symmetric  Normal gait    CVS RRR  Lungs nonlabored  Skin is warm and dry  Left posterior scalp mildly sensitive         Visit Vitals  /68   Pulse 90   Resp 16   Ht 5' 11\" (1.803 m)   Wt 103.4 kg (228 lb)   SpO2 96%   BMI 31.80 kg/m²       Assessment and Plan   Diagnoses and all orders for this visit:    1. Intractable chronic migraine without aura and without status migrainosus    2. Abnormal involuntary movement    3. History of multiple concussions    4. Postconcussion syndrome    Other orders  -     memantine ER (NAMENDA XR) 14 mg capsule; Take 1 Cap by mouth daily. 53-year-old gentleman with history of multiple concussions who continues to have persistent chronic postconcussive symptoms manifesting as light and sound sensitivity, insomnia, mood lability. Cognitive processing it feels slowed but he is still performing optimally well professionally. At this juncture we are going to continue Botox since it is helping prevent his debilitating migraines having less than 7 severe breakthrough debilitating headaches. He still gets daily tension headache which is different. I am going to increase Namenda to 14 mg daily. He understands we are using this off label to treat headaches but also possibly with some benefit to his memory. No change to the other medications. We talked about CBD oil. He is going to think about it. I do not have much information to offer regarding CBD oil at this time. Continue good judgment with activities. Optimize sleep. Return to melatonin 5 mg. I will see him for Botox injections next month as scheduled and then for follow-up routinely.   53-year-old gentleman with a history of multiple concussive injuries who has had 2 new developments since I saw him last.        [address] [facility], [address]  Diplomate KAVYAN

## 2019-08-08 ENCOUNTER — OFFICE VISIT (OUTPATIENT)
Dept: NEUROLOGY | Age: 49
End: 2019-08-08

## 2019-08-08 VITALS
SYSTOLIC BLOOD PRESSURE: 120 MMHG | WEIGHT: 229 LBS | OXYGEN SATURATION: 98 % | HEART RATE: 87 BPM | HEIGHT: 71 IN | RESPIRATION RATE: 16 BRPM | BODY MASS INDEX: 32.06 KG/M2 | DIASTOLIC BLOOD PRESSURE: 86 MMHG

## 2019-08-08 DIAGNOSIS — G43.719 INTRACTABLE CHRONIC MIGRAINE WITHOUT AURA AND WITHOUT STATUS MIGRAINOSUS: Primary | ICD-10-CM

## 2019-08-08 NOTE — PROGRESS NOTES
Botox Injection Note     2019     Patient:  Hector Loglondon   YOB: 1970  Date of Visit: 2019      Indication: patient has chronic migraine headaches greater than 15 days per month lasting more than 4 hours each. She has failed or not tolerated 2 or more medication preventatives. Written consent was signed and verified by me. Risks and benefits were discussed to include possible cosmetic asymmetry which is not permament or life threatening. Patient name and  was confirmed prior to procedure. Time out performed. Procedure:   Botox concentration: 200 units in 2 ml of preservative-free normal saline. 1:1 dilution. LOT#: [id number]  EXP:  2022    Injections and distribution as follow :      Units/site  Sites Loc Subtotal    procerus 5 1 ML 5   corrugaters 2.5 2 BL 5   frontalis 5 8 BL 40   Temporalis 10 4 BL 40   Occipitalis 10 2 BL 20   Cervical paraspinals 5 4 BL 20   Trapezius 10 4 BL 40         200 units Botox were reconstituted, 170 units injected as above and the remainder was unavoidably wasted. Patient tolerated procedure well. Return in 3 months for repeat injections.     _____________________________   Margaret Link, DO  [address], ABPN

## 2019-11-07 ENCOUNTER — OFFICE VISIT (OUTPATIENT)
Dept: NEUROLOGY | Age: 49
End: 2019-11-07

## 2019-11-07 VITALS
SYSTOLIC BLOOD PRESSURE: 110 MMHG | WEIGHT: 234 LBS | DIASTOLIC BLOOD PRESSURE: 70 MMHG | BODY MASS INDEX: 32.76 KG/M2 | OXYGEN SATURATION: 98 % | HEART RATE: 90 BPM | HEIGHT: 71 IN | RESPIRATION RATE: 14 BRPM

## 2019-11-07 DIAGNOSIS — G43.719 INTRACTABLE CHRONIC MIGRAINE WITHOUT AURA AND WITHOUT STATUS MIGRAINOSUS: Primary | ICD-10-CM

## 2019-11-07 NOTE — PROGRESS NOTES
Botox Injection Note     2019     Patient:  Macy Ramirez   YOB: 1970  Date of Visit: 2019      Indication: patient has chronic migraine headaches greater than 15 days per month lasting more than 4 hours each. He has failed or not tolerated 2 or more medication preventatives. Written consent was signed and verified by me. Risks and benefits were discussed to include possible cosmetic asymmetry which is not permament or life threatening. Patient name and  was confirmed prior to procedure. Time out performed. Procedure:   Botox concentration: 200 units in 2 ml of preservative-free normal saline. 1:1 dilution. LOT#: [id number]  EXP:  2022    Injections and distribution as follow :      Units/site  Sites Loc Subtotal    procerus 5 1 ML 5   corrugaters 2.5 2 BL 5   frontalis 5 8 BL 40   Temporalis 10 4 BL 40   Occipitalis 10 2 BL 20   Cervical paraspinals 5 4 BL 20   Trapezius 10 4 BL 40         200 units Botox were reconstituted, 170 units injected as above and the remainder was unavoidably wasted. Patient tolerated procedure well. Return in 3 months for repeat injections.     _____________________________   Carlos A Clay,   [address], ABPN

## 2019-11-26 RX ORDER — VENLAFAXINE HYDROCHLORIDE 150 MG/1
CAPSULE, EXTENDED RELEASE ORAL
Qty: 90 CAP | Refills: 4 | Status: SHIPPED | OUTPATIENT
Start: 2019-11-26 | End: 2021-02-16

## 2020-01-01 NOTE — TELEPHONE ENCOUNTER
4/16 does not work for him. He is in [place] all week. He can do Monday 4/23. What time is good?
LM on pts VM.  4/23 at 1:15pm. Appt scheduled.
Pt calling to schedule his botox appt, stated medication should be in office. Pt stated he can be called tomorrow with appointment availability.
Statement Selected

## 2020-01-08 ENCOUNTER — TELEPHONE (OUTPATIENT)
Dept: NEUROLOGY | Age: 50
End: 2020-01-08

## 2020-01-10 NOTE — TELEPHONE ENCOUNTER
Re: Botox    -good thru 4/3/20. Called [facility] to initiate new PA for [id number]. Per Lis with [facility], PA is no longer required. I requested to submit a voluntary PA. Faxed clinicals to [phone], [place] clinical review team    [id number] [phone] Auth approved thru 4/3/20.

## 2020-01-22 ENCOUNTER — DOCUMENTATION ONLY (OUTPATIENT)
Dept: NEUROLOGY | Age: 50
End: 2020-01-22

## 2020-01-22 NOTE — PROGRESS NOTES
pts botox arrived today from [address] [facility]    Lot#  [id number]   Exp  6/30/22

## 2020-01-31 ENCOUNTER — OFFICE VISIT (OUTPATIENT)
Dept: NEUROLOGY | Age: 50
End: 2020-01-31

## 2020-01-31 VITALS
RESPIRATION RATE: 18 BRPM | HEART RATE: 127 BPM | BODY MASS INDEX: 32.78 KG/M2 | OXYGEN SATURATION: 98 % | SYSTOLIC BLOOD PRESSURE: 124 MMHG | DIASTOLIC BLOOD PRESSURE: 88 MMHG | WEIGHT: 235 LBS

## 2020-01-31 DIAGNOSIS — G43.719 INTRACTABLE CHRONIC MIGRAINE WITHOUT AURA AND WITHOUT STATUS MIGRAINOSUS: Primary | ICD-10-CM

## 2020-01-31 DIAGNOSIS — F07.81 POSTCONCUSSION SYNDROME: ICD-10-CM

## 2020-01-31 DIAGNOSIS — Z87.820 HISTORY OF MULTIPLE CONCUSSIONS: ICD-10-CM

## 2020-01-31 NOTE — PATIENT INSTRUCTIONS
[facility]   Statement to Patients  April 1, 2014      In an effort to ensure the large volume of patient prescription refills is processed in the most efficient and expeditious manner, we are asking our patients to assist us by calling your Pharmacy for all prescription refills, this will include also your  Mail Order Pharmacy. The pharmacy will contact our office electronically to continue the refill process. Please do not wait until the last minute to call your pharmacy. We need at least 48 hours (2days) to fill prescriptions. We also encourage you to call your pharmacy before going to  your prescription to make sure it is ready. With regard to controlled substance prescription refill requests (narcotic refills) that need to be picked up at our office, we ask your cooperation by providing us with at least 72 hours (3days) notice that you will need a refill. We will not refill narcotic prescription refill requests after 4:00pm on any weekday, Monday through Thursday, or after 2:00pm on Fridays, or on the weekends. We encourage everyone to explore another way of getting your prescription refill request processed using [employer], our patient web portal through our electronic medical record system. [employer] is an efficient and effective way to communicate your medication request directly to the office and  downloadable as an mily on your smart phone . [employer] also features a review functionality that allows you to view your medication list as well as leave messages for your physician. Are you ready to get connected? If so please review the attatched instructions or speak to any of our staff to get you set up right away! Thank you so much for your cooperation. Should you have any questions please contact our Practice Administrator.     The Physicians and Staff,  [facility]

## 2020-01-31 NOTE — PROGRESS NOTES
Botox Injection Note     2020     Patient:  Jacob Ann   YOB: 1970  Date of Visit: 2020      Indication: patient has chronic migraine headaches greater than 15 days per month lasting more than 4 hours each. He has failed or not tolerated 2 or more medication preventatives. Written consent was signed and verified by me. Risks and benefits were discussed to include possible cosmetic asymmetry which is not permament or life threatening. Patient name and  was confirmed prior to procedure. Time out performed. Procedure:   Botox concentration: 200 units in 2 ml of preservative-free normal saline. 1:1 dilution. LOT#: [id number]  EXP:  2022    Injections and distribution as follow :      Units/site  Sites Loc Subtotal    procerus 5 1 ML 5   corrugaters 2.5 2 BL 5   frontalis 5 8 BL 40   Temporalis 10 4 BL 40   Occipitalis 10 2 BL 20   Cervical paraspinals 5 4 BL 20   Trapezius 10 4 BL 40         200 units Botox were reconstituted, 170 units injected as above and the remainder was unavoidably wasted. Patient tolerated procedure well. Return in 3 months for repeat injections.     _____________________________   Marija Guardado, DO  [address], ABPN

## 2020-03-23 ENCOUNTER — TELEPHONE (OUTPATIENT)
Dept: NEUROLOGY | Age: 50
End: 2020-03-23

## 2020-03-23 NOTE — TELEPHONE ENCOUNTER
Re: Botox     Botox [place] [id number]----- 02/19/20-03/20/21. Botox 29556-UGF cert not req.  (Per Gem Gan @ [facility])       SPP is [facility]   [phone]

## 2020-04-04 RX ORDER — MEMANTINE HYDROCHLORIDE 14 MG/1
CAPSULE, EXTENDED RELEASE ORAL
Qty: 90 CAP | Refills: 3 | Status: SHIPPED | OUTPATIENT
Start: 2020-04-04 | End: 2021-04-02

## 2020-04-28 ENCOUNTER — DOCUMENTATION ONLY (OUTPATIENT)
Dept: NEUROLOGY | Age: 50
End: 2020-04-28

## 2020-04-28 NOTE — PROGRESS NOTES
Pts botox arrived in office today from [facility]   Exp  9/2022    Pts appt is 5/28 ( rescheduled due to Alex)
Uncooperative/Cooperative

## 2020-05-28 ENCOUNTER — TELEPHONE (OUTPATIENT)
Dept: NEUROLOGY | Age: 50
End: 2020-05-28

## 2020-05-28 ENCOUNTER — OFFICE VISIT (OUTPATIENT)
Dept: NEUROLOGY | Age: 50
End: 2020-05-28

## 2020-05-28 VITALS
RESPIRATION RATE: 18 BRPM | WEIGHT: 225 LBS | DIASTOLIC BLOOD PRESSURE: 90 MMHG | HEART RATE: 100 BPM | OXYGEN SATURATION: 98 % | SYSTOLIC BLOOD PRESSURE: 140 MMHG | BODY MASS INDEX: 31.38 KG/M2

## 2020-05-28 DIAGNOSIS — G43.719 INTRACTABLE CHRONIC MIGRAINE WITHOUT AURA AND WITHOUT STATUS MIGRAINOSUS: Primary | ICD-10-CM

## 2020-05-28 NOTE — PROGRESS NOTES
Botox Injection Note     2020     Patient:  Tory Velazquez   YOB: 1970  Date of Visit: 2020      Indication: patient has chronic migraine headaches greater than 15 days per month lasting more than 4 hours each. He has failed or not tolerated 2 or more medication preventatives. Written consent was signed and verified by me. Risks and benefits were discussed to include possible cosmetic asymmetry which is not permament or life threatening. Patient name and  was confirmed prior to procedure. Time out performed. Procedure:   Botox concentration: 200 units in 2 ml of preservative-free normal saline. 1:1 dilution. LOT#: [id number]  EXP:  2022    Injections and distribution as follow :      Units/site  Sites Loc Subtotal    procerus 5 1 ML 5   corrugaters 2.5 2 BL 5   frontalis 5 8 BL 40   Temporalis 10 4 BL 40   Occipitalis 10 2 BL 20   Cervical paraspinals 5 4 BL 20   Trapezius 10 4 BL 40         200 units Botox were reconstituted, 170 units injected as above and the remainder was unavoidably wasted. Patient tolerated procedure well. Return in 3 months for repeat injections.     _____________________________   Norm Hand, DO  [address], ABPN

## 2020-05-28 NOTE — TELEPHONE ENCOUNTER
Pt stopped by desk to schedule 6 week f/u per Dr. Mira Diaz. Spoke w/ Melvin and she stated she would look for somewhere to schedule. Please advise.

## 2020-06-02 ENCOUNTER — TELEPHONE (OUTPATIENT)
Dept: NEUROLOGY | Age: 50
End: 2020-06-02

## 2020-06-02 NOTE — TELEPHONE ENCOUNTER
Regarding: Referral Request  Contact: [phone]  ----- Message from [address] [facility], DO sent at 6/2/2020 11:25 AM EDT -----  See my note, MRI ordered     ----- Message from Irene Saldivar to Karthikdaryl YEBOAH DO sent at 6/1/2020  2:47 PM -----   Dr. Shilpa Morfin- Botox shots have helped minimize headaches but have not done anything to alleviate the neck pain: it keeps me up at night & impacts ability to work effectively. You mentioned that it might be time for another neck MRI or time to schedule an appointment with a spine specialist.  Is there one in particular that you recommend and/or what do you know of Donato Sun: his boys went to [facility] &  wife is Dir. of Health Services (office around the corner from me). Is he the person to see? I hope we can meet in 6 weeks to talk about other ongoing issues w/ the long term post PCS. Dionicio Peterson said that there are no appointments available until Sept, but someone might call me soon to schedule something. [phone]  thank you!  Tatyana Bean

## 2020-06-10 ENCOUNTER — HOSPITAL ENCOUNTER (OUTPATIENT)
Dept: MRI IMAGING | Age: 50
Discharge: HOME | End: 2020-06-10
Payer: COMMERCIAL

## 2020-06-10 DIAGNOSIS — M50.30 DDD (DEGENERATIVE DISC DISEASE), CERVICAL: ICD-10-CM

## 2020-06-10 DIAGNOSIS — M48.02 CERVICAL STENOSIS OF SPINAL CANAL: ICD-10-CM

## 2020-06-10 PROCEDURE — 72141 MRI NECK SPINE W/O DYE: CPT

## 2020-06-11 ENCOUNTER — TELEPHONE (OUTPATIENT)
Dept: NEUROLOGY | Age: 50
End: 2020-06-11

## 2020-06-11 NOTE — TELEPHONE ENCOUNTER
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 It is on the right side of the neck and it also appears to possibly be pinching the nerve. When compared to 2018 this is a little bit more progress now. I do think he should see an orthopedic spine specialist.  Dr. Chase Persaud is appropriate. Or Dr. Gilberto Mars. Both are with [address] [place] [address]. I can place a referral if he needs one. Otherwise the rest of the spine looks fine.

## 2020-06-11 NOTE — TELEPHONE ENCOUNTER
Patient called, name and  verified and given results. Patient stated he already has an appointment with Dr. Althea Jansen for this upcoming Monday, 06/15/2020, and he does not need a referral from his understanding.

## 2020-08-06 ENCOUNTER — TELEPHONE (OUTPATIENT)
Dept: NEUROLOGY | Facility: CLINIC | Age: 50
End: 2020-08-06

## 2020-08-10 ENCOUNTER — TELEPHONE (OUTPATIENT)
Dept: NEUROLOGY | Facility: CLINIC | Age: 50
End: 2020-08-10

## 2020-08-10 RX ORDER — ONABOTULINUMTOXINA 200 [USP'U]/1
INJECTION, POWDER, LYOPHILIZED, FOR SOLUTION INTRADERMAL; INTRAMUSCULAR
Qty: 200 UNITS | Refills: 3 | Status: SHIPPED | OUTPATIENT
Start: 2020-08-10

## 2020-08-11 ENCOUNTER — DOCUMENTATION ONLY (OUTPATIENT)
Dept: NEUROLOGY | Facility: CLINIC | Age: 50
End: 2020-08-11

## 2020-08-20 ENCOUNTER — OFFICE VISIT (OUTPATIENT)
Dept: NEUROLOGY | Facility: CLINIC | Age: 50
End: 2020-08-20
Payer: COMMERCIAL

## 2020-08-20 VITALS
OXYGEN SATURATION: 97 % | HEART RATE: 84 BPM | SYSTOLIC BLOOD PRESSURE: 128 MMHG | BODY MASS INDEX: 30.48 KG/M2 | DIASTOLIC BLOOD PRESSURE: 82 MMHG | HEIGHT: 72 IN | TEMPERATURE: 98.4 F | WEIGHT: 225 LBS

## 2020-08-20 DIAGNOSIS — G43.719 INTRACTABLE CHRONIC MIGRAINE WITHOUT AURA AND WITHOUT STATUS MIGRAINOSUS: Primary | ICD-10-CM

## 2020-08-20 PROCEDURE — 64615 CHEMODENERV MUSC MIGRAINE: CPT | Performed by: PSYCHIATRY & NEUROLOGY

## 2020-08-20 NOTE — PROGRESS NOTES
Botox Injection Note     2020     Patient:  Kylie Rivera   YOB: 1970  Date of Visit: 2020      Indication: patient has chronic migraine headaches greater than 15 days per month lasting more than 4 hours each. He has failed or not tolerated 2 or more medication preventatives. Written consent was signed and verified by me. Risks and benefits were discussed to include possible cosmetic asymmetry which is not permament or life threatening. Patient name and  was confirmed prior to procedure. Time out performed. Procedure:   Botox concentration: 200 units in 2 ml of preservative-free normal saline. 1:1 dilution. LOT#: [id number]  EXP:  2023    Injections and distribution as follow :      Units/site  Sites Loc Subtotal    procerus 5 1 ML 5   corrugaters 2.5 2 BL 5   frontalis 5 8 BL 40   Temporalis 10 4 BL 40   Occipitalis 10 2 BL 20   Cervical paraspinals 5 4 BL 20   Trapezius 10 4 BL 40         200 units Botox were reconstituted, 170 units injected as above and the remainder was unavoidably wasted. Patient tolerated procedure well. Return in 3 months for repeat injections.     _____________________________   Woody Vicente DO  [address], ABPN

## 2020-08-20 NOTE — PROGRESS NOTES
Chief Complaint   Patient presents with    Follow-up     migraines, here for BOTOX     Visit Vitals  /82 (BP 1 Location: Right arm, BP Patient Position: Sitting)   Pulse 84   Temp 98.4 °F (36.9 °C) (Oral)   Ht 5' 11.75\" (1.822 m)   Wt 102.1 kg (225 lb)   SpO2 97%   BMI 30.73 kg/m²

## 2020-11-12 ENCOUNTER — DOCUMENTATION ONLY (OUTPATIENT)
Dept: NEUROLOGY | Age: 50
End: 2020-11-12

## 2020-11-18 ENCOUNTER — TRANSCRIBE ORDER (OUTPATIENT)
Dept: REGISTRATION | Age: 50
End: 2020-11-18

## 2020-11-18 DIAGNOSIS — Z01.812 PRE-PROCEDURE LAB EXAM: Primary | ICD-10-CM

## 2020-11-19 ENCOUNTER — OFFICE VISIT (OUTPATIENT)
Dept: NEUROLOGY | Age: 50
End: 2020-11-19
Payer: COMMERCIAL

## 2020-11-19 VITALS
RESPIRATION RATE: 16 BRPM | WEIGHT: 229 LBS | OXYGEN SATURATION: 100 % | BODY MASS INDEX: 32.06 KG/M2 | HEART RATE: 100 BPM | SYSTOLIC BLOOD PRESSURE: 110 MMHG | DIASTOLIC BLOOD PRESSURE: 78 MMHG | HEIGHT: 71 IN

## 2020-11-19 DIAGNOSIS — G43.719 INTRACTABLE CHRONIC MIGRAINE WITHOUT AURA AND WITHOUT STATUS MIGRAINOSUS: Primary | ICD-10-CM

## 2020-11-19 PROCEDURE — 64615 CHEMODENERV MUSC MIGRAINE: CPT | Performed by: PSYCHIATRY & NEUROLOGY

## 2020-11-19 NOTE — PROGRESS NOTES
Botox Injection Note     2020     Patient:  Roxana Larsen   YOB: 1970  Date of Visit: 2020      Indication: patient has chronic migraine headaches greater than 15 days per month lasting more than 4 hours each. He has failed or not tolerated 2 or more medication preventatives. Written consent was signed and verified by me. Risks and benefits were discussed to include possible cosmetic asymmetry which is not permament or life threatening. Patient name and  was confirmed prior to procedure. Time out performed. Procedure:   Botox concentration: 200 units in 2 ml of preservative-free normal saline. 1:1 dilution. LOT#: [id number]  EXP:  2023    Injections and distribution as follow :      Units/site  Sites Loc Subtotal    procerus 5 1 ML 5   corrugaters 2.5 2 BL 5   frontalis 5 8 BL 40   Temporalis 10 4 BL 40   Occipitalis 10 2 BL 20   Cervical paraspinals 5 4 BL 20   Trapezius 10 4 BL 40         200 units Botox were reconstituted, 170 units injected as above and the remainder was unavoidably wasted. Patient tolerated procedure well. Return in 3 months for repeat injections.     _____________________________   Bunny Humphreys,   [address], ABPN

## 2020-11-23 ENCOUNTER — HOSPITAL ENCOUNTER (OUTPATIENT)
Dept: PREADMISSION TESTING | Age: 50
Discharge: HOME | End: 2020-11-23
Payer: COMMERCIAL

## 2020-11-23 VITALS
TEMPERATURE: 98 F | WEIGHT: 231.04 LBS | DIASTOLIC BLOOD PRESSURE: 84 MMHG | BODY MASS INDEX: 32.35 KG/M2 | HEART RATE: 80 BPM | HEIGHT: 71 IN | RESPIRATION RATE: 18 BRPM | SYSTOLIC BLOOD PRESSURE: 125 MMHG

## 2020-11-23 LAB
ABO + RH BLD: NORMAL
BLOOD GROUP ANTIBODIES SERPL: NORMAL
EST. AVERAGE GLUCOSE BLD GHB EST-MCNC: 111 MG/DL
HBA1C MFR BLD: 5.5 % (ref 4–5.6)
INR PPP: 1 (ref 0.9–1.1)
PROTHROMBIN TIME: 10.6 SEC (ref 9–11.1)
SPECIMEN EXP DATE BLD: NORMAL

## 2020-11-23 PROCEDURE — 36415 COLL VENOUS BLD VENIPUNCTURE: CPT

## 2020-11-23 PROCEDURE — 86900 BLOOD TYPING SEROLOGIC ABO: CPT

## 2020-11-23 PROCEDURE — 85610 PROTHROMBIN TIME: CPT

## 2020-11-23 PROCEDURE — 83036 HEMOGLOBIN GLYCOSYLATED A1C: CPT

## 2020-11-23 RX ORDER — PSEUDOEPHEDRINE HCL 30 MG
30 TABLET ORAL
COMMUNITY
End: 2021-05-13 | Stop reason: ALTCHOICE

## 2020-11-23 NOTE — PERIOP NOTES
PRE-OPERATIVE INSTRUCTIONS REVIEWED WITH PATIENT. PT GIVEN TIME TO ASK QUESTIONS   PATIENT GIVEN 2-BOTTLE OF CHG SOAP. REVIEWED   PATIENT GIVEN SSI INFECTION FAQ SHEET.     INSTRUCTIONS GIVEN ON NEW ADMISSION REVIEWED COVID

## 2020-11-23 NOTE — PERIOP NOTES
PAT Nurse Practitioner   Pre-Operative Chart Review/Assessment:-ORTHOPEDIC/NEUROSURGICAL SPINE                Patient Name:  Vahid Hermosillo                                                           Age:   45 y.o.    :  1970     Today's Date:  2020     Date of PAT:   2020      Date of Surgery:    2020      Procedure(s):  C5-C6 Arthroplasty     Surgeon:   Dr. Mathew Cali                       PLAN:       1)  PCP: Dr. Ema Conde IV        2)  Cardiac Clearance: Not requested. 3)  Diabetic Treatment Consult: Not indicated. A1c-5.5       4)  Sleep Apnea evaluation:  Not indicated. JORGE Score 3.       5)  Treatment for MRSA/Staph Aureus: Neg       6)  Additional Concerns: \"Slow to wake,\" EtOH: 10-14 drinks/week, HTN, asthma, migraines, post concussion syndrome (followed by Dr. Javier Moya)              Vital Signs:         Vitals:    20 1522   BP: 125/84   Pulse: 80   Resp: 18   Temp: 98 °F (36.7 °C)   Weight: 104.8 kg (231 lb 0.7 oz)   Height: 5' 11\" (1.803 m)            ____________________________________________  PAST MEDICAL HISTORY  Past Medical History:   Diagnosis Date    Adverse effect of anesthesia     SLOW TO AROUSE     Allergic rhinitis 2012    Asthma 2012    Balance disorder     Cancer ([address]     skin ca exision from scalp w/ subsequent  local numbness     HX OTHER MEDICAL     rt shoulder posterior instability     Hypertension     Ill-defined condition     REYNALDS     Intractable chronic migraine without aura 2019    Dr.Kim Charles    Migraine 2012    Post concussion syndrome 2019    Psychiatric disorder     DEPRESSION DUE TO CONCUSSIONS     Right elbow tendinitis     Sciatica     Sinus headache 2012    Tension headache 2012    Trauma     nasal fracture, 3 concussions, soft ball eye injury     Trauma 2016    concussion . dislocated jaw . eval'd by ENT .  Dr. Katie Lin and Dentist     Tremor, coarse 2019    Vertigo ____________________________________________  PAST SURGICAL HISTORY  Past Surgical History:   Procedure Laterality Date    HX CHOLECYSTECTOMY      HX HEENT  march 2015    Septoplasty, sinoplasty, turbinate reduction , Dr. Manish Sousa [address]      inguinal     HX ORTHOPAEDIC  12/2018    Right Rotator Cuff Repair. Sree Ireland MD CARTILADGE, BONE SPUR     HX OTHER SURGICAL      skin ca excision from face.  HX VASECTOMY      HX WISDOM TEETH EXTRACTION      X4 AT 22 YRS OLD       ____________________________________________  HOME MEDICATIONS    Current Outpatient Medications   Medication Sig    pseudoephedrine (Sudafed) 30 mg tablet Take 30 mg by mouth every four (4) hours as needed for Congestion.  ibuprofen/diphenhydramine cit (ADVIL PM PO) Take  by mouth.  ibuprofen-phenylephrine (AdviL Sinus Congestion-Pain) 200-10 mg tab Take  by mouth.  onabotulinumtoxinA (Botox) 200 unit injection Inject selected muscles head and neck bilaterally every 12 weeks  Indications: migraine prevention    amLODIPine-benazepril (LOTREL) 5-20 mg per capsule TAKE 1 CAPSULE DAILY    memantine ER (NAMENDA XR) 14 mg capsule TAKE 1 CAPSULE DAILY (Patient taking differently: 14 mg daily.)    venlafaxine-SR (EFFEXOR-XR) 150 mg capsule TAKE 1 CAPSULE DAILY    melatonin tab tablet Take 5 mg by mouth nightly.  IBUPROFEN (ADVIL PO) Take  by mouth.  aspirin-acetaminophen-caffeine (EXCEDRIN ES) 250-250-65 mg per tablet Take 1 Tab by mouth.  magnesium oxide (MAG-OX) 400 mg tablet Take 1 Tab by mouth nightly.  fluticasone (FLONASE) 50 mcg/actuation nasal spray 1 [place] by Both Nostrils route two (2) times a day.  montelukast (SINGULAIR) 10 mg tablet Take 10 mg by mouth daily.  Cetirizine (ZYRTEC) 10 mg Cap Take  by mouth.  albuterol (PROVENTIL HFA, VENTOLIN HFA) 90 mcg/actuation inhaler Take 2 Puffs by inhalation every four (4) hours as needed.        No current facility-administered medications for this encounter.       ____________________________________________  ALLERGIES  Allergies   Allergen Reactions    Amitriptyline Other (comments)     Mental slowing     Gabapentin Other (comments)     Weight gain       ____________________________________________  SOCIAL HISTORY  Social History     Tobacco Use    Smoking status: Never Smoker    Smokeless tobacco: Never Used   Substance Use Topics    Alcohol use: Yes     Comment: 10-14 drinks per week      ____________________________________________        Labs:     Hospital Outpatient Visit on 11/23/2020   Component Date Value Ref Range Status    INR 11/23/2020 1.0  0.9 - 1.1   Final    A single therapeutic range for Vit K antagonists may not be optimal for all indications - see June, 2008 issue of Chest, American College of Chest Physicians Evidence-Based Clinical Practice Guidelines, 8th Edition.     Prothrombin time 11/23/2020 10.6  9.0 - 11.1 sec Final    Hemoglobin A1c 11/23/2020 5.5  4.0 - 5.6 % Final    Comment: NEW METHOD  PLEASE NOTE NEW REFERENCE RANGE  (NOTE)  HbA1C Interpretive Ranges  <5.7              Normal  5.7 - 6.4         Consider Prediabetes  >6.5              Consider Diabetes      Est. average glucose 11/23/2020 111  mg/dL Final    Special Requests: 11/23/2020 NO SPECIAL REQUESTS    Final    Culture result: 11/23/2020 MRSA NOT PRESENT    Final            Crossmatch Expiration 11/23/2020 12/05/2020,2359   Final    ABO/Rh(D) 11/23/2020 B POSITIVE   Final    Antibody screen 11/23/2020 NEG   Final   Office Visit on 11/17/2020   Component Date Value Ref Range Status    WBC 11/17/2020 8.6  3.4 - 10.8 x10E3/uL Final    RBC 11/17/2020 5.61  4.14 - 5.80 x10E6/uL Final    HGB 11/17/2020 15.4  13.0 - 17.7 g/dL Final    HCT 11/17/2020 47.7  37.5 - 51.0 % Final    MCV 11/17/2020 85  79 - 97 fL Final    MCH 11/17/2020 27.5  26.6 - 33.0 pg Final    MCHC 11/17/2020 32.3  31.5 - 35.7 g/dL Final    RDW 11/17/2020 12.9  11.6 - 15.4 % Final    PLATELET 99/42/8353 577  150 - 450 x10E3/uL Final    NEUTROPHILS 11/17/2020 85  Not Estab. % Final    Lymphocytes 11/17/2020 6  Not Estab. % Final    MONOCYTES 11/17/2020 8  Not Estab. % Final    EOSINOPHILS 11/17/2020 0  Not Estab. % Final    BASOPHILS 11/17/2020 0  Not Estab. % Final    ABS. NEUTROPHILS 11/17/2020 7.4* 1.4 - 7.0 x10E3/uL Final    Abs Lymphocytes 11/17/2020 0.5* 0.7 - 3.1 x10E3/uL Final    ABS. MONOCYTES 11/17/2020 0.7  0.1 - 0.9 x10E3/uL Final    ABS. EOSINOPHILS 11/17/2020 0.0  0.0 - 0.4 x10E3/uL Final    ABS. BASOPHILS 11/17/2020 0.0  0.0 - 0.2 x10E3/uL Final    IMMATURE GRANULOCYTES 11/17/2020 1  Not Estab. % Final    ABS. IMM. GRANS.  11/17/2020 0.0  0.0 - 0.1 x10E3/uL Final    VALID INTERNAL CONTROL POC 11/17/2020 Yes   Final    Prothrombin time (POC) 11/17/2020 1.0* 8.7 - 11.5 seconds Final    INR POC 11/17/2020 1.0  0.8 - 1.2 Final    Hemoglobin A1c (POC) 11/17/2020 5.5  4.8 - 5.6 % Final    Color (UA POC) 11/17/2020 Yellow   Final    Clarity (UA POC) 11/17/2020 Clear   Final    Glucose (UA POC) 11/17/2020 Negative  Negative Final    Bilirubin (UA POC) 11/17/2020 Negative  Negative Final    Ketones (UA POC) 11/17/2020 1+  Negative Final    Specific gravity (UA POC) 11/17/2020 1.025  1.001 - 1.035 Final    Blood (UA POC) 11/17/2020 Negative  Negative Final    pH (UA POC) 11/17/2020 6.0  4.6 - 8.0 Final    Protein (UA POC) 11/17/2020 Negative  Negative Final    Urobilinogen (UA POC) 11/17/2020 0.2 mg/dL  0.2 - 1 Final    Nitrites (UA POC) 11/17/2020 Negative  Negative Final    Leukocyte esterase (UA POC) 11/17/2020 Negative  Negative Final    Glucose 11/17/2020 111* 65 - 99 mg/dL Final    BUN 11/17/2020 14  6 - 24 mg/dL Final    Creatinine 11/17/2020 0.84  0.76 - 1.27 mg/dL Final    GFR est non-AA 11/17/2020 102  >59 mL/min/1.73 Final    GFR est AA 11/17/2020 118  >59 mL/min/1.73 Final    BUN/Creatinine ratio 11/17/2020 17  9 - 20 Final    Sodium 11/17/2020 138  134 - 144 mmol/L Final    Potassium 11/17/2020 4.7  3.5 - 5.2 mmol/L Final    Chloride 11/17/2020 100  96 - 106 mmol/L Final    CO2 11/17/2020 24  20 - 29 mmol/L Final    Calcium 11/17/2020 9.4  8.7 - 10.2 mg/dL Final    Prostate Specific Ag 11/17/2020 0.3  0.0 - 4.0 ng/mL Final    Comment: Roche ECLIA methodology. According to the American Urological Association, Serum PSA should  decrease and remain at undetectable levels after radical  prostatectomy. The AUA defines biochemical recurrence as an initial  PSA value 0.2 ng/mL or greater followed by a subsequent confirmatory  PSA value 0.2 ng/mL or greater. Values obtained with different assay methods or kits cannot be used  interchangeably. Results cannot be interpreted as absolute evidence  of the presence or absence of malignant disease.  Cholesterol, total 11/17/2020 182  100 - 199 mg/dL Final    Triglyceride 11/17/2020 77  0 - 149 mg/dL Final    HDL Cholesterol 11/17/2020 51  >39 mg/dL Final    VLDL Cholesterol [place] 11/17/2020 14  5 - 40 mg/dL Final    LDL Chol Calc (NIH) 11/17/2020 117* 0 - 99 mg/dL Final        Skin:     Denies open wounds, cuts, sores, rashes or other areas of concern in PAT assessment.         Ely Cheung NP

## 2020-11-24 LAB
BACTERIA SPEC CULT: NORMAL
BACTERIA SPEC CULT: NORMAL
SERVICE CMNT-IMP: NORMAL

## 2020-11-24 NOTE — H&P
Walker Whitaker  Location: - [facility]  Patient #: [id number]  : 1970   / Language: English / Race: White  Male    History of Present Illness   The patient is a 47year old male who presents with neck pain. Symptoms include neck pain. The patient describes the pain as sharp, dull, aching and burning. The symptoms occur constantly and intermittently. The patient describes symptoms as severe and worsening. Past evaluation has included cervical spine x-rays and cervical spine MRI. Past treatment has included physical therapy and spinal injections. Additional reasons for visit:    Transition into care    Allergies  No Known Drug Allergies   [06/15/2020]:      Date of Surgery Update:  Walker Whitaker was seen and examined. History and physical has been reviewed. The patient has been examined. There have been no significant clinical changes since the completion of the originally dated History and Physical.    Signed By: Jayant Givens MD     2020 9:09 AM             Family History   Breast cancer   Mother. Diabetes Mellitus   Father. Thyroid problems   Mother. Social History   Alcohol use   3-5 drinks per occasion, 6 times, Drinks beer, Drinks hard liquor, Drinks wine, Occasionally drinks more than 5 drinks per occasion, per week. Caffeine use   3-4 drinks per day, Coffee. Current work status   Full-time. Exercise   5-6 times per week, Other, walking. Marital status   Single. No drug use    Seat Belt Use   Always uses seat belts. Sun Exposure   Occasionally. Tobacco use   Never smoker. Medication History   amLODIPine Besy-Benazepril HCl  (5-20MG Capsule, Oral) Active. Botox  (200UNIT For Solution, Injection) Active. Diclofenac Sodium  (75MG Tablet DR, Oral) Active. Fluticasone Propionate  (50MCG/ACT Suspension, Nasal) Active. Memantine HCl ER  (14MG Capsule ER 24HR, Oral) Active. Montelukast Sodium  (10MG Tablet, Oral) Active.   Venlafaxine HCl ER  (150MG Capsule ER 24HR, Oral) Active. Medications Reconciled     Past Surgical History   Abdominal Hernia Surgery    Gallbladder Surgery   laparoscopic  Other Joint Surgery    Rotator Cuff Surgery   right  Sinus Surgery    Straighten Nasal Septum    Vasectomy      Diagnostic Studies History   Cervical Spine X-ray   Date: 6/15/2020, Results: Maintained cervical lordosis; no subluxations or fractures or lytic lesions are noted. Milde degenerative narrowing at C5-6  MRI, Cervical Spine   Date: 6/10/2020, Results: Moderate right sided disc protrusions at C5-6 with severe forainal stenosis and moderate cord flattening on the right. No cord changes. Remaineder of the levels are intact. Other Problems   Asthma    Depression    High blood pressure    Migraine Headache    Skin Cancer      Review of Systems  General Present- Seasonal Allergies. Not Present- HIV Exposure and Persistent Infections. HEENT Present- Ringing in the Ears. Not Present- Decreased Hearing, Earache, Hoarseness, Loose Teeth, Nose Bleed and Sore Throat. Musculoskeletal Present- Back Pain and Joint Stiffness. Not Present- Joint Pain and Joint Swelling. Neurological Present- Headaches, Memory Loss, Numbness, Tingling, Tremor and Unsteadiness. Not Present- Fainting, Seizures and Weakness. Psychiatric Present- Depression. Not Present- Anxiety and Bipolar. Endocrine Present- Cold Intolerance. Not Present- Excessive Hunger, Excessive Thirst, Excessive Urination and Heat Intolerance. Vitals   Weight: 225 lb   Height: 72 in   Weight was reported by patient. Height was reported by patient. Body Surface Area: 2.24 m²   Body Mass Index: 30.52 kg/m²      Physical Exam  Neurologic  Overall Assessment of Muscle Strength and Tone reveals  Upper Extremities - Right Deltoid - 5/5. Left Deltoid - 5/5. Right Bicep - 4-/5. Left Bicep - 5/5. Right Tricep - 5/5. Left Tricep - 5/5. Right Wrist Extensors - 5/5. Left Wrist Extensors - 4/5. Right Wrist Flexors - 5/5.  Left Wrist Flexors - 5/5. Right Intrinsics - 5/5. Left Intrinsics - 5/5. General Assessment of Reflexes  Right Hand - Ghosh's sign is positive in the right hand. Left Hand - Ghosh's sign is negative in the left hand. Reflexes (Dermatomes)  2/2 Normal - Left Bicep (C5-6), Left Tricep (C7-8), Left Brachioradialis (C5-6), Right Bicep (C5-6), Right Tricep (C7-8) and Right Brachioradialis (C5-6). Musculoskeletal  Global Assessment  Examination of related systems reveals - well-developed, well-nourished, in no acute distress, alert and oriented x 3 and normal coordination. Gait and Station - normal gait and station and normal posture. Spine/Ribs/Pelvis  Cervical Spine - Evaluation of related systems reveals - no lymphadenopathy and neurovascularly intact bilaterally. Inspection and Palpation - Tenderness - moderate and localized. Assessment of pain reveals the following findings: - Location - cervical area, upper trapezius area, (R), mid scapular area, (R) and right arm. ROJM - Flexion - 85 °. Right Lateral Flexion - 35 °. Left Lateral Flexion - 35 °. Extension - 70 °. Right Rotation - 80 °. Left Rotation - 80 °. Cervical Spine - Functional Testing - Foraminal Compression/Spurling's Test negative, Shoulder Depression Test negative, Upper Limb Tension Test negative. Lumbosacral Spine - Examination of the lumbosacral spine reveals - no tenderness to palpation, no pain, normal strength and tone, no laxity or crepitus and normal lumbosacral spine movements. Assessment & Plan     Herniated nucleus pulposus, C5-6 (722.0  M50.222)  Impression: Moderate stenosis at C5-6 which has worsened in laast two years. Also moderate cord compressiuon. We discussed that this would cause mayne some of the perceived instability and certainly some of the right shoulder and arm symptoms. I do not think the herniation is a cause of his post-concussion symptoms.  He is however a candidate for a an anterior cervical diskectomy at C5-6 and possible disc arthroplasty given his younger age and minimal degenerative changes at other levels. The risks and benefits were discussed at length with the patient and the patient has elected to proceed. Indications for surgery include failed conservative treatment. Alternative treatments, risks and the perioperative course were discussed with the patient. All questions were answered. The risks and benefits of the procedure were explained. Benefits include definitive diagnosis, relief of pain, elimination of deformity and improved function. Risks of surgery including bleeding, infection, weakness, numbness, CSF leak, failure to improve symptoms, exacerbation of medical co-morbidities and even death were discussed with the patient. Current Plans  X-RAY EXAM OF CERVICAL SPINE MIN 4 VIEWS (41404) (AP, Lateral and Flexion and Extension views were taken today using Digital Radiography.)  Pt Education - How to Access Health Information Online using Patient Portal and 3rd Party Apps: discussed with patient and provided information. Pt Education - Educational materials were provided.: discussed with patient and provided information.     Chronic neck pain (723.1  M54.2)    Cervicalgia (723.1  M54.2)    Treatment options were discussed with the patient in full.(V65.49)    Current Plans  Presurgical planning was preformed with the patient today  Surgery to be scheduled  PProcedure; C5-6 disc arthroplasty    Signed by Joao Mancini MD

## 2020-11-28 ENCOUNTER — HOSPITAL ENCOUNTER (OUTPATIENT)
Dept: PREADMISSION TESTING | Age: 50
Discharge: HOME | End: 2020-11-28
Payer: COMMERCIAL

## 2020-11-28 DIAGNOSIS — Z01.812 PRE-PROCEDURE LAB EXAM: ICD-10-CM

## 2020-11-28 PROCEDURE — 87635 SARS-COV-2 COVID-19 AMP PRB: CPT

## 2020-11-29 LAB — SARS-COV-2, COV2NT: NOT DETECTED

## 2020-12-01 ENCOUNTER — ANESTHESIA EVENT (OUTPATIENT)
Dept: SURGERY | Age: 50
End: 2020-12-01
Payer: COMMERCIAL

## 2020-12-02 ENCOUNTER — HOSPITAL ENCOUNTER (OUTPATIENT)
Age: 50
Discharge: HOME | End: 2020-12-03
Attending: ORTHOPAEDIC SURGERY | Admitting: ORTHOPAEDIC SURGERY
Payer: COMMERCIAL

## 2020-12-02 ENCOUNTER — ANESTHESIA (OUTPATIENT)
Dept: SURGERY | Age: 50
End: 2020-12-02
Payer: COMMERCIAL

## 2020-12-02 DIAGNOSIS — Z98.890 H/O CERVICAL SPINE SURGERY: Primary | ICD-10-CM

## 2020-12-02 PROBLEM — M48.02 CERVICAL SPINAL STENOSIS: Status: ACTIVE | Noted: 2020-12-02

## 2020-12-02 PROBLEM — M50.20 HNP (HERNIATED NUCLEUS PULPOSUS), CERVICAL: Status: ACTIVE | Noted: 2020-12-02

## 2020-12-02 PROCEDURE — 77030026438 HC STYL ET INTUB CARD -A: Performed by: ANESTHESIOLOGY

## 2020-12-02 PROCEDURE — C1713 ANCHOR/SCREW BN/BN,TIS/BN: HCPCS | Performed by: ORTHOPAEDIC SURGERY

## 2020-12-02 PROCEDURE — 74011250636 HC RX REV CODE- 250/636: Performed by: NURSE ANESTHETIST, CERTIFIED REGISTERED

## 2020-12-02 PROCEDURE — 77030011267 HC ELECTRD BLD COVD -A: Performed by: ORTHOPAEDIC SURGERY

## 2020-12-02 PROCEDURE — 74011250636 HC RX REV CODE- 250/636: Performed by: PHYSICIAN ASSISTANT

## 2020-12-02 PROCEDURE — 99218 HC RM OBSERVATION: CPT

## 2020-12-02 PROCEDURE — 2709999900 HC NON-CHARGEABLE SUPPLY: Performed by: ORTHOPAEDIC SURGERY

## 2020-12-02 PROCEDURE — 77030040922 HC BLNKT HYPOTHRM STRY -A

## 2020-12-02 PROCEDURE — 74011250637 HC RX REV CODE- 250/637: Performed by: PHYSICIAN ASSISTANT

## 2020-12-02 PROCEDURE — 74011250637 HC RX REV CODE- 250/637: Performed by: ANESTHESIOLOGY

## 2020-12-02 PROCEDURE — 77030038552 HC DRN WND MDII -A: Performed by: ORTHOPAEDIC SURGERY

## 2020-12-02 PROCEDURE — 77030038600 HC TU BPLR IRR DISP STRY -B: Performed by: ORTHOPAEDIC SURGERY

## 2020-12-02 PROCEDURE — 77030008684 HC TU ET CUF COVD -B: Performed by: ANESTHESIOLOGY

## 2020-12-02 PROCEDURE — 77030003666 HC NDL SPINAL BD -A: Performed by: ORTHOPAEDIC SURGERY

## 2020-12-02 PROCEDURE — 77030031139 HC SUT VCRL2 J&J -A: Performed by: ORTHOPAEDIC SURGERY

## 2020-12-02 PROCEDURE — 76010000162 HC OR TIME 1.5 TO 2 HR INTENSV-TIER 1: Performed by: ORTHOPAEDIC SURGERY

## 2020-12-02 PROCEDURE — 74011250636 HC RX REV CODE- 250/636: Performed by: ANESTHESIOLOGY

## 2020-12-02 PROCEDURE — 74011000250 HC RX REV CODE- 250: Performed by: PHYSICIAN ASSISTANT

## 2020-12-02 PROCEDURE — 76210000016 HC OR PH I REC 1 TO 1.5 HR: Performed by: ORTHOPAEDIC SURGERY

## 2020-12-02 PROCEDURE — 77030029099 HC BN WAX SSPC -A: Performed by: ORTHOPAEDIC SURGERY

## 2020-12-02 PROCEDURE — 77030004391 HC BUR FLUT MEDT -C: Performed by: ORTHOPAEDIC SURGERY

## 2020-12-02 PROCEDURE — 77030014650 HC SEAL MTRX FLOSEL BAXT -C: Performed by: ORTHOPAEDIC SURGERY

## 2020-12-02 PROCEDURE — 74011250636 HC RX REV CODE- 250/636

## 2020-12-02 PROCEDURE — 74011000250 HC RX REV CODE- 250: Performed by: NURSE ANESTHETIST, CERTIFIED REGISTERED

## 2020-12-02 PROCEDURE — 76060000034 HC ANESTHESIA 1.5 TO 2 HR: Performed by: ORTHOPAEDIC SURGERY

## 2020-12-02 PROCEDURE — 77030012893

## 2020-12-02 PROCEDURE — 77030020275 HC MISC ORTHOPEDIC: Performed by: ORTHOPAEDIC SURGERY

## 2020-12-02 DEVICE — Z DUP USE 2602123 GRAFT HUM TISS 3CMX 4CM AMNIO MEM VERSASHIELD: Type: IMPLANTABLE DEVICE | Site: NECK | Status: FUNCTIONAL

## 2020-12-02 DEVICE — PRESTIGE LP DISC 6X16MM
Type: IMPLANTABLE DEVICE | Site: NECK | Status: FUNCTIONAL
Brand: PRESTIGE® LP CERVICAL DISC SYSTEM

## 2020-12-02 RX ORDER — DEXAMETHASONE SODIUM PHOSPHATE 4 MG/ML
INJECTION, SOLUTION INTRA-ARTICULAR; INTRALESIONAL; INTRAMUSCULAR; INTRAVENOUS; SOFT TISSUE AS NEEDED
Status: DISCONTINUED | OUTPATIENT
Start: 2020-12-02 | End: 2020-12-02 | Stop reason: HOSPADM

## 2020-12-02 RX ORDER — ONDANSETRON 2 MG/ML
INJECTION INTRAMUSCULAR; INTRAVENOUS AS NEEDED
Status: DISCONTINUED | OUTPATIENT
Start: 2020-12-02 | End: 2020-12-02 | Stop reason: HOSPADM

## 2020-12-02 RX ORDER — HYDROMORPHONE HYDROCHLORIDE 1 MG/ML
INJECTION, SOLUTION INTRAMUSCULAR; INTRAVENOUS; SUBCUTANEOUS AS NEEDED
Status: DISCONTINUED | OUTPATIENT
Start: 2020-12-02 | End: 2020-12-02 | Stop reason: HOSPADM

## 2020-12-02 RX ORDER — SODIUM CHLORIDE 0.9 % (FLUSH) 0.9 %
5-40 SYRINGE (ML) INJECTION EVERY 8 HOURS
Status: DISCONTINUED | OUTPATIENT
Start: 2020-12-02 | End: 2020-12-02 | Stop reason: HOSPADM

## 2020-12-02 RX ORDER — MORPHINE SULFATE 2 MG/ML
2 INJECTION, SOLUTION INTRAMUSCULAR; INTRAVENOUS
Status: DISCONTINUED | OUTPATIENT
Start: 2020-12-02 | End: 2020-12-03 | Stop reason: HOSPADM

## 2020-12-02 RX ORDER — FENTANYL CITRATE 50 UG/ML
50 INJECTION, SOLUTION INTRAMUSCULAR; INTRAVENOUS AS NEEDED
Status: DISCONTINUED | OUTPATIENT
Start: 2020-12-02 | End: 2020-12-02 | Stop reason: HOSPADM

## 2020-12-02 RX ORDER — SODIUM CHLORIDE 0.9 % (FLUSH) 0.9 %
5-40 SYRINGE (ML) INJECTION AS NEEDED
Status: DISCONTINUED | OUTPATIENT
Start: 2020-12-02 | End: 2020-12-02 | Stop reason: HOSPADM

## 2020-12-02 RX ORDER — DIPHENHYDRAMINE HYDROCHLORIDE 50 MG/ML
12.5 INJECTION, SOLUTION INTRAMUSCULAR; INTRAVENOUS
Status: DISCONTINUED | OUTPATIENT
Start: 2020-12-02 | End: 2020-12-03 | Stop reason: HOSPADM

## 2020-12-02 RX ORDER — VENLAFAXINE HYDROCHLORIDE 150 MG/1
150 CAPSULE, EXTENDED RELEASE ORAL
Status: DISCONTINUED | OUTPATIENT
Start: 2020-12-03 | End: 2020-12-03 | Stop reason: HOSPADM

## 2020-12-02 RX ORDER — ACETAMINOPHEN 325 MG/1
650 TABLET ORAL ONCE
Status: COMPLETED | OUTPATIENT
Start: 2020-12-02 | End: 2020-12-02

## 2020-12-02 RX ORDER — MEMANTINE HYDROCHLORIDE 5 MG/1
5 TABLET ORAL 2 TIMES DAILY
Status: DISCONTINUED | OUTPATIENT
Start: 2020-12-03 | End: 2020-12-03 | Stop reason: HOSPADM

## 2020-12-02 RX ORDER — FACIAL-BODY WIPES
10 EACH TOPICAL DAILY PRN
Status: DISCONTINUED | OUTPATIENT
Start: 2020-12-04 | End: 2020-12-03 | Stop reason: HOSPADM

## 2020-12-02 RX ORDER — FENTANYL CITRATE 50 UG/ML
25 INJECTION, SOLUTION INTRAMUSCULAR; INTRAVENOUS
Status: DISCONTINUED | OUTPATIENT
Start: 2020-12-02 | End: 2020-12-02 | Stop reason: HOSPADM

## 2020-12-02 RX ORDER — AMOXICILLIN 250 MG
1 CAPSULE ORAL 2 TIMES DAILY
Status: DISCONTINUED | OUTPATIENT
Start: 2020-12-02 | End: 2020-12-03 | Stop reason: HOSPADM

## 2020-12-02 RX ORDER — LIDOCAINE HYDROCHLORIDE 20 MG/ML
INJECTION, SOLUTION EPIDURAL; INFILTRATION; INTRACAUDAL; PERINEURAL AS NEEDED
Status: DISCONTINUED | OUTPATIENT
Start: 2020-12-02 | End: 2020-12-02 | Stop reason: HOSPADM

## 2020-12-02 RX ORDER — FENTANYL CITRATE 50 UG/ML
INJECTION, SOLUTION INTRAMUSCULAR; INTRAVENOUS AS NEEDED
Status: DISCONTINUED | OUTPATIENT
Start: 2020-12-02 | End: 2020-12-02 | Stop reason: HOSPADM

## 2020-12-02 RX ORDER — OXYCODONE HYDROCHLORIDE 5 MG/1
5 TABLET ORAL
Status: DISCONTINUED | OUTPATIENT
Start: 2020-12-02 | End: 2020-12-03 | Stop reason: HOSPADM

## 2020-12-02 RX ORDER — HYDROMORPHONE HYDROCHLORIDE 1 MG/ML
INJECTION, SOLUTION INTRAMUSCULAR; INTRAVENOUS; SUBCUTANEOUS
Status: COMPLETED
Start: 2020-12-02 | End: 2020-12-02

## 2020-12-02 RX ORDER — FLUTICASONE PROPIONATE 50 MCG
1 SPRAY, SUSPENSION (ML) NASAL 2 TIMES DAILY
Status: DISCONTINUED | OUTPATIENT
Start: 2020-12-02 | End: 2020-12-03 | Stop reason: HOSPADM

## 2020-12-02 RX ORDER — PSEUDOEPHEDRINE HCL 30 MG
30 TABLET ORAL
Status: DISCONTINUED | OUTPATIENT
Start: 2020-12-02 | End: 2020-12-03 | Stop reason: HOSPADM

## 2020-12-02 RX ORDER — LANOLIN ALCOHOL/MO/W.PET/CERES
400 CREAM (GRAM) TOPICAL
Status: DISCONTINUED | OUTPATIENT
Start: 2020-12-02 | End: 2020-12-03 | Stop reason: HOSPADM

## 2020-12-02 RX ORDER — KETAMINE HYDROCHLORIDE 10 MG/ML
INJECTION, SOLUTION INTRAMUSCULAR; INTRAVENOUS AS NEEDED
Status: DISCONTINUED | OUTPATIENT
Start: 2020-12-02 | End: 2020-12-02 | Stop reason: HOSPADM

## 2020-12-02 RX ORDER — ACETAMINOPHEN 500 MG
1000 TABLET ORAL EVERY 6 HOURS
Status: DISCONTINUED | OUTPATIENT
Start: 2020-12-02 | End: 2020-12-03 | Stop reason: HOSPADM

## 2020-12-02 RX ORDER — MIDAZOLAM HYDROCHLORIDE 1 MG/ML
INJECTION, SOLUTION INTRAMUSCULAR; INTRAVENOUS AS NEEDED
Status: DISCONTINUED | OUTPATIENT
Start: 2020-12-02 | End: 2020-12-02 | Stop reason: HOSPADM

## 2020-12-02 RX ORDER — OXYCODONE HYDROCHLORIDE 5 MG/1
10 TABLET ORAL
Status: DISCONTINUED | OUTPATIENT
Start: 2020-12-02 | End: 2020-12-03 | Stop reason: HOSPADM

## 2020-12-02 RX ORDER — MONTELUKAST SODIUM 10 MG/1
10 TABLET ORAL DAILY
Status: DISCONTINUED | OUTPATIENT
Start: 2020-12-03 | End: 2020-12-03 | Stop reason: HOSPADM

## 2020-12-02 RX ORDER — LANOLIN ALCOHOL/MO/W.PET/CERES
4.5 CREAM (GRAM) TOPICAL
Status: DISCONTINUED | OUTPATIENT
Start: 2020-12-02 | End: 2020-12-03 | Stop reason: HOSPADM

## 2020-12-02 RX ORDER — CETIRIZINE HCL 10 MG
10 TABLET ORAL DAILY
Status: DISCONTINUED | OUTPATIENT
Start: 2020-12-03 | End: 2020-12-03 | Stop reason: HOSPADM

## 2020-12-02 RX ORDER — SODIUM CHLORIDE 0.9 % (FLUSH) 0.9 %
5-40 SYRINGE (ML) INJECTION AS NEEDED
Status: DISCONTINUED | OUTPATIENT
Start: 2020-12-02 | End: 2020-12-03 | Stop reason: HOSPADM

## 2020-12-02 RX ORDER — SODIUM CHLORIDE 9 MG/ML
125 INJECTION, SOLUTION INTRAVENOUS CONTINUOUS
Status: DISCONTINUED | OUTPATIENT
Start: 2020-12-02 | End: 2020-12-03 | Stop reason: HOSPADM

## 2020-12-02 RX ORDER — ROCURONIUM BROMIDE 10 MG/ML
INJECTION, SOLUTION INTRAVENOUS AS NEEDED
Status: DISCONTINUED | OUTPATIENT
Start: 2020-12-02 | End: 2020-12-02 | Stop reason: HOSPADM

## 2020-12-02 RX ORDER — LIDOCAINE HYDROCHLORIDE 10 MG/ML
0.1 INJECTION, SOLUTION EPIDURAL; INFILTRATION; INTRACAUDAL; PERINEURAL AS NEEDED
Status: DISCONTINUED | OUTPATIENT
Start: 2020-12-02 | End: 2020-12-02 | Stop reason: HOSPADM

## 2020-12-02 RX ORDER — ONDANSETRON 2 MG/ML
4 INJECTION INTRAMUSCULAR; INTRAVENOUS AS NEEDED
Status: DISCONTINUED | OUTPATIENT
Start: 2020-12-02 | End: 2020-12-02 | Stop reason: HOSPADM

## 2020-12-02 RX ORDER — SUCCINYLCHOLINE CHLORIDE 20 MG/ML
INJECTION INTRAMUSCULAR; INTRAVENOUS AS NEEDED
Status: DISCONTINUED | OUTPATIENT
Start: 2020-12-02 | End: 2020-12-02 | Stop reason: HOSPADM

## 2020-12-02 RX ORDER — PROPOFOL 10 MG/ML
INJECTION, EMULSION INTRAVENOUS
Status: DISCONTINUED | OUTPATIENT
Start: 2020-12-02 | End: 2020-12-02 | Stop reason: HOSPADM

## 2020-12-02 RX ORDER — SODIUM CHLORIDE 0.9 % (FLUSH) 0.9 %
5-40 SYRINGE (ML) INJECTION EVERY 8 HOURS
Status: DISCONTINUED | OUTPATIENT
Start: 2020-12-02 | End: 2020-12-03 | Stop reason: HOSPADM

## 2020-12-02 RX ORDER — SODIUM CHLORIDE, SODIUM LACTATE, POTASSIUM CHLORIDE, CALCIUM CHLORIDE 600; 310; 30; 20 MG/100ML; MG/100ML; MG/100ML; MG/100ML
50 INJECTION, SOLUTION INTRAVENOUS CONTINUOUS
Status: DISCONTINUED | OUTPATIENT
Start: 2020-12-02 | End: 2020-12-02 | Stop reason: HOSPADM

## 2020-12-02 RX ORDER — HYDROMORPHONE HYDROCHLORIDE 1 MG/ML
0.2 INJECTION, SOLUTION INTRAMUSCULAR; INTRAVENOUS; SUBCUTANEOUS
Status: DISCONTINUED | OUTPATIENT
Start: 2020-12-02 | End: 2020-12-02 | Stop reason: HOSPADM

## 2020-12-02 RX ORDER — ALBUTEROL SULFATE 0.83 MG/ML
2.5 SOLUTION RESPIRATORY (INHALATION)
Status: DISCONTINUED | OUTPATIENT
Start: 2020-12-02 | End: 2020-12-03 | Stop reason: HOSPADM

## 2020-12-02 RX ORDER — AMLODIPINE BESYLATE 5 MG/1
5 TABLET ORAL DAILY
Status: DISCONTINUED | OUTPATIENT
Start: 2020-12-03 | End: 2020-12-03 | Stop reason: HOSPADM

## 2020-12-02 RX ORDER — PROPOFOL 10 MG/ML
INJECTION, EMULSION INTRAVENOUS AS NEEDED
Status: DISCONTINUED | OUTPATIENT
Start: 2020-12-02 | End: 2020-12-02 | Stop reason: HOSPADM

## 2020-12-02 RX ORDER — LISINOPRIL 20 MG/1
20 TABLET ORAL DAILY
Status: DISCONTINUED | OUTPATIENT
Start: 2020-12-03 | End: 2020-12-03 | Stop reason: HOSPADM

## 2020-12-02 RX ORDER — NALOXONE HYDROCHLORIDE 0.4 MG/ML
0.4 INJECTION, SOLUTION INTRAMUSCULAR; INTRAVENOUS; SUBCUTANEOUS AS NEEDED
Status: DISCONTINUED | OUTPATIENT
Start: 2020-12-02 | End: 2020-12-03 | Stop reason: HOSPADM

## 2020-12-02 RX ORDER — POLYETHYLENE GLYCOL 3350 17 G/17G
17 POWDER, FOR SOLUTION ORAL DAILY
Status: DISCONTINUED | OUTPATIENT
Start: 2020-12-03 | End: 2020-12-03 | Stop reason: HOSPADM

## 2020-12-02 RX ORDER — ONDANSETRON 2 MG/ML
4 INJECTION INTRAMUSCULAR; INTRAVENOUS
Status: DISCONTINUED | OUTPATIENT
Start: 2020-12-02 | End: 2020-12-03 | Stop reason: HOSPADM

## 2020-12-02 RX ORDER — MIDAZOLAM HYDROCHLORIDE 1 MG/ML
1 INJECTION, SOLUTION INTRAMUSCULAR; INTRAVENOUS AS NEEDED
Status: DISCONTINUED | OUTPATIENT
Start: 2020-12-02 | End: 2020-12-02 | Stop reason: HOSPADM

## 2020-12-02 RX ADMIN — ROCURONIUM BROMIDE 10 MG: 10 SOLUTION INTRAVENOUS at 10:30

## 2020-12-02 RX ADMIN — KETAMINE HYDROCHLORIDE 30 MG: 10 INJECTION, SOLUTION INTRAMUSCULAR; INTRAVENOUS at 10:30

## 2020-12-02 RX ADMIN — SODIUM CHLORIDE, POTASSIUM CHLORIDE, SODIUM LACTATE AND CALCIUM CHLORIDE: 600; 310; 30; 20 INJECTION, SOLUTION INTRAVENOUS at 09:50

## 2020-12-02 RX ADMIN — ACETAMINOPHEN 650 MG: 325 TABLET ORAL at 09:40

## 2020-12-02 RX ADMIN — HYDROMORPHONE HYDROCHLORIDE 0.2 MG: 1 INJECTION, SOLUTION INTRAMUSCULAR; INTRAVENOUS; SUBCUTANEOUS at 12:46

## 2020-12-02 RX ADMIN — HYDROMORPHONE HYDROCHLORIDE 0.2 MG: 1 INJECTION, SOLUTION INTRAMUSCULAR; INTRAVENOUS; SUBCUTANEOUS at 12:56

## 2020-12-02 RX ADMIN — OXYCODONE HYDROCHLORIDE 10 MG: 5 TABLET ORAL at 20:34

## 2020-12-02 RX ADMIN — HYDROMORPHONE HYDROCHLORIDE 0.2 MG: 1 INJECTION, SOLUTION INTRAMUSCULAR; INTRAVENOUS; SUBCUTANEOUS at 13:26

## 2020-12-02 RX ADMIN — FENTANYL CITRATE 100 MCG: 50 INJECTION, SOLUTION INTRAMUSCULAR; INTRAVENOUS at 10:30

## 2020-12-02 RX ADMIN — HYDROMORPHONE HYDROCHLORIDE 0.2 MG: 1 INJECTION, SOLUTION INTRAMUSCULAR; INTRAVENOUS; SUBCUTANEOUS at 13:16

## 2020-12-02 RX ADMIN — Medication 5 ML: at 15:00

## 2020-12-02 RX ADMIN — PROPOFOL 50 MG: 10 INJECTION, EMULSION INTRAVENOUS at 10:46

## 2020-12-02 RX ADMIN — DOCUSATE SODIUM 50 MG AND SENNOSIDES 8.6 MG 1 TABLET: 8.6; 5 TABLET, FILM COATED ORAL at 18:33

## 2020-12-02 RX ADMIN — CEFAZOLIN SODIUM 2 G: 1 INJECTION, POWDER, FOR SOLUTION INTRAMUSCULAR; INTRAVENOUS at 18:33

## 2020-12-02 RX ADMIN — MIDAZOLAM 2 MG: 1 INJECTION INTRAMUSCULAR; INTRAVENOUS at 10:26

## 2020-12-02 RX ADMIN — Medication 400 MG: at 21:46

## 2020-12-02 RX ADMIN — ACETAMINOPHEN 1000 MG: 500 TABLET ORAL at 18:33

## 2020-12-02 RX ADMIN — PROPOFOL 150 MG: 10 INJECTION, EMULSION INTRAVENOUS at 10:30

## 2020-12-02 RX ADMIN — PHENYLEPHRINE HYDROCHLORIDE 40 MCG/MIN: 10 INJECTION INTRAVENOUS at 10:35

## 2020-12-02 RX ADMIN — SUCCINYLCHOLINE CHLORIDE 140 MG: 20 INJECTION, SOLUTION INTRAMUSCULAR; INTRAVENOUS at 10:30

## 2020-12-02 RX ADMIN — OXYCODONE HYDROCHLORIDE 10 MG: 5 TABLET ORAL at 16:51

## 2020-12-02 RX ADMIN — SODIUM CHLORIDE 125 ML/HR: 900 INJECTION, SOLUTION INTRAVENOUS at 17:02

## 2020-12-02 RX ADMIN — SODIUM CHLORIDE 125 ML/HR: 900 INJECTION, SOLUTION INTRAVENOUS at 13:04

## 2020-12-02 RX ADMIN — Medication 10 ML: at 21:46

## 2020-12-02 RX ADMIN — PROPOFOL 125 MCG/KG/MIN: 10 INJECTION, EMULSION INTRAVENOUS at 10:35

## 2020-12-02 RX ADMIN — Medication 4.5 MG: at 21:46

## 2020-12-02 RX ADMIN — ONDANSETRON HYDROCHLORIDE 4 MG: 2 INJECTION, SOLUTION INTRAMUSCULAR; INTRAVENOUS at 12:00

## 2020-12-02 RX ADMIN — WATER 2 G: 1 INJECTION INTRAMUSCULAR; INTRAVENOUS; SUBCUTANEOUS at 10:53

## 2020-12-02 RX ADMIN — LIDOCAINE HYDROCHLORIDE 100 MG: 20 INJECTION, SOLUTION EPIDURAL; INFILTRATION; INTRACAUDAL; PERINEURAL at 10:30

## 2020-12-02 RX ADMIN — HYDROMORPHONE HYDROCHLORIDE 0.5 MG: 1 INJECTION, SOLUTION INTRAMUSCULAR; INTRAVENOUS; SUBCUTANEOUS at 12:00

## 2020-12-02 RX ADMIN — FLUTICASONE PROPIONATE 1 SPRAY: 50 SPRAY, METERED NASAL at 18:33

## 2020-12-02 RX ADMIN — DEXAMETHASONE SODIUM PHOSPHATE 8 MG: 4 INJECTION, SOLUTION INTRAMUSCULAR; INTRAVENOUS at 10:38

## 2020-12-02 RX ADMIN — HYDROMORPHONE HYDROCHLORIDE 0.5 MG: 1 INJECTION, SOLUTION INTRAMUSCULAR; INTRAVENOUS; SUBCUTANEOUS at 10:50

## 2020-12-02 RX ADMIN — HYDROMORPHONE HYDROCHLORIDE 0.2 MG: 1 INJECTION, SOLUTION INTRAMUSCULAR; INTRAVENOUS; SUBCUTANEOUS at 13:06

## 2020-12-02 NOTE — PROGRESS NOTES
Care Management Interventions  PCP Verified by CM: Yes(Dr Elena)  Last Visit to PCP: 11/18/20  Palliative Care Criteria Met (RRAT>21 & CHF Dx)?: No  MyChart Signup: No  Discharge Durable Medical Equipment: No  Physical Therapy Consult: Yes  Occupational Therapy Consult: Yes  Speech Therapy Consult: No  Current Support Network: Lives with Spouse   CRM met with patient and his wife Daniella Monterroso  [phone]  Patient uses the [facility] at the [facility] for his medications. He does not have an ACPand declined additional information. 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 She will provide transportation home. Patient expects to be discharged in late am of 12/3/2020.

## 2020-12-02 NOTE — PROGRESS NOTES
TRANSFER - IN REPORT:    Verbal report received from [facility] (name) on Lili Khan  being received from PACU (unit) for routine post - op      Report consisted of patients Situation, Background, Assessment and   Recommendations(SBAR). Information from the following report(s) SBAR, Kardex, OR Summary, Procedure Summary, Intake/Output and MAR was reviewed with the receiving nurse. Opportunity for questions and clarification was provided. Assessment completed upon patients arrival to unit and care assumed.

## 2020-12-02 NOTE — OP NOTES
[address]   OPERATIVE REPORT    Name:  Cliffton Bumpers  MR#:  [id number]  :  1970  ACCOUNT #:  [de-identified]  DATE OF SERVICE:  2020    PREOPERATIVE DIAGNOSES:  Cervical spondylosis, cervical stenosis, cervical degenerative disease, and chronic neck pain at C5-6. POSTOPERATIVE DIAGNOSES:  Cervical spondylosis, cervical stenosis, cervical degenerative disease, and chronic neck pain at C5-6. PROCEDURES PERFORMED:  Anterior cervical diskectomy C5-6 with anterior cervical disk arthroplasty C5-6. SURGEON:  Marybel Razo MD    ASSISTANT:  Dejuan Foster PA-C    ANESTHESIA:  General anesthesia. COMPLICATIONS:  None. SPECIMENS REMOVED:  None    IMPLANTS:  Medtronic Prestige XP. ESTIMATED BLOOD LOSS:  Minimal.    INDICATIONS:  This is a pleasant 69-year-old gentleman with chronic history of multiple histories of migraine, neck pain, bilateral arm numbness and tingling with multiple concussions. He has had cervical spondylosis at C5-6 with foraminal stenosis that has contributed to this. He has failed conservative treatment, understood the risks and benefits and elected to proceed. PROCEDURE:  The patient was identified, brought to the operative suite, underwent general anesthesia without difficulty. 2 g of Ancef was given to the patient preoperatively. Preoperative neuromonitoring was placed, baselines obtained, and these remained stable throughout the surgical procedure. Neck was prepped and draped sterilely. Neck was placed in a neutral position with 10 pounds of traction across the neck. After prepping and draping, x-rays brought in, with lateral fluoroscopy imaging, we were able to farzaneh the skin incision for the C5-6 level. Dissection was carried down after a sharp incision.   Platysma was split longitudinally, blunt dissection in medial sternocleidomastoid down to the deep cervical fascia, at which time we bluntly dissected this off and took an x-ray to confirm the appropriate level. Longus colli were elevated and deep radiolucent retractors were placed. Annulotomy was performed followed by complete diskectomy and removal of the cartilaginous endplates with pituitary rongeurs and curved curettes. Cartilage had been removed. Posterior longitudinal ligament was elevated and was resected with a #1 and #2 Kerrison as well as posterior vertebral osteophytes. We did bilateral foraminotomies undercutting the uncinate processes with #1 and #2 Kerrison. After satisfactory decompression, hemostasis was with bipolar cautery. We did trial spacers with Medtronic Prestige XP implant. A 6 x 16 mm trial was placed. This provided good anterior column anterior posterior coverage with coverage of the endplates. The real drill guide cutter was then placed followed by the real cutter under live fluoroscopy imaging. The trials were then cleared of bone tissue. Hemostasis was with bipolar cautery and FloSeal.  Medtronic XP implant 6 x 16 mm was then loaded and impacted in place under live fluoroscopy. Neuromonitoring was stable. FloSeal for hemostasis as well as bipolar cautery. 0 Vicryl on the fascial layer, 2-0 Vicryl on the subcutaneous layer, and 3-0 Vicryl on the skin. Final x-rays demonstrated stable fixation as well. The physician assistant was present during the entire operative procedure and assisted in all critical elements of the surgery. No surgical assist or resident was available.      Bailey Plasencia MD      [id number]  D:  12/02/2020 12:06  T:  12/02/2020 16:21  JOB #:  [id number]

## 2020-12-02 NOTE — ANESTHESIA POSTPROCEDURE EVALUATION
Post-Anesthesia Evaluation and Assessment    Patient: Lili Khan MRN: [id number]  SSN: [id number]    YOB: 1970  Age: 45 y.o. Sex: male      I have evaluated the patient and they are stable and ready for discharge from the PACU. Cardiovascular Function/Vital Signs  Visit Vitals  BP (!) 142/93   Pulse 100   Temp 36.4 °C (97.6 °F)   Resp 18   Ht 5' 11\" (1.803 m)   Wt 104.8 kg (231 lb 0.7 oz)   SpO2 98%   BMI 32.22 kg/m²       Patient is status post General anesthesia for Procedure(s):  C5-6 DISC ARTHROPLASTY. Nausea/Vomiting: None    Postoperative hydration reviewed and adequate. Pain:  Pain Scale 1: Numeric (0 - 10) (12/02/20 1358)  Pain Intensity 1: 7 (12/02/20 1358)   Managed    Neurological Status:   Neuro (WDL): Within Defined Limits (12/02/20 1245)  Neuro  Neurologic State: Alert (12/02/20 1245)  LUE Motor Response: (P) Purposeful (12/02/20 1358)  LLE Motor Response: (P) Purposeful (12/02/20 1358)  RUE Motor Response: (P) Purposeful (12/02/20 1358)  RLE Motor Response: (P) Purposeful (12/02/20 1358)   At baseline    Mental Status, Level of Consciousness: Alert and  oriented to person, place, and time    Pulmonary Status:   O2 Device: Nasal cannula (12/02/20 1358)   Adequate oxygenation and airway patent    Complications related to anesthesia: None    Post-anesthesia assessment completed.  No concerns    Signed By: Stas Avalos MD     December 2, 2020

## 2020-12-02 NOTE — PERIOP NOTES
TRANSFER - OUT REPORT:    Verbal report given to Wendy DAVIS on [employer]  being transferred to room 536 for routine post - op       Report consisted of patients Situation, Background, Assessment and   Recommendations(SBAR). Time Pre op antibiotic given:  1053  Anesthesia Stop time:  8579  Michel Present on Transfer to floor: NO  Order for Michel on Chart: N/a    Information from the following report(s) SBAR and MAR was reviewed with the receiving nurse. Opportunity for questions and clarification was provided. Is the patient on 02? YES       L/Min 2       Other     Is the patient on a monitor? NO    Is the nurse transporting with the patient? NO    Surgical Waiting Area notified of patient's transfer from PACU? -no answer    Lines:   Peripheral IV 12/02/20 Left Forearm (Active)   Site Assessment Clean, dry, & intact 12/02/20 1215   Phlebitis Assessment 0 12/02/20 1300   Infiltration Assessment 0 12/02/20 1300   Dressing Status Clean, dry, & intact 12/02/20 1215   Dressing Type Transparent 12/02/20 1215   Hub Color/Line Status Infusing 12/02/20 1300        The following personal items collected during your admission accompanied patient upon transfer:   Dental Appliance: Dental Appliances: None  Vision: Visual Aid: Glasses  Hearing Aid:    Jewelry: Jewelry: None  Clothing: Clothing: Other (comment)(CLOTHING & SHOES TO PACU)  Other Valuables:  Other Valuables: None  Valuables sent to safe:

## 2020-12-02 NOTE — PROGRESS NOTES
Problem: Simple Spine Procedure:  Day of Surgery  Goal: Nutrition/Diet  Outcome: Progressing Towards Goal  Goal: Respiratory  Outcome: Progressing Towards Goal

## 2020-12-03 VITALS
BODY MASS INDEX: 32.35 KG/M2 | DIASTOLIC BLOOD PRESSURE: 76 MMHG | OXYGEN SATURATION: 98 % | RESPIRATION RATE: 16 BRPM | HEART RATE: 80 BPM | SYSTOLIC BLOOD PRESSURE: 128 MMHG | TEMPERATURE: 98.1 F | HEIGHT: 71 IN | WEIGHT: 231.04 LBS

## 2020-12-03 PROCEDURE — 74011250636 HC RX REV CODE- 250/636: Performed by: PHYSICIAN ASSISTANT

## 2020-12-03 PROCEDURE — 99218 HC RM OBSERVATION: CPT

## 2020-12-03 PROCEDURE — 74011000250 HC RX REV CODE- 250: Performed by: PHYSICIAN ASSISTANT

## 2020-12-03 PROCEDURE — 74011250637 HC RX REV CODE- 250/637: Performed by: PHYSICIAN ASSISTANT

## 2020-12-03 RX ORDER — OXYCODONE HYDROCHLORIDE 5 MG/1
5 TABLET ORAL
Qty: 20 TAB | Refills: 0 | Status: SHIPPED | OUTPATIENT
Start: 2020-12-03 | End: 2020-12-17

## 2020-12-03 RX ORDER — DICLOFENAC SODIUM 75 MG/1
75 TABLET, DELAYED RELEASE ORAL 2 TIMES DAILY
Qty: 60 TAB | Refills: 0 | Status: SHIPPED | OUTPATIENT
Start: 2020-12-03 | End: 2021-01-02

## 2020-12-03 RX ADMIN — VENLAFAXINE HYDROCHLORIDE 150 MG: 150 CAPSULE, EXTENDED RELEASE ORAL at 08:08

## 2020-12-03 RX ADMIN — ACETAMINOPHEN 1000 MG: 500 TABLET ORAL at 11:20

## 2020-12-03 RX ADMIN — OXYCODONE HYDROCHLORIDE 10 MG: 5 TABLET ORAL at 11:20

## 2020-12-03 RX ADMIN — MONTELUKAST 10 MG: 10 TABLET, FILM COATED ORAL at 09:22

## 2020-12-03 RX ADMIN — POLYETHYLENE GLYCOL 3350 17 G: 17 POWDER, FOR SOLUTION ORAL at 09:22

## 2020-12-03 RX ADMIN — CEFAZOLIN SODIUM 2 G: 1 INJECTION, POWDER, FOR SOLUTION INTRAMUSCULAR; INTRAVENOUS at 03:38

## 2020-12-03 RX ADMIN — DOCUSATE SODIUM 50 MG AND SENNOSIDES 8.6 MG 1 TABLET: 8.6; 5 TABLET, FILM COATED ORAL at 09:22

## 2020-12-03 RX ADMIN — LISINOPRIL 20 MG: 20 TABLET ORAL at 09:22

## 2020-12-03 RX ADMIN — SODIUM CHLORIDE 125 ML/HR: 900 INJECTION, SOLUTION INTRAVENOUS at 00:07

## 2020-12-03 RX ADMIN — Medication 10 ML: at 06:09

## 2020-12-03 RX ADMIN — ACETAMINOPHEN 1000 MG: 500 TABLET ORAL at 06:08

## 2020-12-03 RX ADMIN — FLUTICASONE PROPIONATE 1 SPRAY: 50 SPRAY, METERED NASAL at 09:28

## 2020-12-03 RX ADMIN — MEMANTINE 5 MG: 5 TABLET ORAL at 09:30

## 2020-12-03 RX ADMIN — OXYCODONE HYDROCHLORIDE 5 MG: 5 TABLET ORAL at 00:03

## 2020-12-03 RX ADMIN — CETIRIZINE HYDROCHLORIDE 10 MG: 10 TABLET, FILM COATED ORAL at 09:22

## 2020-12-03 RX ADMIN — ACETAMINOPHEN 1000 MG: 500 TABLET ORAL at 00:03

## 2020-12-03 RX ADMIN — AMLODIPINE BESYLATE 5 MG: 5 TABLET ORAL at 09:22

## 2020-12-03 NOTE — PROGRESS NOTES
Problem: Falls - Risk of  Goal: *Absence of Falls  Description: Document Ruba Herrera Fall Risk and appropriate interventions in the flowsheet.   Outcome: Progressing Towards Goal  Note: Fall Risk Interventions:  Mobility Interventions: Communicate number of staff needed for ambulation/transfer         Medication Interventions: Evaluate medications/consider consulting pharmacy, Patient to call before getting OOB    Elimination Interventions: Call light in reach    History of Falls Interventions: Door open when patient unattended, Evaluate medications/consider consulting pharmacy         Problem: Patient Education: Go to Patient Education Activity  Goal: Patient/Family Education  Outcome: Progressing Towards Goal     Problem: Simple Spine Procedure:  Day of Surgery  Goal: Off Pathway (Use only if patient is Off Pathway)  Outcome: Progressing Towards Goal  Goal: Activity/Safety  Outcome: Progressing Towards Goal  Goal: Consults, if ordered  Outcome: Progressing Towards Goal  Goal: Nutrition/Diet  Outcome: Progressing Towards Goal  Goal: Discharge Planning  Outcome: Progressing Towards Goal  Goal: Medications  Outcome: Progressing Towards Goal  Goal: Respiratory  Outcome: Progressing Towards Goal  Goal: Treatments/Interventions/Procedures  Outcome: Progressing Towards Goal  Goal: Psychosocial  Outcome: Progressing Towards Goal  Goal: *Verbalizes understanding of type and use of pain medication  Outcome: Progressing Towards Goal  Goal: *Optimal pain control at patient's stated goal  Outcome: Progressing Towards Goal  Goal: *Verbalizes/demonstrates understanding of proper body mechanics and use of stabilization device if ordered  Outcome: Progressing Towards Goal  Goal: *Activity level attained as ordered  Outcome: Progressing Towards Goal  Goal: *Active bowel sounds  Outcome: Progressing Towards Goal  Goal: *Respiratory status stable  Outcome: Progressing Towards Goal  Goal: *Adequate urinary output  Outcome: Progressing Towards Goal  Goal: *Hemodynamically stable  Outcome: Progressing Towards Goal     Problem: Simple Spine Procedure:  Post Op Day 1/Day of Discharge  Goal: Off Pathway (Use only if patient is Off Pathway)  Outcome: Progressing Towards Goal  Goal: Activity/Safety  Outcome: Progressing Towards Goal  Goal: Nutrition/Diet  Outcome: Progressing Towards Goal  Goal: Discharge Planning  Outcome: Progressing Towards Goal  Goal: Medications  Outcome: Progressing Towards Goal  Goal: Respiratory  Outcome: Progressing Towards Goal  Goal: Treatments/Interventions/Procedures  Outcome: Progressing Towards Goal  Goal: Psychosocial  Outcome: Progressing Towards Goal     Problem: Simple Spine Procedure: Discharge Outcomes  Goal: *Optimal pain control at patient's stated goal  Outcome: Progressing Towards Goal  Goal: *Demonstrates ability to place and remove stabilization device  Outcome: Progressing Towards Goal  Goal: *Progress independence mobility/activities (eg: Mobility precautions)  Outcome: Progressing Towards Goal  Goal: *Resumes normal function of bladder and bowel  Outcome: Progressing Towards Goal  Goal: *Lungs clear or at baseline  Outcome: Progressing Towards Goal  Goal: *Verbalizes name, dosage, time, side effects, and number of days to continue medications  Outcome: Progressing Towards Goal  Goal: *Modified independence with transfers, ambulation on levels with assistance devices, stair climbing, ADL's  Outcome: Progressing Towards Goal  Goal: *Describes follow-up/return visits to physicians  Outcome: Progressing Towards Goal  Goal: *Describes available resources and support systems  Outcome: Progressing Towards Goal  Goal: *Labs within defined limits  Outcome: Progressing Towards Goal  Goal: *Tolerating diet  Outcome: Progressing Towards Goal

## 2020-12-03 NOTE — DISCHARGE INSTRUCTIONS
After Hospital Care Plan:  Discharge Instructions Cervical (Neck) Spine Surgery Dr. Donna Mendoza    Patient Name: Zia Montes    Date of procedure: 12/2/2020  Date of discharge: 12/3/2020    Procedure: Procedure(s):  C5-6 [address] Ron ARTHROPLASTY  PCP: Tori Shetty MD    Follow up appointments   -follow up with Dr. Donna Mendoza in 2 weeks. Call [phone] to make an appointment as soon as you get home from the hospital.    When to call your Orthopaedic Surgeon:  -Difficulty swallowing that is worse than when you left the hospital.  -Signs of infection-if your incision is red; continues to have drainage; drainage has a foul odor or if you have a persistent fever over 101 degrees for 24 hours  -nausea or vomiting, severe headache  -changes in sensation in your arms or legs (numbness, tingling, loss of color)  -increased weakness-greater than before your surgery  -severe pain or pain not relieved by medications  -Signs of a blood clot in your leg-calf pain, tenderness, redness, swelling of lower leg    When to call your Primary Care Physician:  -Concerns about medical conditions such as diabetes, high blood pressure, asthma, congestive heart failure  -Call if blood sugars are elevated, persistent headache or dizziness, coughing or congestion, constipation or diarrhea, burning with urination, abnormal heart rate    When to call 911 and go to the nearest emergency room:  -acute onset of chest pain, shortness of breath, difficulty breathing    Activity  - You are going home a well person, be as active as possible. Your only exercise should be walking. Start with short frequent walks and increase your walking distance each day.  -Limit the amount of time you sit to 20-30 minute intervals. Sitting for prolonged periods of time will be uncomfortable for you following surgery.   - Do NOT lift anything over 5 pounds  -From now on, even when lifting light weight, bend with your knees and not your back.  -Do NOT do any neck exercises until you have been instructed by your doctor  -When you are in bed, you may lay on your back or on either side. Do NOT lie on your stomach    Cervical Collar (Aspen Collar)  -You are required to wear your cervical collar at all times; except when showering. You may remove the collar long enough to change the pads when needed and to change your dressing each day. -Do not bend or twist when your collar is off. It is best to have someone assist you when changing the pads and your dressing to prevent you from bending your neck. - Clean the pads on your neck collar every day by hand washing with a mild soap and water. Pat them dry with a towel and lay out to air dry. Do not use heat to dry the pads. Driving  -You may not drive or return to work until instructed by your physician. However, you may ride in the car for short periods of time. Incision Care  Your incision has been closed with absorbable sutures and the Zipline skin closure system. This will assist with healing. The Wilhemenia Kelly is to remain on your incision for 2 weeks. A dry dressing (ABD and tape) will be placed over it and should be changed daily, for at least the first several days after your surgery. If you have no incisional drainage, you may leave the incision open to air if you wish, still leaving the Zipline in place. Please make sure to wash your hands prior to touching your dressing. You may take brief showers but do not run the water directly onto the wound. After your shower, blot your incision dry with a soft towel and replace the dry dressing. Do not allow the tape to come in contact with the Zipline. Do not rub or apply any lotions or ointments to your incision site. Do not soak or scrub your wound. The Zipline dressing will be removed during your two week follow-up appointment.  If you experience drainage leaking from underneath the Zipline or if it peels off before 2 weeks, please contact your orthopedic surgeons office. Showering  -You may shower in approximately 2 days after your surgery.    -Leave the dressing on during your shower. Do NOT allow the water to run directly onto your dressing. Once you get out of the shower, put on a dry dressing.  -Reminder- Make sure you put clean pads on your collar after your shower.    -Do not take a tub bath. Preventing blood clots  -You have been given T.E.D. stockings to wear. Continue to wear these for 7 days after your discharge. Put them on in the morning and take them off at night.    -They are used to increase your circulation and prevent blood clots from forming in your legs  -T. E.D. stockings can be machine washed, temperature not to exceed 160° F (71°C) and machine dried for 15 to 20 minutes, temperature not to exceed 250° F (121°C). Pain management  -Take pain medication as prescribed; decrease the amount you use as your pain lessens  -Do not wait until you are in extreme pain to take your medication.  -Avoid alcoholic beverages while taking pain medication    Pain Medication Safety  DO:  -Read the Medication Guide   -Take your medicine exactly as prescribed   -Store your medicine away from children and in a safe place   -Flush unused medicine down the toilet   -Call your healthcare provider for medical advice about side effects. You may report side effects to FDA at 8-425-FDA-3715.   -Please be aware that many medications contain Tylenol. We do not want you to over medicate so please read the information below as a guide. Do not take more than 4 Grams of Tylenol in a 24 hour period.   (There are 1000 milligrams in one Gram)                                                                                                                                                                                                    Percocet contains 325 mg of Tylenol per tablet (do not take more than 12 tablets in 24 hours)  Lortab contains 500 mg of Tylenol per tablet (do not take more than 8 tablets in 24 hours)  Norco contains 325 mg of Tylenol per tablet (do not take more than 12 tablets in 24 hours). DO NOT:  -Do not give your medicine to others   -Do not take medicine unless it was prescribed for you   -Do not stop taking your medicine without talking to your healthcare provider   -Do not break, chew, crush, dissolve, or inject your medicine. If you cannot  swallow your medicine whole, talk to your healthcare provider.  -Do not drink alcohol while taking this medicine  -Do not take anti-inflammatory medications or aspirin unless instructed by your     Physician. Diet  -resume usual diet; drink plenty of fluids; eat foods high in fiber  -It is important to have regular bowel movements. Pain medications may cause constipation. You may want to take a stool softener (such as Senokot-S or Colace) to prevent constipation. If constipation occurs, take a laxative (such as Dulcolax tablets, Milk of Magnesia, or a suppository). Laxatives should only be used if the above preventable measures have failed and you still have not had a bowel movement after three days.

## 2020-12-03 NOTE — PROGRESS NOTES
Spine Surgery Progress Note  Shawnie Leventhal, PA-C      Admit Date: 2020   LOS: 0 days      Daily Progress Note: 12/3/2020    POD:1 Day Post-Op    S/P: Procedure(s):  C5-6 DISC ARTHROPLASTY    Subjective:     Patient is doing well 1 day after cervical neck surgery. Pain is currently well-controlled. Patient is ambulating to and from the bathroom without any issue. He is swallowing and ate breakfast without issue. No nausea or vomiting. No dizziness or headache. Denies numbness, tingling, chest pain, leg pain, nausea, vomiting, difficulty swallowing, headache, and dyspnea. Pt resting comfortably in bed. His wife is at bedside and all questions were answered. Objective:     Vital signs  VSS Afebrile. Temp (24hrs), Av.8 °F (36.6 °C), Min:96.6 °F (35.9 °C), Max:98.5 °F (36.9 °C)   No intake/output data recorded.  1901 -  0700  In: 2250 [P.O.:800; I.V.:1450]  Out: 610 [Urine:550; Drains:10]    Visit Vitals  /76 (BP 1 Location: Right arm, BP Patient Position: Head of bed elevated (Comment degrees))   Pulse 80   Temp 98.1 °F (36.7 °C)   Resp 16   Ht 5' 11\" (1.803 m)   Wt 104.8 kg (231 lb 0.7 oz)   SpO2 98%   BMI 32.22 kg/m²    O2 Flow Rate (L/min): 2 l/min O2 Device: Room air       Voiding status: +  Output (mL)  Urine Voided: 550 ml (20)  Unmeasurable Output  Urine Occurrence(s): 1 (20 0617)     Pain control  Pain Assessment  Pain Scale 1: Numeric (0 - 10)  Pain Intensity 1: 3  Pain Onset 1: postop  Pain Location 1: Neck, Head  Pain Orientation 1: Posterior  Pain Description 1: Sore, Aching  Pain Intervention(s) 1: Medication (see MAR)    Physical Exam:  Gen: No acute distress. Neuro: A&Ox3. Follows commands. Speech clear. Affect normal.  AUGUSTIN spontanesouly. Strength 5/5 in UE and LE BL. Sensation intact. Gait deferred. Calves soft and supple;  No pain with passive stretch  Skin: Incision C/D/I  Drain intact    24 hour results:    No results found for this or any previous visit (from the past 24 hour(s)).        Assessment:     Active Problems:    Cervical spinal stenosis (12/2/2020)      HNP (herniated nucleus pulposus), cervical (12/2/2020)      Plan:     s/p Anterior cervical diskectomy C5-6 with anterior cervical disk arthroplasty C5-6    -Pain control - scheduled tylenol, prn oxycodone    -Drain - D/C this AM   -Diet - tolerating; no swallowing difficulty   -Voiding without issue    Readiness for discharge:     [x] Vital Signs stable    [x] + Voiding    [x] Wound intact, drainage minimal    [x] Tolerating PO intake     [x] Adequate pain control on oral medication alone     Activity: as tolerated in collar  DVT ppx: SCDs  Dispo: home with wife today    Plan d/w Dr. Katarina NIETO, [place], [place]

## 2020-12-03 NOTE — PROGRESS NOTES
Problem: Falls - Risk of  Goal: *Absence of Falls  Description: Document Christa Moore Fall Risk and appropriate interventions in the flowsheet.   12/3/2020 1159 by Sonia Cobb, RN  Outcome: Resolved/Not Met  12/3/2020 1159 by Sonia Cobb, RN  Outcome: Progressing Towards Goal  Note: Fall Risk Interventions:  Mobility Interventions: Communicate number of staff needed for ambulation/transfer         Medication Interventions: Evaluate medications/consider consulting pharmacy, Patient to call before getting OOB    Elimination Interventions: Call light in reach    History of Falls Interventions: Door open when patient unattended, Evaluate medications/consider consulting pharmacy

## 2021-02-11 ENCOUNTER — OFFICE VISIT (OUTPATIENT)
Dept: NEUROLOGY | Age: 51
End: 2021-02-11
Payer: COMMERCIAL

## 2021-02-11 VITALS
HEIGHT: 71 IN | WEIGHT: 228 LBS | SYSTOLIC BLOOD PRESSURE: 136 MMHG | DIASTOLIC BLOOD PRESSURE: 82 MMHG | OXYGEN SATURATION: 98 % | BODY MASS INDEX: 31.92 KG/M2 | HEART RATE: 92 BPM

## 2021-02-11 DIAGNOSIS — G43.719 INTRACTABLE CHRONIC MIGRAINE WITHOUT AURA AND WITHOUT STATUS MIGRAINOSUS: Primary | ICD-10-CM

## 2021-02-11 PROCEDURE — 64615 CHEMODENERV MUSC MIGRAINE: CPT | Performed by: PSYCHIATRY & NEUROLOGY

## 2021-02-11 RX ORDER — METHYLPREDNISOLONE 4 MG/1
TABLET ORAL
COMMUNITY
Start: 2020-12-30 | End: 2021-05-13 | Stop reason: ALTCHOICE

## 2021-02-11 NOTE — PROGRESS NOTES
Chief Complaint   Patient presents with    Procedure     BOTOX     Visit Vitals  /82 (BP 1 Location: Right upper arm, BP Patient Position: Sitting)   Pulse 92   Ht 5' 11\" (1.803 m)   Wt 228 lb (103.4 kg)   SpO2 98%   BMI 31.80 kg/m²

## 2021-02-11 NOTE — PROGRESS NOTES
Botox Injection Note     2021     Patient:  Damián Clancy   YOB: 1970  Date of Visit: 2021      Indication: patient has chronic migraine headaches greater than 15 days per month lasting more than 4 hours each. He has failed or not tolerated 2 or more medication preventatives. Written consent was signed and verified by me. Risks and benefits were discussed to include possible cosmetic asymmetry which is not permament or life threatening. Patient name and  was confirmed prior to procedure. Time out performed. Procedure:   Botox concentration: 200 units in 2 ml of preservative-free normal saline. 1:1 dilution. LOT#: [id number]  EXP:  10 2023    Injections and distribution as follow :      Units/site  Sites Loc Subtotal    procerus 5 1 ML 5   corrugaters 2.5 2 BL 5   frontalis 5 8 BL 40   Temporalis 10 4 BL 40   Occipitalis 10 2 BL 20   Cervical paraspinals 5 4 BL 20   Trapezius 10 4 BL 40         200 units Botox were reconstituted, 170 units injected as above and the remainder was unavoidably wasted. Patient tolerated procedure well. Return in 3 months for repeat injections.     _____________________________   [place], DO  [address], ABPN

## 2021-02-16 RX ORDER — VENLAFAXINE HYDROCHLORIDE 150 MG/1
CAPSULE, EXTENDED RELEASE ORAL
Qty: 90 CAP | Refills: 3 | Status: SHIPPED | OUTPATIENT
Start: 2021-02-16 | End: 2021-06-22

## 2021-03-08 ENCOUNTER — TELEPHONE (OUTPATIENT)
Dept: NEUROLOGY | Age: 51
End: 2021-03-08

## 2021-03-09 NOTE — TELEPHONE ENCOUNTER
Re: Botox approved    Renewal request rec'd from [facility]     Jacquelyn Garcia approved 02/06/21-03/09/22  PA # [id number]    [id number] does not require a PA     SSP remains [facility].

## 2021-04-02 RX ORDER — MEMANTINE HYDROCHLORIDE 14 MG/1
CAPSULE, EXTENDED RELEASE ORAL
Qty: 90 CAP | Refills: 3 | Status: SHIPPED | OUTPATIENT
Start: 2021-04-02 | End: 2022-03-31

## 2021-05-13 ENCOUNTER — OFFICE VISIT (OUTPATIENT)
Dept: NEUROLOGY | Age: 51
End: 2021-05-13
Payer: COMMERCIAL

## 2021-05-13 VITALS
BODY MASS INDEX: 33.07 KG/M2 | DIASTOLIC BLOOD PRESSURE: 80 MMHG | SYSTOLIC BLOOD PRESSURE: 130 MMHG | HEIGHT: 71 IN | WEIGHT: 236.2 LBS | HEART RATE: 94 BPM | OXYGEN SATURATION: 99 %

## 2021-05-13 DIAGNOSIS — G43.719 INTRACTABLE CHRONIC MIGRAINE WITHOUT AURA AND WITHOUT STATUS MIGRAINOSUS: Primary | ICD-10-CM

## 2021-05-13 PROCEDURE — 64615 CHEMODENERV MUSC MIGRAINE: CPT | Performed by: PSYCHIATRY & NEUROLOGY

## 2021-05-13 NOTE — PROGRESS NOTES
Botox Injection Note     2021     Patient:  Belkys Marques   YOB: 1970  Date of Visit: 2021      Indication: patient has chronic migraine headaches greater than 15 days per month lasting more than 4 hours each. He has failed or not tolerated 2 or more medication preventatives. Written consent was signed and verified by me. Risks and benefits were discussed to include possible cosmetic asymmetry which is not permament or life threatening. Patient name and  was confirmed prior to procedure. Time out performed. Procedure:   Botox concentration: 200 units in 2 ml of preservative-free normal saline. 1:1 dilution. LOT#: [id number]  EXP:  2023    Injections and distribution as follow :      Units/site  Sites Loc Subtotal    procerus 5 1 ML 5   corrugaters 2.5 2 BL 5   frontalis 5 8 BL 40   Temporalis 10 4 BL 40   Occipitalis 10 2 BL 20   Cervical paraspinals 5 4 BL 20   Trapezius 10 4 BL 40         200 units Botox were reconstituted, 170 units injected as above and the remainder was unavoidably wasted. Patient tolerated procedure well. Return in 3 months for repeat injections.     _____________________________   Christianne Milner DO  [address], ABPN

## 2021-05-13 NOTE — PROGRESS NOTES
Chief Complaint   Patient presents with    Procedure     BOTOX     Visit Vitals  /80 (BP 1 Location: Right upper arm, BP Patient Position: Sitting)   Pulse 94   Ht 5' 11\" (1.803 m)   Wt 236 lb 3.2 oz (107.1 kg)   SpO2 99%   BMI 32.94 kg/m²

## 2021-06-21 ENCOUNTER — TELEPHONE (OUTPATIENT)
Dept: NEUROLOGY | Age: 51
End: 2021-06-21

## 2021-06-21 NOTE — TELEPHONE ENCOUNTER
Patient calling stating he is out of town due to a family emergency and realized he has forgotten his Venlafaxine.  He is requesting a2 week refill sent to [facility] in [facility]

## 2021-06-22 ENCOUNTER — TELEPHONE (OUTPATIENT)
Dept: NEUROLOGY | Age: 51
End: 2021-06-22

## 2021-06-22 RX ORDER — VENLAFAXINE HYDROCHLORIDE 150 MG/1
CAPSULE, EXTENDED RELEASE ORAL
Qty: 15 CAPSULE | Refills: 0 | Status: SHIPPED | OUTPATIENT
Start: 2021-06-22 | End: 2022-02-11

## 2021-06-22 NOTE — TELEPHONE ENCOUNTER
Checking on the status the medicaiton refill for  Venlafaxine address is:  [address] [place] [address] [place] [address]

## 2021-06-22 NOTE — TELEPHONE ENCOUNTER
----- Message from Jurgen Prabhakar sent at 6/22/2021 12:38 PM EDT -----  Regarding: DO Melvin/Telephone  General Message/Vendor Calls    Caller's first and last name: Patient       Reason for call: is out of town on [facility] and need a vacation pack of  Venlafaxine Medication called in to [facility] in [place] the Phone number is [phone],       Callback required yes/no and why: yes      Best contact number(s):[phone]      Details to clarify the request:      Jurgen Prabhakar

## 2021-08-12 ENCOUNTER — OFFICE VISIT (OUTPATIENT)
Dept: NEUROLOGY | Age: 51
End: 2021-08-12
Payer: COMMERCIAL

## 2021-08-12 VITALS
SYSTOLIC BLOOD PRESSURE: 138 MMHG | OXYGEN SATURATION: 98 % | DIASTOLIC BLOOD PRESSURE: 88 MMHG | HEART RATE: 92 BPM | BODY MASS INDEX: 32.9 KG/M2 | HEIGHT: 71 IN | WEIGHT: 235 LBS

## 2021-08-12 DIAGNOSIS — G43.719 INTRACTABLE CHRONIC MIGRAINE WITHOUT AURA AND WITHOUT STATUS MIGRAINOSUS: Primary | ICD-10-CM

## 2021-08-12 PROCEDURE — 64615 CHEMODENERV MUSC MIGRAINE: CPT | Performed by: PSYCHIATRY & NEUROLOGY

## 2021-08-12 RX ORDER — FLUOROURACIL 50 MG/G
CREAM TOPICAL
COMMUNITY
Start: 2021-08-11 | End: 2022-03-01 | Stop reason: CLARIF

## 2021-08-12 NOTE — PROGRESS NOTES
Botox Injection Note     2021     Patient:  Baudilio Maldonado   YOB: 1970  Date of Visit: 2021      Indication: patient has chronic migraine headaches greater than 15 days per month lasting more than 4 hours each. He has failed or not tolerated 2 or more medication preventatives. Written consent was signed and verified by me. Risks and benefits were discussed to include possible cosmetic asymmetry which is not permament or life threatening. Patient name and  was confirmed prior to procedure. Time out performed. Procedure:   Botox concentration: 200 units in 2 ml of preservative-free normal saline. 1:1 dilution. LOT#: [id number]  EXP:  2024    Injections and distribution as follow :      Units/site  Sites Loc Subtotal    procerus 5 1 ML 5   corrugaters 2.5 2 BL 5   frontalis 5 8 BL 40   Temporalis 10 4 BL 40   Occipitalis 10 2 BL 20   Cervical paraspinals 5 4 BL 20   Trapezius 10 4 BL 40         200 units Botox were reconstituted, 170 units injected as above and the remainder was unavoidably wasted. Patient tolerated procedure well. Return in 3 months for repeat injections.     _____________________________   Gloria Dickerson, DO  [address], ABPN

## 2021-08-12 NOTE — PROGRESS NOTES
Chief Complaint   Patient presents with    Procedure     Botox     Visit Vitals  /88 (BP 1 Location: Right upper arm, BP Patient Position: Sitting)   Pulse 92   Ht 5' 11\" (1.803 m)   Wt 235 lb (106.6 kg)   SpO2 98%   BMI 32.78 kg/m²

## 2021-10-07 ENCOUNTER — TELEPHONE (OUTPATIENT)
Dept: NEUROLOGY | Age: 51
End: 2021-10-07

## 2021-10-07 NOTE — TELEPHONE ENCOUNTER
Patient calling stating his insurance denied botox.  Insurance said the claim number is [id number] and the phone number to call is [phone]

## 2021-10-08 ENCOUNTER — TELEPHONE (OUTPATIENT)
Dept: NEUROLOGY | Age: 51
End: 2021-10-08

## 2021-10-08 NOTE — TELEPHONE ENCOUNTER
Re: Botox    CPT 60838, submitted PD to [facility] was used. Case # [id number]. Submitted for future appt's bc pt received denied claim for last office visit on 08/12/21. Awaiting update.

## 2021-10-08 NOTE — TELEPHONE ENCOUNTER
Re: Botox    Called pt and he advised he has received a denied claim, not a bill, not a denial letter but a denied claim. He is sending me a copy for review. As of now pt has approval under [facility] through march of 2022. Awaiting scanned document from pt.

## 2021-10-08 NOTE — TELEPHONE ENCOUNTER
----- Message from Jimmy Jerez sent at 10/8/2021 11:23 AM EDT -----  Regarding: Dr. Carlos Silverman / Carine Tran first and last name: self      Reason for call: Botox Insurance Paperwork      Callback required yes/no and why: yes      Best contact number(s): [phone]      Details to clarify the request: caller stated he spoke with Wilfrido See yesterday, 10/07/2021. about having the Insurance Dept resubmitting the correct paperwork for his Botox and was suppose to receive a call back about updates.  Please call back to assist.      Jimmy Jerez

## 2021-11-04 NOTE — TELEPHONE ENCOUNTER
Re: Botox    No update received, re-entered pt again into [employer] and submitted for reivew for CPT [id number]

## 2021-11-04 NOTE — TELEPHONE ENCOUNTER
Re: botox    Rcvd Call from [facility] with [address], she advised request is approved but can only be approved through 12/31/2021.     Auth# [id number]

## 2021-11-11 ENCOUNTER — OFFICE VISIT (OUTPATIENT)
Dept: NEUROLOGY | Age: 51
End: 2021-11-11
Payer: COMMERCIAL

## 2021-11-11 VITALS
BODY MASS INDEX: 32.2 KG/M2 | HEIGHT: 71 IN | DIASTOLIC BLOOD PRESSURE: 84 MMHG | HEART RATE: 80 BPM | OXYGEN SATURATION: 97 % | SYSTOLIC BLOOD PRESSURE: 134 MMHG | WEIGHT: 230 LBS

## 2021-11-11 DIAGNOSIS — G43.719 INTRACTABLE CHRONIC MIGRAINE WITHOUT AURA AND WITHOUT STATUS MIGRAINOSUS: Primary | ICD-10-CM

## 2021-11-11 PROCEDURE — 64615 CHEMODENERV MUSC MIGRAINE: CPT | Performed by: PSYCHIATRY & NEUROLOGY

## 2021-11-11 NOTE — PROGRESS NOTES
Chief Complaint   Patient presents with    Procedure     Botox     Visit Vitals  /84 (BP 1 Location: Left upper arm, BP Patient Position: Sitting)   Pulse 80   Ht 5' 11\" (1.803 m)   Wt 230 lb (104.3 kg)   SpO2 97%   BMI 32.08 kg/m²

## 2021-11-11 NOTE — PROGRESS NOTES
Botox Injection Note     2021     Patient:  Sandeep Fairchild   YOB: 1970  Date of Visit: 2021      Indication: patient has chronic migraine headaches greater than 15 days per month lasting more than 4 hours each. He has failed or not tolerated 2 or more medication preventatives. Written consent was signed and verified by me. Risks and benefits were discussed to include possible cosmetic asymmetry which is not permament or life threatening. Patient name and  was confirmed prior to procedure. Time out performed. Procedure:   Botox concentration: 200 units in 2 ml of preservative-free normal saline. 1:1 dilution. LOT#: [id number]  EXP:  2024    Injections and distribution as follow :      Units/site  Sites Loc Subtotal    procerus 5 1 ML 5   corrugaters 2.5 2 BL 5   frontalis 5 8 BL 40   Temporalis 10 4 BL 40   Occipitalis 10 2 BL 20   Cervical paraspinals 5 4 BL 20   Trapezius 10 4 BL 40         200 units Botox were reconstituted, 170 units injected as above and the remainder was unavoidably wasted. Patient tolerated procedure well. Return in 3 months for repeat injections.     _____________________________   Mayi Mckenna,   [address], ABPN

## 2021-11-17 ENCOUNTER — TRANSCRIBE ORDER (OUTPATIENT)
Dept: REGISTRATION | Age: 51
End: 2021-11-17

## 2021-11-17 DIAGNOSIS — Z01.812 PRE-PROCEDURE LAB EXAM: Primary | ICD-10-CM

## 2021-11-23 NOTE — H&P
Gera Gordon  : 1970   / Language: English / Race: White  Male      History of Present Illness   The patient is a 45year old male who presents with a complaint of Low Back Pain. Note for \"Low Back Pain\": He is approximately 4 and half months out from a cervical disc arthroplasty.  He is doing generally very well in terms of his neck.  His only complaints are his low back pain is left leg pain with walking and standing.  He has full range of motion of the neck with improvement of the headaches.  He is not on a pain medications. Additional reasons for visit:    Neck pain      Diagnostic Studies History  Cervical Spine X-ray   Date: 2021, Results: Stable C5-6 disc arthroplasty with well maintained alignment and motion  MRI, Spine   L SPINE, Date: 2021, Results: Review of the MRI demonstrates a bilateral pars defect at L5-S1. Foraminal stenosis bilaterally left greater than right. Spondylosis at L5-S1. Grade 1 spondylolisthesis. Lumbar Spine X-ray   Date: 2020, Results: AP, lateral, flexion and extension films were obtained today using digital radiography. Demonstrates grade 1/grade 2 anterolisthesis L5 on S1. Possible pars defect L5-S1. No fractures or lytic lesions. Review of Systems   General Present- Seasonal Allergies. Not Present- HIV Exposure and Persistent Infections. HEENT Present- Ringing in the Ears. Not Present- Decreased Hearing, Earache, Hoarseness, Loose Teeth, Nose Bleed and Sore Throat. Musculoskeletal Present- Back Pain and Joint Stiffness. Not Present- Joint Pain and Joint Swelling. Neurological Present- Headaches, Memory Loss, Numbness, Tingling, Tremor and Unsteadiness. Not Present- Fainting, Seizures and Weakness. Psychiatric Present- Depression. Not Present- Anxiety and Bipolar. Endocrine Present- Cold Intolerance. Not Present- Excessive Hunger, Excessive Thirst, Excessive Urination and Heat Intolerance.       Physical Exam Neurologic  Sensory  Light Touch - Intact - Globally. Overall Assessment of Muscle Strength and Tone reveals  Lower Extremities - Right Iliopsoas - 5/5. Left Iliopsoas - 5/5. Right Tibialis Anterior - 5/5. Left Tibialis Anterior - 5/5. Right Gastroc-Soleus - 5/5. Left Gastroc-Soleus - 5/5. Right EHL - 4-/5. Left EHL - 4-/5. General Assessment of Reflexes  Right Ankle - Clonus is not present. Left Ankle - Clonus is not present. Reflexes (Dermatomes)  2/2 Normal - Left Achilles (L5-S2), Left Knee (L2-4), Right Achilles (L5-S2) and Right Knee (L2-4). Musculoskeletal  Global Assessment  Examination of related systems reveals - well-developed, well-nourished, in no acute distress, alert and oriented x 3. Gait and Station - normal gait and station and normal posture. Right Lower Extremity - normal strength and tone, normal range of motion without pain and no instability, subluxation or laxity. Left Lower Extremity - normal strength and tone, normal range of motion without pain and no instability, subluxation or laxity. Spine/Ribs/Pelvis  Cervical Spine - Examination of the cervical spine reveals - no tenderness to palpation, no pain, no swelling, edema or erythema, normal cervical spine movements and normal sensation. Thoracic (Dorsal) Spine - Examination of the thoracic spine reveals - no tenderness over thoracic vertebrae, no pain, normal sensation and normal thoracic spine movements. Lumbosacral Spine - Examination of the lumbosacral spine reveals - no known fractures or deformities. Inspection and Palpation - Tenderness - moderate. Assessment of pain reveals the following findings - The pain is characterized as - moderate. Location - pain refers to lower back bilaterally. ROJM - Trunk Extension - 15 degrees. Lumbar Spine Flexion - 35 °. Lumbosacral Spine - Functional Testing - Babinski Test negative, Prone Knee Bending Test negative, Slump Test negative, Straight Leg Raising Test negative.         Assessment & Plan    Spondylolisthesis (Congenital) (756.12  Q76.2)  Impression: He is doing very well in terms of his neck. He has had 3 injections of his lower back without significant long-term improvement. Is failed conservative treatment. He has increasing back pain and left leg symptoms on the increasing spondylolisthesis at L5-S1. I think is a candidate for a limited lumbar laminectomy at L5-S1 with posterior lumbar fusion. Risk and benefits were discussed at this time. The risks and benefits were discussed at length with the patient and the patient has elected to proceed. Indications for surgery include failed conservative treatment. Alternative treatments, risks and the perioperative course were discussed with the patient. All questions were answered. The risks and benefits of the procedure were explained. Benefits include definitive diagnosis, relief of pain, elimination of deformity and improved function. Risks of surgery including bleeding, infection, weakness, numbness, CSF leak, failure to improve symptoms, exacerbation of medical co-morbidities and even death were discussed with the patient. Low back pain (724.2  M54.5)      Lumbar stenosis with neurogenic claudication (724.03  M48.062)      Treatment options were discussed with the patient in full.(V65.49)      Date of Surgery Update:  Damián Clancy was seen and examined. History and physical has been reviewed. The patient has been examined.  There have been no significant clinical changes since the completion of the originally dated History and Physical.    Signed By: Dion Henry MD     November 30, 2021 12:14 PM

## 2021-11-24 ENCOUNTER — HOSPITAL ENCOUNTER (OUTPATIENT)
Dept: PREADMISSION TESTING | Age: 51
Discharge: HOME | End: 2021-11-24
Payer: COMMERCIAL

## 2021-11-24 ENCOUNTER — HOSPITAL ENCOUNTER (OUTPATIENT)
Dept: PREADMISSION TESTING | Age: 51
Discharge: HOME | End: 2021-11-24
Attending: ORTHOPAEDIC SURGERY
Payer: COMMERCIAL

## 2021-11-24 VITALS
BODY MASS INDEX: 33.27 KG/M2 | OXYGEN SATURATION: 98 % | DIASTOLIC BLOOD PRESSURE: 86 MMHG | HEART RATE: 95 BPM | TEMPERATURE: 98.2 F | SYSTOLIC BLOOD PRESSURE: 125 MMHG | HEIGHT: 71 IN | WEIGHT: 237.66 LBS

## 2021-11-24 DIAGNOSIS — Z01.812 PRE-PROCEDURE LAB EXAM: ICD-10-CM

## 2021-11-24 LAB
ABO + RH BLD: NORMAL
ANION GAP SERPL CALC-SCNC: 5 MMOL/L (ref 5–15)
APPEARANCE UR: CLEAR
ATRIAL RATE: 72 BPM
BACTERIA URNS QL MICRO: NEGATIVE /HPF
BILIRUB UR QL: NEGATIVE
BLOOD GROUP ANTIBODIES SERPL: NORMAL
BUN SERPL-MCNC: 12 MG/DL (ref 6–20)
BUN/CREAT SERPL: 16 (ref 12–20)
CALCIUM SERPL-MCNC: 9.1 MG/DL (ref 8.5–10.1)
CALCULATED P AXIS, ECG09: 9 DEGREES
CALCULATED R AXIS, ECG10: -32 DEGREES
CALCULATED T AXIS, ECG11: 14 DEGREES
CHLORIDE SERPL-SCNC: 105 MMOL/L (ref 97–108)
CO2 SERPL-SCNC: 26 MMOL/L (ref 21–32)
COLOR UR: NORMAL
CREAT SERPL-MCNC: 0.77 MG/DL (ref 0.7–1.3)
DIAGNOSIS, 93000: NORMAL
EPITH CASTS URNS QL MICRO: NORMAL /LPF
ERYTHROCYTE [DISTWIDTH] IN BLOOD BY AUTOMATED COUNT: 13.4 % (ref 11.5–14.5)
EST. AVERAGE GLUCOSE BLD GHB EST-MCNC: 108 MG/DL
GLUCOSE SERPL-MCNC: 104 MG/DL (ref 65–100)
GLUCOSE UR STRIP.AUTO-MCNC: NEGATIVE MG/DL
HBA1C MFR BLD: 5.4 % (ref 4–5.6)
HCT VFR BLD AUTO: 46.6 % (ref 36.6–50.3)
HGB BLD-MCNC: 14.9 G/DL (ref 12.1–17)
HGB UR QL STRIP: NEGATIVE
HYALINE CASTS URNS QL MICRO: NORMAL /LPF (ref 0–5)
INR PPP: 1 (ref 0.9–1.1)
KETONES UR QL STRIP.AUTO: NEGATIVE MG/DL
LEUKOCYTE ESTERASE UR QL STRIP.AUTO: NEGATIVE
MCH RBC QN AUTO: 27.5 PG (ref 26–34)
MCHC RBC AUTO-ENTMCNC: 32 G/DL (ref 30–36.5)
MCV RBC AUTO: 86.1 FL (ref 80–99)
NITRITE UR QL STRIP.AUTO: NEGATIVE
NRBC # BLD: 0 K/UL (ref 0–0.01)
NRBC BLD-RTO: 0 PER 100 WBC
P-R INTERVAL, ECG05: 142 MS
PH UR STRIP: 6.5 [PH] (ref 5–8)
PLATELET # BLD AUTO: 225 K/UL (ref 150–400)
PMV BLD AUTO: 9.9 FL (ref 8.9–12.9)
POTASSIUM SERPL-SCNC: 4.3 MMOL/L (ref 3.5–5.1)
PROT UR STRIP-MCNC: NEGATIVE MG/DL
PROTHROMBIN TIME: 10.3 SEC (ref 9–11.1)
Q-T INTERVAL, ECG07: 402 MS
QRS DURATION, ECG06: 102 MS
QTC CALCULATION (BEZET), ECG08: 440 MS
RBC # BLD AUTO: 5.41 M/UL (ref 4.1–5.7)
RBC #/AREA URNS HPF: NORMAL /HPF (ref 0–5)
SODIUM SERPL-SCNC: 136 MMOL/L (ref 136–145)
SP GR UR REFRACTOMETRY: <1.005 (ref 1–1.03)
SPECIMEN EXP DATE BLD: NORMAL
UA: UC IF INDICATED,UAUC: NORMAL
UROBILINOGEN UR QL STRIP.AUTO: 0.2 EU/DL (ref 0.2–1)
VENTRICULAR RATE, ECG03: 72 BPM
WBC # BLD AUTO: 5.3 K/UL (ref 4.1–11.1)
WBC URNS QL MICRO: NORMAL /HPF (ref 0–4)

## 2021-11-24 PROCEDURE — 83036 HEMOGLOBIN GLYCOSYLATED A1C: CPT

## 2021-11-24 PROCEDURE — 36415 COLL VENOUS BLD VENIPUNCTURE: CPT

## 2021-11-24 PROCEDURE — U0005 INFEC AGEN DETEC AMPLI PROBE: HCPCS

## 2021-11-24 PROCEDURE — 85610 PROTHROMBIN TIME: CPT

## 2021-11-24 PROCEDURE — 86901 BLOOD TYPING SEROLOGIC RH(D): CPT

## 2021-11-24 PROCEDURE — 85027 COMPLETE CBC AUTOMATED: CPT

## 2021-11-24 PROCEDURE — 93005 ELECTROCARDIOGRAM TRACING: CPT

## 2021-11-24 PROCEDURE — 80048 BASIC METABOLIC PNL TOTAL CA: CPT

## 2021-11-24 PROCEDURE — 81001 URINALYSIS AUTO W/SCOPE: CPT

## 2021-11-24 RX ORDER — IBUPROFEN 200 MG
400 TABLET ORAL
COMMUNITY
End: 2021-12-02

## 2021-11-24 RX ORDER — NAPROXEN SODIUM 220 MG
220 TABLET ORAL AS NEEDED
COMMUNITY
End: 2021-12-02

## 2021-11-24 RX ORDER — BISMUTH SUBSALICYLATE 262 MG/15ML
30 LIQUID ORAL
COMMUNITY

## 2021-11-24 RX ORDER — LOPERAMIDE HCL 2 MG
2 TABLET ORAL
COMMUNITY

## 2021-11-24 RX ORDER — ACETAMINOPHEN 500 MG
1000 TABLET ORAL
COMMUNITY

## 2021-11-24 RX ORDER — DICLOFENAC POTASSIUM 50 MG/1
50 TABLET, FILM COATED ORAL AS NEEDED
COMMUNITY
End: 2021-12-02

## 2021-11-24 NOTE — PERIOP NOTES
PAT Nutrition Screen    1. Has the patient had an unplanned weight loss of 10% of body weight or more in the last 6 months? No = 0   2. Has the patient been eating less than 50% of their normal diet in the preceding week? No = 0       Patient instructed on Non-Diabetic pre-operative nutrition plan to start 5 days prior to surgery. Patient given 10 shakes and 3 pre-surgery drinks. Opportunity given for questions and all questions answered. Preoperative instructions reviewed with patient. Patient given two bottles CHG soap. Instructions reviewed on use of CHG soap. Patient given SSI infection sheet. MRSA/MSSA treatment instruction sheet given with an explanation to patient that they  will be notified if treatment instructions need to be initiated. Patient was given the opportunity to ask questions on the information provided.

## 2021-11-25 LAB
BACTERIA SPEC CULT: NORMAL
BACTERIA SPEC CULT: NORMAL
SARS-COV-2, XPLCVT: NOT DETECTED
SERVICE CMNT-IMP: NORMAL
SOURCE, COVRS: NORMAL

## 2021-11-29 NOTE — PERIOP NOTES
PAT Nurse Practitioner   Pre-Operative Chart Review/Assessment:-ORTHOPEDIC/NEUROSURGICAL SPINE                Patient Name:  Charles Moore                                                           Age:   45 y.o.    :  1970     Today's Date:  2021     Date of PAT:   2021      Date of Surgery:    2021      Procedure(s):  L5-S1 Laminectomy and Fusion     Surgeon:   Dr. Elian Flores                       PLAN:       1)  PCP: Dr. Larissa Panchal IV        2)  Cardiac Clearance: EKG and METs reviewed. No further cardiac evaluation requested. 3)  Diabetic Treatment Consult: Not indicated. A1c-5.4       4)  Sleep Apnea evaluation:  +JORGE dx. Pt does not use CPAP. 5)  Treatment for MRSA/Staph Aureus: Neg       6)  Additional Concerns: HTN, GERD, Asthma, Post Concussion Syndrome/migraines, depression              Vital Signs:         Vitals:    21 1205   BP: 125/86   Pulse: 95   Temp: 98.2 °F (36.8 °C)   SpO2: 98%   Weight: 107.8 kg (237 lb 10.5 oz)   Height: 5' 11\" (1.803 m)          Preoperative Nutrition Screen (JACOB)   Patient's Age: 45 y.o. Patient's BMI: Estimated body mass index is 33.15 kg/m² as calculated from the following:    Height as of this encounter: 5' 11\" (1.803 m). Weight as of this encounter: 107.8 kg (237 lb 10.5 oz). If the answer to any of the following is Yes, then recommend prescribe Oral Nutrition Supplements (ONS) for at least 5 days prior to surgery and/or order referral to dietitian for further assessment and nutrition therapy. 1. Does the patient have a documented serum albumin less than 3.0 within the last 90 days? Unknown = 0       2. Is patient's BMI less than 18.5 (or less than 20 if age over 72)? No = 0        3. Has the patient had an unplanned weight loss of 10% of body weight or more in the last 6 months? No = 0   4. Has the patient been eating less than 50% of their normal diet in the preceding week?  No = 0   JACOB Score (number of Yes responses), 0-4 0     Plan:   2 daily immuno nutrition shakes, 5 days prior to surgery and 5 days post-operatively. 3 pre-surgery drinks. Electronically signed by Hudson Epps NP on 11/29/21 at 11:09 AM    ____________________________________________  PAST MEDICAL HISTORY  Past Medical History:   Diagnosis Date    Adverse effect of anesthesia     SLOW TO AROUSE     Allergic rhinitis 4/8/2012    Asthma 4/8/2012    Balance disorder     Cancer ([facility] [address]     skin ca exision from scalp w/ subsequent  local numbness [facility] FOREHEAD    GERD (gastroesophageal reflux disease)     h/o gerd    HX OTHER MEDICAL     rt shoulder posterior instability     Hypertension     Intractable chronic migraine without aura 2/9/2019    Dr.Kim Charles    Migraine 4/8/2012    Post concussion syndrome 2/9/2019    Psychiatric disorder     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 dislocated jaw . eval'd by ENT . Dr. Dwain Morris and Dentist     Tremor, coarse 2/9/2019    Vertigo       ____________________________________________  PAST SURGICAL HISTORY  Past Surgical History:   Procedure Laterality Date    HX BACK SURGERY  2020    Cervical C5- C6 DISC surgery     HX CHOLECYSTECTOMY      HX HEENT  march 2015    Septoplasty, sinoplasty, turbinate reduction , Dr. Angie Brantley [address]      inguinal     HX ORTHOPAEDIC  12/2018    Right Rotator Cuff Repair. Ja Cowart MD CARTILADGE, BONE SPUR     HX OTHER SURGICAL      skin ca excision from face.      HX VASECTOMY      HX WISDOM TEETH EXTRACTION      X4 AT 22 YRS OLD      ____________________________________________  HOME MEDICATIONS    Current Outpatient Medications   Medication Sig    ibuprofen (AdviL) 200 mg tablet Take 400 mg by mouth every six (6) hours as needed for Pain.  acetaminophen (Tylenol Extra Strength) 500 mg tablet Take 1,000 mg by mouth every six (6) hours as needed for Pain.  naproxen sodium (Aleve) 220 mg tablet Take 220 mg by mouth as needed.  chlorphen/pseudoeph/ibuprofen (ADVIL ALLERGY SINUS PO) Take 1 Tablet by mouth as needed.  loperamide (IMMODIUM) 2 mg tablet Take 2 mg by mouth four (4) times daily as needed for Diarrhea.  bismuth subsalicylate (PEPTO-BISMOL) 262 mg/15 mL suspension Take 30 mL by mouth every six (6) hours as needed for Indigestion.  acetaminophen/caffeine (EXCEDRIN TENSION HEADACHE PO) Take 2 Tablets by mouth as needed.  diclofenac potassium (CATAFLAM) 50 mg tablet Take 50 mg by mouth as needed.  venlafaxine-SR (EFFEXOR-XR) 150 mg capsule TAKE 1 CAPSULE DAILY    memantine ER (NAMENDA XR) 14 mg capsule TAKE 1 CAPSULE DAILY    amLODIPine-benazepril (LOTREL) 5-20 mg per capsule TAKE 1 CAPSULE DAILY (NEEDS APPOINTMENT)    onabotulinumtoxinA (Botox) 200 unit injection Inject selected muscles head and neck bilaterally every 12 weeks  Indications: migraine prevention    melatonin tab tablet Take 5 mg by mouth nightly.  magnesium oxide (MAG-OX) 400 mg tablet Take 1 Tab by mouth nightly.  fluticasone (FLONASE) 50 mcg/actuation nasal spray 1 [place] by Both Nostrils route two (2) times a day.  montelukast (SINGULAIR) 10 mg tablet Take 10 mg by mouth daily.  Cetirizine (ZYRTEC) 10 mg Cap Take  by mouth.  albuterol (PROVENTIL HFA, VENTOLIN HFA) 90 mcg/actuation inhaler Take 2 Puffs by inhalation every four (4) hours as needed.       fluorouraciL (EFUDEX) 5 % chemo cream  (Patient not taking: Reported on 11/24/2021)     No current facility-administered medications for this encounter.      ____________________________________________  ALLERGIES  Allergies   Allergen Reactions    Amitriptyline Other (comments)     Mental slowing     Gabapentin Other (comments)     Weight gain ____________________________________________  SOCIAL HISTORY  Social History     Tobacco Use    Smoking status: Never Smoker    Smokeless tobacco: Never Used   Substance Use Topics    Alcohol use: Yes     Comment: 10-14 drinks per week      ____________________________________________   Internal Administration   First Dose COVID-19, PFIZER, MRNA, LNP-S, PF, 30MCG/0.3ML DOSE  01/22/2021   Second Dose COVID-19, PFIZER, MRNA, LNP-S, PF, 30MCG/0.3ML DOSE  02/17/2021      Last COVID Lab SARS-CoV-2 ( )   Date Value   11/24/2021 Not detected      ____________________________________________    Labs:     Hospital Outpatient Visit on 11/24/2021   Component Date Value Ref Range Status    Specimen source 11/24/2021 Nasopharyngeal    Final    SARS-CoV-2 11/24/2021 Not detected  NOTD   Final    Comment:      The specimen is NEGATIVE for SARS-CoV-2, the novel coronavirus associated with COVID-19. A negative result does not rule out COVID-19. Mariano SARS-CoV-2 for use on the [employer] is a real-time RT-PCR test intended for the qualitative detection of nucleic acids from SARS-CoV-2  in clinician-collected nasal, nasopharyngeal,and oropharyngeal swab specimens from individuals who meet COVID-19 clinical and/or epidemiological criteria. Mariano SARS-CoV-2 is for use only under Emergency Use Authorization (EUA) in laboratories certified under [address] (CLIA), [place] [address], that meet requirements to perform high or moderate complexity tests. An individual without symptoms of COVID-19 and who is not shedding SARS-CoV-2 virus would expect to have a negative (not detected) result in this assay.   Fact sheet for Healthcare Providers: ConventionUpdate.co.nz  Fact sheet for Patients: http://www.[employer]/                           [address]/download       Methodology: RT-PCR     Hospital Outpatient Visit on 11/24/2021   Component Date Value Ref Range Status    Sodium 11/24/2021 136  136 - 145 mmol/L Final    Potassium 11/24/2021 4.3  3.5 - 5.1 mmol/L Final    Chloride 11/24/2021 105  97 - 108 mmol/L Final    CO2 11/24/2021 26  21 - 32 mmol/L Final    Anion gap 11/24/2021 5  5 - 15 mmol/L Final    Glucose 11/24/2021 104* 65 - 100 mg/dL Final    BUN 11/24/2021 12  6 - 20 MG/DL Final    Creatinine 11/24/2021 0.77  0.70 - 1.30 MG/DL Final    BUN/Creatinine ratio 11/24/2021 16  12 - 20   Final    GFR est AA 11/24/2021 >60  >60 ml/min/1.73m2 Final    GFR est non-AA 11/24/2021 >60  >60 ml/min/1.73m2 Final    Estimated GFR is calculated using the IDMS-traceable Modification of Diet in Renal Disease (MDRD) Study equation, reported for both  Americans (GFRAA) and non- Americans (GFRNA), and normalized to 1.73m2 body surface area. The physician must decide which value applies to the patient.  Calcium 11/24/2021 9.1  8.5 - 10.1 MG/DL Final    WBC 11/24/2021 5.3  4.1 - 11.1 K/uL Final    RBC 11/24/2021 5.41  4.10 - 5.70 M/uL Final    HGB 11/24/2021 14.9  12.1 - 17.0 g/dL Final    HCT 11/24/2021 46.6  36.6 - 50.3 % Final    MCV 11/24/2021 86.1  80.0 - 99.0 FL Final    MCH 11/24/2021 27.5  26.0 - 34.0 PG Final    MCHC 11/24/2021 32.0  30.0 - 36.5 g/dL Final    RDW 11/24/2021 13.4  11.5 - 14.5 % Final    PLATELET 24/24/3391 317  150 - 400 K/uL Final    MPV 11/24/2021 9.9  8.9 - 12.9 FL Final    NRBC 11/24/2021 0.0  0  WBC Final    ABSOLUTE NRBC 11/24/2021 0.00  0.00 - 0.01 K/uL Final    Crossmatch Expiration 11/24/2021 12/03/2021,2359   Final    ABO/Rh(D) 11/24/2021 B POSITIVE   Final    Antibody screen 11/24/2021 NEG   Final    INR 11/24/2021 1.0  0.9 - 1.1   Final    A single therapeutic range for Vit K antagonists may not be optimal for all indications - see June, 2008 issue of Chest, American College of Chest Physicians Evidence-Based Clinical Practice Guidelines, 8th Edition.     Prothrombin time 11/24/2021 10.3  9.0 - 11.1 sec Final    Color 11/24/2021 YELLOW/STRAW    Final    Color Reference Range: Straw, Yellow or Dark Yellow    Appearance 11/24/2021 CLEAR  CLEAR   Final    Specific gravity 11/24/2021 <1.005  1.003 - 1.030 Final    pH (UA) 11/24/2021 6.5  5.0 - 8.0   Final    Protein 11/24/2021 Negative  NEG mg/dL Final    Glucose 11/24/2021 Negative  NEG mg/dL Final    Ketone 11/24/2021 Negative  NEG mg/dL Final    Bilirubin 11/24/2021 Negative  NEG   Final    Blood 11/24/2021 Negative  NEG   Final    Urobilinogen 11/24/2021 0.2  0.2 - 1.0 EU/dL Final    Nitrites 11/24/2021 Negative  NEG   Final    Leukocyte Esterase 11/24/2021 Negative  NEG   Final    UA:UC IF INDICATED 11/24/2021 CULTURE NOT INDICATED BY UA RESULT  CNI   Final    WBC 11/24/2021 0-4  0 - 4 /hpf Final    RBC 11/24/2021 0-5  0 - 5 /hpf Final    Epithelial cells 11/24/2021 FEW  FEW /lpf Final    Epithelial cell category consists of squamous cells and /or transitional urothelial cells. Renal tubular cells, if present, are separately identified as such.     Bacteria 11/24/2021 Negative  NEG /hpf Final    Hyaline cast 11/24/2021 0-2  0 - 5 /lpf Final    Ventricular Rate 11/24/2021 72  BPM Final    Atrial Rate 11/24/2021 72  BPM Final    P-R Interval 11/24/2021 142  ms Final    QRS Duration 11/24/2021 102  ms Final    Q-T Interval 11/24/2021 402  ms Final    QTC Calculation (Bezet) 11/24/2021 440  ms Final    Calculated P Axis 11/24/2021 9  degrees Final    Calculated R Axis 11/24/2021 -32  degrees Final    Calculated T Axis 11/24/2021 14  degrees Final    Diagnosis 11/24/2021    Final                    Value:Normal sinus rhythm  Left anterior fascicular block  No previous ECGs available  Confirmed by Carine Phipps M.D., Avelina Osler ([id number]) on 11/24/2021 1:33:41 PM      Hemoglobin A1c 11/24/2021 5.4  4.0 - 5.6 % Final    Comment: NEW METHOD  PLEASE NOTE NEW REFERENCE RANGE  (NOTE)  HbA1C Interpretive Ranges  <5.7              Normal  5.7 - 6.4 Consider Prediabetes  >6.5              Consider Diabetes      Est. average glucose 11/24/2021 108  mg/dL Final    Special Requests: 11/24/2021 NO SPECIAL REQUESTS    Final    Culture result: 11/24/2021 MRSA NOT PRESENT    Final           Office Visit on 11/24/2021   Component Date Value Ref Range Status    Cholesterol, total 11/24/2021 211* 100 - 199 mg/dL Final    Triglyceride 11/24/2021 177* 0 - 149 mg/dL Final    HDL Cholesterol 11/24/2021 45  >39 mg/dL Final    VLDL, calculated 11/24/2021 32  5 - 40 mg/dL Final    LDL, calculated 11/24/2021 134* 0 - 99 mg/dL Final    Prostate Specific Ag 11/24/2021 0.2  0.0 - 4.0 ng/mL Final    Comment: Roche ECLIA methodology. According to the American Urological Association, Serum PSA should  decrease and remain at undetectable levels after radical  prostatectomy. The AUA defines biochemical recurrence as an initial  PSA value 0.2 ng/mL or greater followed by a subsequent confirmatory  PSA value 0.2 ng/mL or greater. Values obtained with different assay methods or kits cannot be used  interchangeably. Results cannot be interpreted as absolute evidence  of the presence or absence of malignant disease.  SPECIMEN STATUS REPORT 11/24/2021 COMMENT   Final    Comment: No Test Indicated  A lavender top tube was received with no test indicated  Dear Doctor,  The requisition we received for the above patient has no test  indicated on the request form for one or more of the specimens  submitted.   The [place] Code of [employer] requires a  written and signed request be forwarded to the testing laboratory  following the verbal order of a laboratory test.  Date:_________________________________  ICD-9/10 Diagnosis Code(s):___________________________________________  Physician or Authorized Designee Signature:  ______________________________________________________________________  Your signature confirms your order of the test(s) listed  Required test name(s):________________________________________________  Required test number(s):______________________________________________  Please provide requested information and fax to [phone]  to expedite testing. Skin:     Denies open wounds, cuts, sores, rashes or other areas of concern in PAT assessment.         Berta Carroll, NP

## 2021-11-29 NOTE — PERIOP NOTES
Lab results and EKG faxed to surgeon's office via cc.     Lab results and EKG faxed to PCP via cc per pt's request

## 2021-11-30 ENCOUNTER — ANESTHESIA EVENT (OUTPATIENT)
Dept: SURGERY | Age: 51
DRG: 455 | End: 2021-11-30
Payer: COMMERCIAL

## 2021-11-30 ENCOUNTER — APPOINTMENT (OUTPATIENT)
Dept: GENERAL RADIOLOGY | Age: 51
DRG: 455 | End: 2021-11-30
Attending: ORTHOPAEDIC SURGERY
Payer: COMMERCIAL

## 2021-11-30 ENCOUNTER — ANESTHESIA (OUTPATIENT)
Dept: SURGERY | Age: 51
DRG: 455 | End: 2021-11-30
Payer: COMMERCIAL

## 2021-11-30 ENCOUNTER — HOSPITAL ENCOUNTER (INPATIENT)
Age: 51
LOS: 2 days | Discharge: HOME | DRG: 455 | End: 2021-12-02
Attending: ORTHOPAEDIC SURGERY | Admitting: ORTHOPAEDIC SURGERY
Payer: COMMERCIAL

## 2021-11-30 DIAGNOSIS — Z98.1 S/P LUMBAR FUSION: Primary | ICD-10-CM

## 2021-11-30 DIAGNOSIS — M43.16 SPONDYLOLISTHESIS OF LUMBAR REGION: ICD-10-CM

## 2021-11-30 PROBLEM — M43.10 SPONDYLOLISTHESIS: Status: ACTIVE | Noted: 2021-11-30

## 2021-11-30 LAB
GLUCOSE BLD STRIP.AUTO-MCNC: 109 MG/DL (ref 65–117)
SERVICE CMNT-IMP: NORMAL

## 2021-11-30 PROCEDURE — 77030026438 HC STYL ET INTUB CARD -A: Performed by: NURSE ANESTHETIST, CERTIFIED REGISTERED

## 2021-11-30 PROCEDURE — 74011000250 HC RX REV CODE- 250: Performed by: NURSE ANESTHETIST, CERTIFIED REGISTERED

## 2021-11-30 PROCEDURE — 74011250636 HC RX REV CODE- 250/636: Performed by: PHYSICIAN ASSISTANT

## 2021-11-30 PROCEDURE — 77030041393 HC GRFT BN PROT GRWTH FCTR BSYC -H: Performed by: ORTHOPAEDIC SURGERY

## 2021-11-30 PROCEDURE — 74011250636 HC RX REV CODE- 250/636: Performed by: ANESTHESIOLOGY

## 2021-11-30 PROCEDURE — 76210000016 HC OR PH I REC 1 TO 1.5 HR: Performed by: ORTHOPAEDIC SURGERY

## 2021-11-30 PROCEDURE — 74011250637 HC RX REV CODE- 250/637: Performed by: PHYSICIAN ASSISTANT

## 2021-11-30 PROCEDURE — 77030012893

## 2021-11-30 PROCEDURE — 76060000036 HC ANESTHESIA 2.5 TO 3 HR: Performed by: ORTHOPAEDIC SURGERY

## 2021-11-30 PROCEDURE — 77030031139 HC SUT VCRL2 J&J -A: Performed by: ORTHOPAEDIC SURGERY

## 2021-11-30 PROCEDURE — 22633 ARTHRD CMBN 1NTRSPC LUMBAR: CPT | Performed by: ORTHOPAEDIC SURGERY

## 2021-11-30 PROCEDURE — 77030029099 HC BN WAX SSPC -A: Performed by: ORTHOPAEDIC SURGERY

## 2021-11-30 PROCEDURE — 77030005513 HC CATH URETH FOL11 MDII -B: Performed by: ORTHOPAEDIC SURGERY

## 2021-11-30 PROCEDURE — 74011250636 HC RX REV CODE- 250/636: Performed by: STUDENT IN AN ORGANIZED HEALTH CARE EDUCATION/TRAINING PROGRAM

## 2021-11-30 PROCEDURE — 22840 INSERT SPINE FIXATION DEVICE: CPT | Performed by: PHYSICIAN ASSISTANT

## 2021-11-30 PROCEDURE — 77030013079 HC BLNKT BAIR HGGR 3M -A: Performed by: ANESTHESIOLOGY

## 2021-11-30 PROCEDURE — 77030040922 HC BLNKT HYPOTHRM STRY -A

## 2021-11-30 PROCEDURE — C1713 ANCHOR/SCREW BN/BN,TIS/BN: HCPCS | Performed by: ORTHOPAEDIC SURGERY

## 2021-11-30 PROCEDURE — 74011000250 HC RX REV CODE- 250: Performed by: PHYSICIAN ASSISTANT

## 2021-11-30 PROCEDURE — 22633 ARTHRD CMBN 1NTRSPC LUMBAR: CPT | Performed by: PHYSICIAN ASSISTANT

## 2021-11-30 PROCEDURE — 82962 GLUCOSE BLOOD TEST: CPT

## 2021-11-30 PROCEDURE — 77030038552 HC DRN WND MDII -A: Performed by: ORTHOPAEDIC SURGERY

## 2021-11-30 PROCEDURE — 22840 INSERT SPINE FIXATION DEVICE: CPT | Performed by: ORTHOPAEDIC SURGERY

## 2021-11-30 PROCEDURE — 2709999900 HC NON-CHARGEABLE SUPPLY: Performed by: ORTHOPAEDIC SURGERY

## 2021-11-30 PROCEDURE — 76010000172 HC OR TIME 2.5 TO 3 HR INTENSV-TIER 1: Performed by: ORTHOPAEDIC SURGERY

## 2021-11-30 PROCEDURE — 77030008684 HC TU ET CUF COVD -B: Performed by: NURSE ANESTHETIST, CERTIFIED REGISTERED

## 2021-11-30 PROCEDURE — 77030035236 HC SUT PDS STRATFX BARB J&J -B: Performed by: ORTHOPAEDIC SURGERY

## 2021-11-30 PROCEDURE — 74011250637 HC RX REV CODE- 250/637: Performed by: ANESTHESIOLOGY

## 2021-11-30 PROCEDURE — 74011250636 HC RX REV CODE- 250/636: Performed by: ORTHOPAEDIC SURGERY

## 2021-11-30 PROCEDURE — 74011250636 HC RX REV CODE- 250/636: Performed by: NURSE ANESTHETIST, CERTIFIED REGISTERED

## 2021-11-30 PROCEDURE — 77030034475 HC MISC IMPL SPN: Performed by: ORTHOPAEDIC SURGERY

## 2021-11-30 PROCEDURE — 77030041342 HC GRFT DBM FIBRS BTUS -G1: Performed by: ORTHOPAEDIC SURGERY

## 2021-11-30 PROCEDURE — 77030014650 HC SEAL MTRX FLOSEL BAXT -C: Performed by: ORTHOPAEDIC SURGERY

## 2021-11-30 PROCEDURE — 74011000250 HC RX REV CODE- 250: Performed by: STUDENT IN AN ORGANIZED HEALTH CARE EDUCATION/TRAINING PROGRAM

## 2021-11-30 PROCEDURE — 65270000029 HC RM PRIVATE

## 2021-11-30 PROCEDURE — 22853 INSJ BIOMECHANICAL DEVICE: CPT | Performed by: PHYSICIAN ASSISTANT

## 2021-11-30 PROCEDURE — 72100 X-RAY EXAM L-S SPINE 2/3 VWS: CPT

## 2021-11-30 PROCEDURE — 22853 INSJ BIOMECHANICAL DEVICE: CPT | Performed by: ORTHOPAEDIC SURGERY

## 2021-11-30 PROCEDURE — 77030037808 HC GRFT SPNG OSTEOAMP BTUS -H1: Performed by: ORTHOPAEDIC SURGERY

## 2021-11-30 PROCEDURE — 74011000250 HC RX REV CODE- 250: Performed by: ORTHOPAEDIC SURGERY

## 2021-11-30 PROCEDURE — 77030038600 HC TU BPLR IRR DISP STRY -B: Performed by: ORTHOPAEDIC SURGERY

## 2021-11-30 PROCEDURE — 77030004391 HC BUR FLUT MEDT -C: Performed by: ORTHOPAEDIC SURGERY

## 2021-11-30 DEVICE — GRAFT BNE FIBER 10 CC DBM CORTICAL PUREBONE: Type: IMPLANTABLE DEVICE | Site: SPINE LUMBAR | Status: FUNCTIONAL

## 2021-11-30 DEVICE — BONE GRAFT KIT 7510100 INFUSE X SMALL
Type: IMPLANTABLE DEVICE | Site: SPINE LUMBAR | Status: FUNCTIONAL
Brand: INFUSE® BONE GRAFT

## 2021-11-30 DEVICE — SCREW 55840006545 5.5/6 MAS 6.5X45 CC .
Type: IMPLANTABLE DEVICE | Site: SPINE LUMBAR | Status: FUNCTIONAL
Brand: CD HORIZON® SPINAL SYSTEM

## 2021-11-30 DEVICE — SPACER 6068076 CATALYFT PL LONG 7MM
Type: IMPLANTABLE DEVICE | Site: SPINE LUMBAR | Status: FUNCTIONAL
Brand: CATALYFT PL EXPANDABLE INTERBODY SYSTEM

## 2021-11-30 DEVICE — GRAFT BNE LG: Type: IMPLANTABLE DEVICE | Site: SPINE LUMBAR | Status: FUNCTIONAL

## 2021-11-30 DEVICE — GRAFT BNE SUB M W20XH7XL30MM COMPRESSIBLE SPNG STRP PROVIDE: Type: IMPLANTABLE DEVICE | Site: SPINE LUMBAR | Status: FUNCTIONAL

## 2021-11-30 RX ORDER — SODIUM CHLORIDE, SODIUM LACTATE, POTASSIUM CHLORIDE, CALCIUM CHLORIDE 600; 310; 30; 20 MG/100ML; MG/100ML; MG/100ML; MG/100ML
125 INJECTION, SOLUTION INTRAVENOUS CONTINUOUS
Status: DISCONTINUED | OUTPATIENT
Start: 2021-11-30 | End: 2021-11-30 | Stop reason: HOSPADM

## 2021-11-30 RX ORDER — OXYCODONE AND ACETAMINOPHEN 5; 325 MG/1; MG/1
1 TABLET ORAL AS NEEDED
Status: DISCONTINUED | OUTPATIENT
Start: 2021-11-30 | End: 2021-11-30 | Stop reason: HOSPADM

## 2021-11-30 RX ORDER — SODIUM CHLORIDE 9 MG/ML
50 INJECTION, SOLUTION INTRAVENOUS CONTINUOUS
Status: DISCONTINUED | OUTPATIENT
Start: 2021-11-30 | End: 2021-11-30 | Stop reason: HOSPADM

## 2021-11-30 RX ORDER — SODIUM CHLORIDE 0.9 % (FLUSH) 0.9 %
5-40 SYRINGE (ML) INJECTION EVERY 8 HOURS
Status: DISCONTINUED | OUTPATIENT
Start: 2021-11-30 | End: 2021-11-30 | Stop reason: HOSPADM

## 2021-11-30 RX ORDER — MIDAZOLAM HYDROCHLORIDE 1 MG/ML
1 INJECTION, SOLUTION INTRAMUSCULAR; INTRAVENOUS AS NEEDED
Status: DISCONTINUED | OUTPATIENT
Start: 2021-11-30 | End: 2021-11-30 | Stop reason: HOSPADM

## 2021-11-30 RX ORDER — FENTANYL CITRATE 50 UG/ML
50 INJECTION, SOLUTION INTRAMUSCULAR; INTRAVENOUS AS NEEDED
Status: DISCONTINUED | OUTPATIENT
Start: 2021-11-30 | End: 2021-11-30 | Stop reason: HOSPADM

## 2021-11-30 RX ORDER — ONDANSETRON 2 MG/ML
4 INJECTION INTRAMUSCULAR; INTRAVENOUS
Status: ACTIVE | OUTPATIENT
Start: 2021-11-30 | End: 2021-12-01

## 2021-11-30 RX ORDER — SUCCINYLCHOLINE CHLORIDE 20 MG/ML
INJECTION INTRAMUSCULAR; INTRAVENOUS AS NEEDED
Status: DISCONTINUED | OUTPATIENT
Start: 2021-11-30 | End: 2021-11-30 | Stop reason: HOSPADM

## 2021-11-30 RX ORDER — ONDANSETRON 2 MG/ML
INJECTION INTRAMUSCULAR; INTRAVENOUS AS NEEDED
Status: DISCONTINUED | OUTPATIENT
Start: 2021-11-30 | End: 2021-11-30 | Stop reason: HOSPADM

## 2021-11-30 RX ORDER — DEXAMETHASONE SODIUM PHOSPHATE 4 MG/ML
INJECTION, SOLUTION INTRA-ARTICULAR; INTRALESIONAL; INTRAMUSCULAR; INTRAVENOUS; SOFT TISSUE AS NEEDED
Status: DISCONTINUED | OUTPATIENT
Start: 2021-11-30 | End: 2021-11-30 | Stop reason: HOSPADM

## 2021-11-30 RX ORDER — CETIRIZINE HCL 10 MG
10 TABLET ORAL DAILY
Status: DISCONTINUED | OUTPATIENT
Start: 2021-12-01 | End: 2021-12-02 | Stop reason: HOSPADM

## 2021-11-30 RX ORDER — PROPOFOL 10 MG/ML
INJECTION, EMULSION INTRAVENOUS
Status: DISCONTINUED | OUTPATIENT
Start: 2021-11-30 | End: 2021-11-30 | Stop reason: HOSPADM

## 2021-11-30 RX ORDER — SODIUM CHLORIDE 9 MG/ML
1000 INJECTION, SOLUTION INTRAVENOUS CONTINUOUS
Status: DISCONTINUED | OUTPATIENT
Start: 2021-11-30 | End: 2021-11-30 | Stop reason: HOSPADM

## 2021-11-30 RX ORDER — SODIUM CHLORIDE 0.9 % (FLUSH) 0.9 %
5-40 SYRINGE (ML) INJECTION AS NEEDED
Status: DISCONTINUED | OUTPATIENT
Start: 2021-11-30 | End: 2021-12-02 | Stop reason: HOSPADM

## 2021-11-30 RX ORDER — SODIUM CHLORIDE 0.9 % (FLUSH) 0.9 %
5-40 SYRINGE (ML) INJECTION AS NEEDED
Status: DISCONTINUED | OUTPATIENT
Start: 2021-11-30 | End: 2021-11-30 | Stop reason: HOSPADM

## 2021-11-30 RX ORDER — MONTELUKAST SODIUM 10 MG/1
10 TABLET ORAL DAILY
Status: DISCONTINUED | OUTPATIENT
Start: 2021-12-01 | End: 2021-12-02 | Stop reason: HOSPADM

## 2021-11-30 RX ORDER — DEXMEDETOMIDINE HYDROCHLORIDE 100 UG/ML
INJECTION, SOLUTION INTRAVENOUS AS NEEDED
Status: DISCONTINUED | OUTPATIENT
Start: 2021-11-30 | End: 2021-11-30 | Stop reason: HOSPADM

## 2021-11-30 RX ORDER — SODIUM CHLORIDE, SODIUM LACTATE, POTASSIUM CHLORIDE, CALCIUM CHLORIDE 600; 310; 30; 20 MG/100ML; MG/100ML; MG/100ML; MG/100ML
INJECTION, SOLUTION INTRAVENOUS
Status: DISCONTINUED | OUTPATIENT
Start: 2021-11-30 | End: 2021-11-30 | Stop reason: HOSPADM

## 2021-11-30 RX ORDER — SODIUM CHLORIDE 9 MG/ML
125 INJECTION, SOLUTION INTRAVENOUS CONTINUOUS
Status: DISPENSED | OUTPATIENT
Start: 2021-11-30 | End: 2021-12-01

## 2021-11-30 RX ORDER — MORPHINE SULFATE 2 MG/ML
2 INJECTION, SOLUTION INTRAMUSCULAR; INTRAVENOUS
Status: ACTIVE | OUTPATIENT
Start: 2021-11-30 | End: 2021-12-01

## 2021-11-30 RX ORDER — FENTANYL CITRATE 50 UG/ML
INJECTION, SOLUTION INTRAMUSCULAR; INTRAVENOUS AS NEEDED
Status: DISCONTINUED | OUTPATIENT
Start: 2021-11-30 | End: 2021-11-30 | Stop reason: HOSPADM

## 2021-11-30 RX ORDER — AMOXICILLIN 250 MG
1 CAPSULE ORAL 2 TIMES DAILY
Status: DISCONTINUED | OUTPATIENT
Start: 2021-11-30 | End: 2021-12-02 | Stop reason: HOSPADM

## 2021-11-30 RX ORDER — HYDROMORPHONE HYDROCHLORIDE 1 MG/ML
0.2 INJECTION, SOLUTION INTRAMUSCULAR; INTRAVENOUS; SUBCUTANEOUS
Status: DISCONTINUED | OUTPATIENT
Start: 2021-11-30 | End: 2021-11-30 | Stop reason: HOSPADM

## 2021-11-30 RX ORDER — MIDAZOLAM HYDROCHLORIDE 1 MG/ML
INJECTION, SOLUTION INTRAMUSCULAR; INTRAVENOUS AS NEEDED
Status: DISCONTINUED | OUTPATIENT
Start: 2021-11-30 | End: 2021-11-30 | Stop reason: HOSPADM

## 2021-11-30 RX ORDER — CYCLOBENZAPRINE HCL 10 MG
10 TABLET ORAL
Status: DISCONTINUED | OUTPATIENT
Start: 2021-11-30 | End: 2021-12-02 | Stop reason: HOSPADM

## 2021-11-30 RX ORDER — MORPHINE SULFATE IN 0.9 % NACL 150MG/30ML
PATIENT CONTROLLED ANALGESIA SYRINGE INTRAVENOUS
Status: DISCONTINUED | OUTPATIENT
Start: 2021-11-30 | End: 2021-12-01

## 2021-11-30 RX ORDER — EPHEDRINE SULFATE/0.9% NACL/PF 50 MG/5 ML
5 SYRINGE (ML) INTRAVENOUS AS NEEDED
Status: DISCONTINUED | OUTPATIENT
Start: 2021-11-30 | End: 2021-11-30 | Stop reason: HOSPADM

## 2021-11-30 RX ORDER — MEMANTINE HYDROCHLORIDE 5 MG/1
5 TABLET ORAL 2 TIMES DAILY
Status: DISCONTINUED | OUTPATIENT
Start: 2021-11-30 | End: 2021-12-01

## 2021-11-30 RX ORDER — PHENYLEPHRINE HCL IN 0.9% NACL 0.4MG/10ML
SYRINGE (ML) INTRAVENOUS AS NEEDED
Status: DISCONTINUED | OUTPATIENT
Start: 2021-11-30 | End: 2021-11-30 | Stop reason: HOSPADM

## 2021-11-30 RX ORDER — ROCURONIUM BROMIDE 10 MG/ML
INJECTION, SOLUTION INTRAVENOUS AS NEEDED
Status: DISCONTINUED | OUTPATIENT
Start: 2021-11-30 | End: 2021-11-30 | Stop reason: HOSPADM

## 2021-11-30 RX ORDER — FACIAL-BODY WIPES
10 EACH TOPICAL DAILY PRN
Status: DISCONTINUED | OUTPATIENT
Start: 2021-12-02 | End: 2021-12-02 | Stop reason: HOSPADM

## 2021-11-30 RX ORDER — MORPHINE SULFATE 2 MG/ML
2 INJECTION, SOLUTION INTRAMUSCULAR; INTRAVENOUS
Status: DISCONTINUED | OUTPATIENT
Start: 2021-11-30 | End: 2021-11-30 | Stop reason: HOSPADM

## 2021-11-30 RX ORDER — FENTANYL CITRATE 50 UG/ML
25 INJECTION, SOLUTION INTRAMUSCULAR; INTRAVENOUS
Status: COMPLETED | OUTPATIENT
Start: 2021-11-30 | End: 2021-11-30

## 2021-11-30 RX ORDER — OXYCODONE HYDROCHLORIDE 5 MG/1
5 TABLET ORAL
Status: DISCONTINUED | OUTPATIENT
Start: 2021-11-30 | End: 2021-12-02 | Stop reason: HOSPADM

## 2021-11-30 RX ORDER — OXYCODONE HYDROCHLORIDE 5 MG/1
10 TABLET ORAL
Status: DISCONTINUED | OUTPATIENT
Start: 2021-11-30 | End: 2021-12-02 | Stop reason: HOSPADM

## 2021-11-30 RX ORDER — LIDOCAINE HYDROCHLORIDE 20 MG/ML
INJECTION, SOLUTION EPIDURAL; INFILTRATION; INTRACAUDAL; PERINEURAL AS NEEDED
Status: DISCONTINUED | OUTPATIENT
Start: 2021-11-30 | End: 2021-11-30 | Stop reason: HOSPADM

## 2021-11-30 RX ORDER — ALBUTEROL SULFATE 0.83 MG/ML
2.5 SOLUTION RESPIRATORY (INHALATION)
Status: DISCONTINUED | OUTPATIENT
Start: 2021-11-30 | End: 2021-12-02 | Stop reason: HOSPADM

## 2021-11-30 RX ORDER — DIPHENHYDRAMINE HYDROCHLORIDE 50 MG/ML
12.5 INJECTION, SOLUTION INTRAMUSCULAR; INTRAVENOUS
Status: ACTIVE | OUTPATIENT
Start: 2021-11-30 | End: 2021-12-01

## 2021-11-30 RX ORDER — POLYETHYLENE GLYCOL 3350 17 G/17G
17 POWDER, FOR SOLUTION ORAL DAILY
Status: DISCONTINUED | OUTPATIENT
Start: 2021-12-01 | End: 2021-12-02 | Stop reason: HOSPADM

## 2021-11-30 RX ORDER — LISINOPRIL 20 MG/1
20 TABLET ORAL DAILY
Status: DISCONTINUED | OUTPATIENT
Start: 2021-12-01 | End: 2021-12-02 | Stop reason: HOSPADM

## 2021-11-30 RX ORDER — VANCOMYCIN HYDROCHLORIDE 1 G/20ML
INJECTION, POWDER, LYOPHILIZED, FOR SOLUTION INTRAVENOUS AS NEEDED
Status: DISCONTINUED | OUTPATIENT
Start: 2021-11-30 | End: 2021-11-30 | Stop reason: HOSPADM

## 2021-11-30 RX ORDER — NALOXONE HYDROCHLORIDE 0.4 MG/ML
0.4 INJECTION, SOLUTION INTRAMUSCULAR; INTRAVENOUS; SUBCUTANEOUS AS NEEDED
Status: DISCONTINUED | OUTPATIENT
Start: 2021-11-30 | End: 2021-12-02 | Stop reason: HOSPADM

## 2021-11-30 RX ORDER — KETOROLAC TROMETHAMINE 30 MG/ML
30 INJECTION, SOLUTION INTRAMUSCULAR; INTRAVENOUS EVERY 6 HOURS
Status: COMPLETED | OUTPATIENT
Start: 2021-11-30 | End: 2021-12-01

## 2021-11-30 RX ORDER — ACETAMINOPHEN 325 MG/1
650 TABLET ORAL ONCE
Status: COMPLETED | OUTPATIENT
Start: 2021-11-30 | End: 2021-11-30

## 2021-11-30 RX ORDER — LIDOCAINE HYDROCHLORIDE 10 MG/ML
0.1 INJECTION, SOLUTION EPIDURAL; INFILTRATION; INTRACAUDAL; PERINEURAL AS NEEDED
Status: DISCONTINUED | OUTPATIENT
Start: 2021-11-30 | End: 2021-11-30 | Stop reason: HOSPADM

## 2021-11-30 RX ORDER — HYDROMORPHONE HYDROCHLORIDE 2 MG/ML
INJECTION, SOLUTION INTRAMUSCULAR; INTRAVENOUS; SUBCUTANEOUS AS NEEDED
Status: DISCONTINUED | OUTPATIENT
Start: 2021-11-30 | End: 2021-11-30 | Stop reason: HOSPADM

## 2021-11-30 RX ORDER — DIPHENHYDRAMINE HYDROCHLORIDE 50 MG/ML
12.5 INJECTION, SOLUTION INTRAMUSCULAR; INTRAVENOUS AS NEEDED
Status: DISCONTINUED | OUTPATIENT
Start: 2021-11-30 | End: 2021-11-30 | Stop reason: HOSPADM

## 2021-11-30 RX ORDER — VENLAFAXINE HYDROCHLORIDE 150 MG/1
150 CAPSULE, EXTENDED RELEASE ORAL
Status: DISCONTINUED | OUTPATIENT
Start: 2021-12-01 | End: 2021-12-02 | Stop reason: HOSPADM

## 2021-11-30 RX ORDER — LANOLIN ALCOHOL/MO/W.PET/CERES
5 CREAM (GRAM) TOPICAL
Status: DISCONTINUED | OUTPATIENT
Start: 2021-11-30 | End: 2021-12-02 | Stop reason: HOSPADM

## 2021-11-30 RX ORDER — ACETAMINOPHEN 500 MG
1000 TABLET ORAL EVERY 6 HOURS
Status: DISCONTINUED | OUTPATIENT
Start: 2021-11-30 | End: 2021-12-02 | Stop reason: HOSPADM

## 2021-11-30 RX ORDER — AMLODIPINE BESYLATE 5 MG/1
5 TABLET ORAL DAILY
Status: DISCONTINUED | OUTPATIENT
Start: 2021-12-01 | End: 2021-12-02 | Stop reason: HOSPADM

## 2021-11-30 RX ORDER — ONDANSETRON 2 MG/ML
4 INJECTION INTRAMUSCULAR; INTRAVENOUS AS NEEDED
Status: DISCONTINUED | OUTPATIENT
Start: 2021-11-30 | End: 2021-11-30 | Stop reason: HOSPADM

## 2021-11-30 RX ORDER — LANOLIN ALCOHOL/MO/W.PET/CERES
400 CREAM (GRAM) TOPICAL
Status: DISCONTINUED | OUTPATIENT
Start: 2021-11-30 | End: 2021-12-02 | Stop reason: HOSPADM

## 2021-11-30 RX ORDER — FLUTICASONE PROPIONATE 50 MCG
1 SPRAY, SUSPENSION (ML) NASAL 2 TIMES DAILY
Status: DISCONTINUED | OUTPATIENT
Start: 2021-11-30 | End: 2021-12-02 | Stop reason: HOSPADM

## 2021-11-30 RX ORDER — LOPERAMIDE HYDROCHLORIDE 2 MG/1
2 CAPSULE ORAL
Status: DISCONTINUED | OUTPATIENT
Start: 2021-11-30 | End: 2021-12-02 | Stop reason: HOSPADM

## 2021-11-30 RX ORDER — KETAMINE HYDROCHLORIDE 10 MG/ML
INJECTION, SOLUTION INTRAMUSCULAR; INTRAVENOUS AS NEEDED
Status: DISCONTINUED | OUTPATIENT
Start: 2021-11-30 | End: 2021-11-30 | Stop reason: HOSPADM

## 2021-11-30 RX ORDER — SODIUM CHLORIDE 0.9 % (FLUSH) 0.9 %
5-40 SYRINGE (ML) INJECTION EVERY 8 HOURS
Status: DISCONTINUED | OUTPATIENT
Start: 2021-11-30 | End: 2021-12-02 | Stop reason: HOSPADM

## 2021-11-30 RX ORDER — PROPOFOL 10 MG/ML
INJECTION, EMULSION INTRAVENOUS AS NEEDED
Status: DISCONTINUED | OUTPATIENT
Start: 2021-11-30 | End: 2021-11-30 | Stop reason: HOSPADM

## 2021-11-30 RX ORDER — MIDAZOLAM HYDROCHLORIDE 1 MG/ML
0.5 INJECTION, SOLUTION INTRAMUSCULAR; INTRAVENOUS
Status: DISCONTINUED | OUTPATIENT
Start: 2021-11-30 | End: 2021-11-30 | Stop reason: HOSPADM

## 2021-11-30 RX ADMIN — ACETAMINOPHEN 1000 MG: 500 TABLET ORAL at 18:15

## 2021-11-30 RX ADMIN — HYDROMORPHONE HYDROCHLORIDE 0.2 MG: 1 INJECTION, SOLUTION INTRAMUSCULAR; INTRAVENOUS; SUBCUTANEOUS at 15:44

## 2021-11-30 RX ADMIN — FENTANYL CITRATE 25 MCG: 50 INJECTION INTRAMUSCULAR; INTRAVENOUS at 15:59

## 2021-11-30 RX ADMIN — FENTANYL CITRATE 100 MCG: 50 INJECTION, SOLUTION INTRAMUSCULAR; INTRAVENOUS at 12:42

## 2021-11-30 RX ADMIN — ROCURONIUM BROMIDE 5 MG: 10 SOLUTION INTRAVENOUS at 12:42

## 2021-11-30 RX ADMIN — SODIUM CHLORIDE, POTASSIUM CHLORIDE, SODIUM LACTATE AND CALCIUM CHLORIDE: 600; 310; 30; 20 INJECTION, SOLUTION INTRAVENOUS at 12:17

## 2021-11-30 RX ADMIN — FLUTICASONE PROPIONATE 1 SPRAY: 50 SPRAY, METERED NASAL at 18:15

## 2021-11-30 RX ADMIN — ACETAMINOPHEN 650 MG: 325 TABLET ORAL at 12:20

## 2021-11-30 RX ADMIN — PHENYLEPHRINE HYDROCHLORIDE 25 MCG/MIN: 10 INJECTION INTRAVENOUS at 12:45

## 2021-11-30 RX ADMIN — KETOROLAC TROMETHAMINE 30 MG: 30 INJECTION, SOLUTION INTRAMUSCULAR; INTRAVENOUS at 18:15

## 2021-11-30 RX ADMIN — HYDROMORPHONE HYDROCHLORIDE 0.2 MG: 1 INJECTION, SOLUTION INTRAMUSCULAR; INTRAVENOUS; SUBCUTANEOUS at 16:09

## 2021-11-30 RX ADMIN — PROPOFOL 170 MG: 10 INJECTION, EMULSION INTRAVENOUS at 12:42

## 2021-11-30 RX ADMIN — FENTANYL CITRATE 25 MCG: 50 INJECTION INTRAMUSCULAR; INTRAVENOUS at 16:15

## 2021-11-30 RX ADMIN — CEFAZOLIN SODIUM 2 G: 1 INJECTION, POWDER, FOR SOLUTION INTRAMUSCULAR; INTRAVENOUS at 21:19

## 2021-11-30 RX ADMIN — SODIUM CHLORIDE, POTASSIUM CHLORIDE, SODIUM LACTATE AND CALCIUM CHLORIDE 125 ML/HR: 600; 310; 30; 20 INJECTION, SOLUTION INTRAVENOUS at 12:26

## 2021-11-30 RX ADMIN — WATER 2 G: 1 INJECTION INTRAMUSCULAR; INTRAVENOUS; SUBCUTANEOUS at 13:00

## 2021-11-30 RX ADMIN — HYDROMORPHONE HYDROCHLORIDE 0.5 MG: 2 INJECTION, SOLUTION INTRAMUSCULAR; INTRAVENOUS; SUBCUTANEOUS at 14:51

## 2021-11-30 RX ADMIN — SUCCINYLCHOLINE CHLORIDE 160 MG: 20 INJECTION, SOLUTION INTRAMUSCULAR; INTRAVENOUS at 12:42

## 2021-11-30 RX ADMIN — KETAMINE HYDROCHLORIDE 20 MG: 10 INJECTION, SOLUTION INTRAMUSCULAR; INTRAVENOUS at 12:49

## 2021-11-30 RX ADMIN — DOCUSATE SODIUM 50 MG AND SENNOSIDES 8.6 MG 1 TABLET: 8.6; 5 TABLET, FILM COATED ORAL at 18:15

## 2021-11-30 RX ADMIN — HYDROMORPHONE HYDROCHLORIDE 0.5 MG: 2 INJECTION, SOLUTION INTRAMUSCULAR; INTRAVENOUS; SUBCUTANEOUS at 13:04

## 2021-11-30 RX ADMIN — DEXMEDETOMIDINE HYDROCHLORIDE 4 MCG: 100 INJECTION, SOLUTION, CONCENTRATE INTRAVENOUS at 14:55

## 2021-11-30 RX ADMIN — PROPOFOL 125 MCG/KG/MIN: 10 INJECTION, EMULSION INTRAVENOUS at 12:45

## 2021-11-30 RX ADMIN — ONDANSETRON HYDROCHLORIDE 4 MG: 2 INJECTION, SOLUTION INTRAMUSCULAR; INTRAVENOUS at 12:47

## 2021-11-30 RX ADMIN — MIDAZOLAM 2 MG: 1 INJECTION INTRAMUSCULAR; INTRAVENOUS at 12:33

## 2021-11-30 RX ADMIN — HYDROMORPHONE HYDROCHLORIDE 0.2 MG: 1 INJECTION, SOLUTION INTRAMUSCULAR; INTRAVENOUS; SUBCUTANEOUS at 15:57

## 2021-11-30 RX ADMIN — HYDROMORPHONE HYDROCHLORIDE 0.5 MG: 2 INJECTION, SOLUTION INTRAMUSCULAR; INTRAVENOUS; SUBCUTANEOUS at 14:58

## 2021-11-30 RX ADMIN — LIDOCAINE HYDROCHLORIDE 100 MG: 20 INJECTION, SOLUTION EPIDURAL; INFILTRATION; INTRACAUDAL; PERINEURAL at 12:42

## 2021-11-30 RX ADMIN — DEXAMETHASONE SODIUM PHOSPHATE 4 MG: 4 INJECTION, SOLUTION INTRAMUSCULAR; INTRAVENOUS at 12:47

## 2021-11-30 RX ADMIN — FENTANYL CITRATE 25 MCG: 50 INJECTION INTRAMUSCULAR; INTRAVENOUS at 16:05

## 2021-11-30 RX ADMIN — Medication 80 MCG: at 13:23

## 2021-11-30 RX ADMIN — Medication 10 ML: at 21:20

## 2021-11-30 RX ADMIN — HYDROMORPHONE HYDROCHLORIDE 0.2 MG: 1 INJECTION, SOLUTION INTRAMUSCULAR; INTRAVENOUS; SUBCUTANEOUS at 16:22

## 2021-11-30 RX ADMIN — SODIUM CHLORIDE 125 ML/HR: 9 INJECTION, SOLUTION INTRAVENOUS at 15:26

## 2021-11-30 RX ADMIN — Medication: at 16:00

## 2021-11-30 RX ADMIN — FENTANYL CITRATE 25 MCG: 50 INJECTION INTRAMUSCULAR; INTRAVENOUS at 16:10

## 2021-11-30 NOTE — ANESTHESIA POSTPROCEDURE EVALUATION
Post-Anesthesia Evaluation and Assessment    Patient: Gwen Sharp MRN: [id number]  SSN: [id number]    YOB: 1970  Age: 45 y.o. Sex: male      I have evaluated the patient and they are stable and ready for discharge from the PACU. Cardiovascular Function/Vital Signs  Visit Vitals  /74   Pulse 82   Temp 36.4 °C (97.6 °F)   Resp 15   Ht 6' (1.829 m)   Wt 107 kg (235 lb 14.3 oz)   SpO2 92%   BMI 31.99 kg/m²       Patient is status post General anesthesia for Procedure(s):  L5-S1 LUMBAR LAMINECTOMY WITH POSTERIOR LUMBAR FUSION (O ARM/S8). Nausea/Vomiting: None    Postoperative hydration reviewed and adequate. Pain:  Pain Scale 1: Numeric (0 - 10) (11/30/21 1127)  Pain Intensity 1: 4 (11/30/21 1127)   Managed    Neurological Status:   Neuro (WDL): Exceptions to WDL (11/30/21 1145)  Neuro  LUE Motor Response: Tremorous (11/30/21 1145)  RUE Motor Response: Tremorous (11/30/21 1145)   At baseline    Mental Status, Level of Consciousness: Alert and  oriented to person, place, and time    Pulmonary Status:   O2 Device: CO2 nasal cannula; Oral airway (11/30/21 1517)   Adequate oxygenation and airway patent    Complications related to anesthesia: None    Post-anesthesia assessment completed.  No concerns    Signed By: Jennifer Zhang MD     November 30, 2021

## 2021-11-30 NOTE — DISCHARGE INSTRUCTIONS
After Hospital Care Plan:  Discharge Instructions Lumbar Fusion Surgery   Dr. Elise Sullivan   Patient Name: Kelly Mccarthy    Date of procedure: 11/30/2021  Date of discharge: 11/30/2021    Procedure: Procedure(s):  L5-S1 LUMBAR LAMINECTOMY WITH POSTERIOR LUMBAR FUSION (O ARM/S8)  PCP: Ector Frost MD    Follow up appointments  -follow up with Dr. Dr. Elise Sullivan in 2 weeks. Call [phone] to make an appointment as soon as you get home from the hospital.    [address] [place], [place] [address]y: ____________________   phone: _______________________  The agency will contact you to arrange dates/times for visits. Please call them if you do not hear from them within 24 hours after you are discharged  Physical therapy 3 times a week for 3 weeks  Nursing-initial assessment and as needed    When to call your Orthopaedic Surgeon:  -Signs of infection-if your incision is red; continues to have drainage; drainage has a foul odor or if you have a persistent fever over 101 degrees for 24 hours  -Nausea or vomiting, severe headache  -Loss of bowel or bladder function, inability to urinate  -Changes in sensation in your arms or legs (numbness, tingling, loss of color)  -Increased weakness-greater than before your surgery  -Severe pain or pain not relieved by medications  -Signs of a blood clot in your leg-calf pain, tenderness, redness, swelling of lower leg    When to call your Primary Care Physician:  -Concerns about medical conditions such as diabetes, high blood pressure, asthma, congestive heart failure  -Call if blood sugars are elevated, persistent headache or dizziness, coughing or congestion, constipation or diarrhea, burning with urination, abnormal heart rate    When to call 911 and go to the nearest emergency room:  -Acute onset of chest pain, shortness of breath, difficulty breathing    Activity  -You are going home a well person, be as active as possible. Your only exercise should be walking.   Start with short frequent walks and increase your walking distance each day.  -Limit the amount of time you sit to 20-30 minute intervals. Sitting for prolonged periods of time will be uncomfortable for you following surgery.  -Do NOT lift anything over 5 pounds  -Do NOT do any straining, twisting or bending  -When you are in bed, you may lay on your back or on either side. Do NOT lie on your stomach    Brace  -If you have a back brace, you should wear your brace at all times when you are out of bed. Do not wear the brace while in bed or showering.  -Remember to always wear a cotton t-shirt underneath your brace.  -Do not bend or twist when your brace is off    Diet  -Resume usual diet; drink plenty of fluids; eat foods high in fiber  -It is important to have regular bowel movements. Pain medications may cause constipation. You may want to take a stool softener (such as Senokot-S or Colace) to prevent constipation.   -If constipation occurs, take a laxative (such as Dulcolax tablets, Milk of Magnesia, or a suppository). Laxatives should only be used if the above preventable measures have failed and you still have not had a bowel movement after three days    Driving  -You may not drive or return to work until instructed by your physician. However, you may ride in the car for short periods of time. Incision Care  Your incision has been closed with absorbable sutures and the Zipline skin closure system. This will assist with healing. The Nahum Kourtney is to remain on your incision for 2 weeks. A dry dressing (ABD and tape) will be placed over it and should be changed daily, for at least the first several days after your surgery. If you have no incisional drainage, you may leave the incision open to air if you wish, still leaving the Zipline in place. Please make sure to wash your hands prior to touching your dressing. You may take brief showers but do not run the water directly onto the wound.  After your shower, blot your incision dry with a soft towel and replace the dry dressing. Do not allow the tape to come in contact with the Zipline. Do not rub or apply any lotions or ointments to your incision site. Do not soak or scrub your wound. The Zipline dressing will be removed during your two week follow-up appointment. If you experience drainage leaking from underneath the Zipline or if it peels off before 2 weeks, please contact your orthopedic surgeons office. Showering  -You may shower in approximately 4 days after your surgery.    -Leave the dressing on during your shower. Do NOT allow the water to run directly onto your dressing. Once you get out of the shower, gently pat the dressing dry. -Reminder- your brace can be removed while showering. Remember to not bend or twist while your brace is off.    -Do not take a tub bath. Preventing blood clots  -You have been given T.E.D. stockings to wear. Continue to wear these for 7 days after your discharge. Put them on in the morning and take them off at night.    -They are used to increase your circulation and prevent blood clots from forming in your legs  -T. E.D. stockings can be machine washed, temperature not to exceed 160° F (71°C) and machine dried for 15 to 20 minutes, temperature not to exceed 250° F (121°C). Pain management  -Take pain medication as prescribed; decrease the amount you use as your pain lessens  -DO not wait until you are in extreme pain to take your medication.  -Avoid alcoholic beverages while taking pain medication    Pain Medication Safety  DO:  -Read the Medication Guide   -Take your medicine exactly as prescribed   -Store your medicine away from children and in a safe place   -Flush unused medicine down the toilet   -Call your healthcare provider for medical advice about side effects. You may report side effects to FDA at 4-940-FDA-1618.   -Please be aware that many medications contain Tylenol.   We do not want you to over medicate so please read the information below as a guide. Do not take more than 4 Grams of Tylenol in a 24 hour period. (There are 1000 milligrams in one Gram)                                                                                                                                                                                                                                                Percocet contains 325 mg of Tylenol per tablet (do not take more than 12 tablets in 24 hours)  Lortab contains 500 mg of Tylenol per tablet (do not take more than 8 tablets in 24 hours)  Norco contains 325 mg of Tylenol per tablet (do not take more than 12 tablets in 24 hours). DO NOT:  -Do not give your medicine to others   -Do not take medicine unless it was prescribed for you   -Do not stop taking your medicine without talking to your healthcare provider   -Do not break, chew, crush, dissolve, or inject your medicine. If you cannot swallow your medicine whole, talk to your healthcare provider.  -Do not drink alcohol while taking this medicine  -Do not take anti-inflammatory medications or aspirin unless instructed by your     physician.

## 2021-11-30 NOTE — BRIEF OP NOTE
Brief Postoperative Note    Patient: Avial Beebe  YOB: 1970  MRN: [id number]    Date of Procedure: 11/30/2021     Pre-Op Diagnosis: SPINAL STENOSIS    Post-Op Diagnosis: Same as preoperative diagnosis. Procedure(s):  L5-S1 LUMBAR LAMINECTOMY WITH POSTERIOR LUMBAR FUSION (O ARM/S8)    Surgeon(s):  Walter Brantley MD    Surgical Assistant: Physician Assistant: GERSON Medina    Anesthesia: General     Estimated Blood Loss (mL): 433BV    Complications: None    Specimens: * No specimens in log *     Implants:   Implant Name Type Inv. Item Serial No.  Lot No. LRB No. Used Action   GRAFT BNE SUB M [id number] COMPRESSIBLE SPNG STRP PROVIDE - [id number]  GRAFT BNE SUB M [id number] COMPRESSIBLE SPNG STRP PROVIDE [id number] [employer] N/A N/A 1 Implanted   GRAFT BNE FIBER 10 CC DBM CORTICAL PUREBONE - [id number]  GRAFT BNE FIBER 10 CC DBM CORTICAL PUREBONE [id number] [employer]WD N/A N/A 1 Implanted   GRAFT BNE LG - [id number]  GRAFT BNE LG [id number] [employer] N/A N/A 1 Implanted   KIT BNE GRFT XSM 1.4CC RHBMP-2 ABSRB CLLGN SPNG INFUSE - SN/A  KIT BNE GRFT XSM 1.4CC RHBMP-2 ABSRB CLLGN SPNG INFUSE N/A [employer] SOFAMOR DANEK_WD [id number] N/A 1 Implanted   Interbody Cage   N/A [employer] [id number] N/A 2 Implanted   SCREW SPNL L45MM DIA6. 5MM POST THORACOLUMBOSACRAL CO CHROM - SN/A  SCREW SPNL L45MM DIA6. 5MM POST THORACOLUMBOSACRAL CO CHROM N/A [employer] SOFAMOR DANEK_WD N/A N/A 2 Implanted   SCREW SPNL L45MM DIA7. 5MM POST THORACOLUMBOSACRAL CO CHROM - SN/A  SCREW SPNL L45MM DIA7. 5MM POST THORACOLUMBOSACRAL CO CHROM N/A [employer] SOFAMOR DANEK_WD N/A N/A 2 Implanted   SET SCR SPNL L6MM DIA5. 5MM TI BRK OFF LORENE W/ DETACH [address] - SN/A  SET SCR SPNL L6MM DIA5. 5MM TI BRK OFF LORENE W/ DETACH [id number] N/A [employer] SOFAMOR DANEK[employer]WD N/A N/A 4 Implanted   MEGHA SPNL L35MM DIA5. 5MM ANT POST THORACOLUMBOSACRAL TI - SN/A  MEGHA SPNL L35MM DIA5. 5MM ANT POST THORACOLUMBOSACRAL TI N/A [employer] SOFAMOR DANEK[employer]WD N/A N/A 1 Implanted   MEGHA SPNL L40MM DIA5. 5MM ANT POST THORACOLUMBOSACRAL TI - SN/A  MEGHA SPNL L40MM DIA5. 5MM ANT POST THORACOLUMBOSACRAL TI N/A [employer] SOFAMOR DANEK_WD N/A N/A 1 Implanted       Drains: * No LDAs found *    Findings: stenosis, spondylolisthesis    Electronically Signed by GERSON Magana on 11/30/2021 at 3:00 PM

## 2021-11-30 NOTE — ANESTHESIA PREPROCEDURE EVALUATION
Relevant Problems   RESPIRATORY SYSTEM   (+) Asthma      NEUROLOGY   (+) Intractable chronic migraine without aura   (+) Migraine   (+) Post concussion syndrome   (+) Sinus headache   (+) Tension headache      CARDIOVASCULAR   (+) Essential hypertension       Anesthetic History   No history of anesthetic complications            Review of Systems / Medical History  Patient summary reviewed, nursing notes reviewed and pertinent labs reviewed    Pulmonary  Within defined limits          Asthma        Neuro/Psych         Psychiatric history     Cardiovascular  Within defined limits  Hypertension                   GI/Hepatic/Renal     GERD           Endo/Other  Within defined limits           Other Findings   Comments: Raynaud           Physical Exam    Airway  Mallampati: II  TM Distance: 4 - 6 cm  Neck ROM: normal range of motion   Mouth opening: Normal     Cardiovascular  Regular rate and rhythm,  S1 and S2 normal,  no murmur, click, rub, or gallop             Dental  No notable dental hx       Pulmonary  Breath sounds clear to auscultation               Abdominal  GI exam deferred       Other Findings            Anesthetic Plan    ASA: 2  Anesthesia type: general          Induction: Intravenous  Anesthetic plan and risks discussed with: Patient

## 2021-12-01 LAB
ANION GAP SERPL CALC-SCNC: 10 MMOL/L (ref 5–15)
BUN SERPL-MCNC: 14 MG/DL (ref 6–20)
BUN/CREAT SERPL: 18 (ref 12–20)
CALCIUM SERPL-MCNC: 8.6 MG/DL (ref 8.5–10.1)
CHLORIDE SERPL-SCNC: 108 MMOL/L (ref 97–108)
CO2 SERPL-SCNC: 17 MMOL/L (ref 21–32)
CREAT SERPL-MCNC: 0.8 MG/DL (ref 0.7–1.3)
GLUCOSE SERPL-MCNC: 123 MG/DL (ref 65–100)
HGB BLD-MCNC: 13.5 G/DL (ref 12.1–17)
POTASSIUM SERPL-SCNC: 4.4 MMOL/L (ref 3.5–5.1)
SODIUM SERPL-SCNC: 135 MMOL/L (ref 136–145)

## 2021-12-01 PROCEDURE — 74011000250 HC RX REV CODE- 250: Performed by: PHYSICIAN ASSISTANT

## 2021-12-01 PROCEDURE — 97116 GAIT TRAINING THERAPY: CPT

## 2021-12-01 PROCEDURE — 74011250637 HC RX REV CODE- 250/637: Performed by: PHYSICIAN ASSISTANT

## 2021-12-01 PROCEDURE — 80048 BASIC METABOLIC PNL TOTAL CA: CPT

## 2021-12-01 PROCEDURE — 74011250636 HC RX REV CODE- 250/636: Performed by: PHYSICIAN ASSISTANT

## 2021-12-01 PROCEDURE — 97535 SELF CARE MNGMENT TRAINING: CPT

## 2021-12-01 PROCEDURE — 97161 PT EVAL LOW COMPLEX 20 MIN: CPT

## 2021-12-01 PROCEDURE — 85018 HEMOGLOBIN: CPT

## 2021-12-01 PROCEDURE — 36415 COLL VENOUS BLD VENIPUNCTURE: CPT

## 2021-12-01 PROCEDURE — 97165 OT EVAL LOW COMPLEX 30 MIN: CPT

## 2021-12-01 PROCEDURE — 65270000029 HC RM PRIVATE

## 2021-12-01 RX ORDER — MEMANTINE HYDROCHLORIDE 5 MG/1
5 TABLET ORAL DAILY
Status: DISCONTINUED | OUTPATIENT
Start: 2021-12-02 | End: 2021-12-02 | Stop reason: HOSPADM

## 2021-12-01 RX ADMIN — OXYCODONE 10 MG: 5 TABLET ORAL at 07:55

## 2021-12-01 RX ADMIN — KETOROLAC TROMETHAMINE 30 MG: 30 INJECTION, SOLUTION INTRAMUSCULAR; INTRAVENOUS at 06:42

## 2021-12-01 RX ADMIN — KETOROLAC TROMETHAMINE 30 MG: 30 INJECTION, SOLUTION INTRAMUSCULAR; INTRAVENOUS at 00:26

## 2021-12-01 RX ADMIN — ACETAMINOPHEN 1000 MG: 500 TABLET ORAL at 23:11

## 2021-12-01 RX ADMIN — DOCUSATE SODIUM 50 MG AND SENNOSIDES 8.6 MG 1 TABLET: 8.6; 5 TABLET, FILM COATED ORAL at 09:00

## 2021-12-01 RX ADMIN — ACETAMINOPHEN 1000 MG: 500 TABLET ORAL at 00:26

## 2021-12-01 RX ADMIN — ACETAMINOPHEN 1000 MG: 500 TABLET ORAL at 06:43

## 2021-12-01 RX ADMIN — CEFAZOLIN SODIUM 2 G: 1 INJECTION, POWDER, FOR SOLUTION INTRAMUSCULAR; INTRAVENOUS at 04:58

## 2021-12-01 RX ADMIN — KETOROLAC TROMETHAMINE 30 MG: 30 INJECTION, SOLUTION INTRAMUSCULAR; INTRAVENOUS at 11:00

## 2021-12-01 RX ADMIN — VENLAFAXINE HYDROCHLORIDE 150 MG: 150 CAPSULE, EXTENDED RELEASE ORAL at 07:55

## 2021-12-01 RX ADMIN — Medication 10 ML: at 14:00

## 2021-12-01 RX ADMIN — FLUTICASONE PROPIONATE 1 SPRAY: 50 SPRAY, METERED NASAL at 17:09

## 2021-12-01 RX ADMIN — FLUTICASONE PROPIONATE 1 SPRAY: 50 SPRAY, METERED NASAL at 09:01

## 2021-12-01 RX ADMIN — Medication 4.5 MG: at 00:25

## 2021-12-01 RX ADMIN — Medication 4.5 MG: at 20:14

## 2021-12-01 RX ADMIN — OXYCODONE 5 MG: 5 TABLET ORAL at 10:57

## 2021-12-01 RX ADMIN — ACETAMINOPHEN 1000 MG: 500 TABLET ORAL at 17:10

## 2021-12-01 RX ADMIN — Medication 10 ML: at 06:42

## 2021-12-01 RX ADMIN — OXYCODONE 10 MG: 5 TABLET ORAL at 20:14

## 2021-12-01 RX ADMIN — MEMANTINE HYDROCHLORIDE 5 MG: 5 TABLET ORAL at 07:56

## 2021-12-01 RX ADMIN — OXYCODONE 10 MG: 5 TABLET ORAL at 13:59

## 2021-12-01 RX ADMIN — OXYCODONE 10 MG: 5 TABLET ORAL at 17:10

## 2021-12-01 RX ADMIN — MONTELUKAST 10 MG: 10 TABLET, FILM COATED ORAL at 09:00

## 2021-12-01 RX ADMIN — CETIRIZINE HYDROCHLORIDE 10 MG: 10 TABLET, FILM COATED ORAL at 09:00

## 2021-12-01 RX ADMIN — POLYETHYLENE GLYCOL 3350 17 G: 17 POWDER, FOR SOLUTION ORAL at 09:01

## 2021-12-01 RX ADMIN — OXYCODONE 10 MG: 5 TABLET ORAL at 23:10

## 2021-12-01 RX ADMIN — DOCUSATE SODIUM 50 MG AND SENNOSIDES 8.6 MG 1 TABLET: 8.6; 5 TABLET, FILM COATED ORAL at 17:10

## 2021-12-01 NOTE — PROGRESS NOTES
Spine Surgery Progress Note  Keron Ayers PA-C    Admit Date: 2021   LOS: 1 day      Daily Progress Note: 2021    POD:1 Day Post-Op    S/P: Procedure(s):  L5-S1 LUMBAR LAMINECTOMY WITH POSTERIOR LUMBAR FUSION (O ARM/S8)    Subjective:     Doing well on POD#1. Pain is well-controlled. Voiding and ambulating without issue. Denies numbness, tingling, chest pain, leg pain, nausea, vomiting, difficulty swallowing, headache, and dyspnea. Pt resting comfortably in bed. Objective:     Vital signs  Temp (24hrs), Av.8 °F (36.6 °C), Min:97.3 °F (36.3 °C), Max:98.5 °F (36.9 °C)   No intake/output data recorded.  1901 -  0700  In: 920 [I.V.:920]  Out: 125     Visit Vitals  BP (!) 141/89 (BP 1 Location: Right upper arm, BP Patient Position: Standing)   Pulse (!) 112   Temp 98.5 °F (36.9 °C)   Resp 18   Ht 6' (1.829 m)   Wt 107 kg (235 lb 14.3 oz)   SpO2 98%   BMI 31.99 kg/m²      O2 Device: None (Room air)     Output (mL)  Last Bowel Movement Date: 21 (21 045)  Unmeasurable Output  Urine Occurrence(s): 1 (21)     Pain control  Pain Assessment  Pain Scale 1: Numeric (0 - 10)  Pain Intensity 1: 8  Pain Onset 1: post opt  Pain Location 1: Back  Pain Orientation 1: Medial  Pain Description 1: Aching  Pain Intervention(s) 1: Medication (see MAR)    PT/OT  Gait     Gait  Base of Support: Widened  Speed/Shayna: Shuffled, Slow  Step Length: Right shortened, Left shortened  Gait Abnormalities: Antalgic, Shuffling gait  Ambulation - Level of Assistance: Contact guard assistance  Distance (ft): 300 Feet (ft)  Assistive Device: Brace/Splint, Cane, straight, Gait belt        Physical Exam:  Gen: No acute distress. Neuro: A&Ox3. Follows commands. Speech clear. Affect normal.  AUGUSTIN. Strength 5/5 in UE and LE BL. Sensation intact. Gait deferred. Calves soft and supple;  No pain with passive stretch  Skin: Incision C/D/I    24 hour results:    Recent Results (from the past 24 hour(s)) METABOLIC PANEL, BASIC    Collection Time: 12/01/21  4:50 AM   Result Value Ref Range    Sodium 135 (L) 136 - 145 mmol/L    Potassium 4.4 3.5 - 5.1 mmol/L    Chloride 108 97 - 108 mmol/L    CO2 17 (L) 21 - 32 mmol/L    Anion gap 10 5 - 15 mmol/L    Glucose 123 (H) 65 - 100 mg/dL    BUN 14 6 - 20 MG/DL    Creatinine 0.80 0.70 - 1.30 MG/DL    BUN/Creatinine ratio 18 12 - 20      GFR est AA >60 >60 ml/min/1.73m2    GFR est non-AA >60 >60 ml/min/1.73m2    Calcium 8.6 8.5 - 10.1 MG/DL   HEMOGLOBIN    Collection Time: 12/01/21  4:50 AM   Result Value Ref Range    HGB 13.5 12.1 - 17.0 g/dL        Assessment:     Active Problems:    Spondylolisthesis (11/30/2021)      Plan:     s/p L5-S1 lami/fusion  -PT/OT - in lumbar brace    -Pain control - scheduled tylenol, prn oxycodone    -Regular diet   -Daily dressing changes to incision   -Cont home meds    Readiness for discharge:     [] Vital Signs stable    [x] Hgb stable    [x] + Voiding    [x] Wound intact, drainage minimal    [x] Tolerating PO intake     [] Cleared by PT (OT if applicable)    [x] Adequate pain control on oral medication alone     Activity: up with assist  DVT ppx: SCDs  Dispo: likely home tomorrow if doing well    Plan d/w Dr. Meryle Otter, PA

## 2021-12-01 NOTE — OP NOTES
[address] [facility]   OPERATIVE REPORT    Name:  Javier Rogers  MR#:  [id number]  :  1970  ACCOUNT #:  [de-identified]  DATE OF SERVICE:  2021      PREOPERATIVE DIAGNOSES:  Bilateral pars defects grade 1 and has spondylolisthesis L5-S1, lumbar stenosis L5-S1. POSTOPERATIVE DIAGNOSES:  Bilateral pars defects grade 1 and has spondylolisthesis L5-S1, lumbar stenosis L5-S1. PROCEDURES PERFORMED:  Lumbar laminectomy with Duke fragment removal, posterior lumbar fusion L5-S1 with segmental instrumentation, transforaminal interbody fusion L5-S1. SURGEON:  Sheryle Dus, MD    ASSISTANT:  Seda Garcia PA-C    ANESTHESIA:  General anesthesia. COMPLICATIONS:  None. SPECIMENS REMOVED:  None    IMPLANTS:  Instrumentation includes Medtronic Solera pedicle screws and Medtronic Catalyft interbody graft. ESTIMATED BLOOD LOSS:  Minimal.    INDICATIONS:  This is a pleasant 13-year-old gentleman with chronic bilateral pars defects L5-S1 with progressive spondylolisthesis at L5-S1 with severe bilateral foraminal stenosis. He failed conservative treatment and understood the risks and benefits, elected to proceed. PROCEDURE:  The patient was identified, brought to the operative suite, underwent general anesthesia without difficulty. Preoperative neuromonitoring was placed, baselines obtained and these remained stable throughout the surgical procedure. Perioperative antibiotics were also given to the patient. He was placed in prone position on the open [facility] table with all bony prominences well padded. Back was prepped and draped sterilely. Time-out confirmed. A midline incision was then made directly over the L5-S1 interval.  Dissection was carried down. We exposed the Duke fragment and the bilateral pars defects. Transverse process at L5 identified as well as the sacral ala. The facet capsule was removed from the L5-S1 facet.   Then we placed a spinous process clamp on the inferior portion of the sacrum followed by 4-ball fiducial array and then a sterile drape over the surgical field. Medtronic O2 O-arm was brought in. We did intraoperative spin. Scan was performed and I was able to get a 3-D image of the lumbar sacral spine. Using this image and the S8 navigation system, we were able to drill  holes to 30 mm with the navigated Midas followed by tapping to 45 mm with a 6.5 x 45-mm Medtronic Solera screws at L5 which all tested out greater than 20 milliamps and then 7.5 x 45-mm at S1. Again all testing out very well. At that time, we started wide laminectomy with removal of the Duke fragment for decompression of lateral recess and the exiting L5 nerve roots. Bilateral decompression was confirmed with decompression of the facet joint and facetectomy and we followed out the nerve roots onto the far lateral region as well. Hemostasis with bipolar cautery. After satisfactory decompression, we then did bilateral annulotomies and did a complete evacuation of disk space as well as distraction of the disk space back to normal height with the shannon up to approximately 11 mm height. We then placed an OsteoAMP sponge as well as an extra small BMP in the anterior one-third column followed by bilateral Medtronic Catalyft expandable interbody grafts. These had been impacted in place and expanded to approximately 11 mm height. 7 degrees of lordosis was also restored. Neuromonitoring was stable. We irrigated the wound lightly with Irrisept irrigation. Final x-rays demonstrated good alignment of all the instrumentation hardwares. We then attached the pedicle screws with longitudinal rods with set screws and final tightening performed. We irrigated with Irrisept irrigation followed by pulse irrigation.   We decorticated the transverse process of L5, the remainder of the facet at L5-S1 as well as the lateral masses and then packed the remainder of the allograft, autograft mixture into the lateral gutters. Wounds thoroughly irrigated. At which time, we then used FloSeal for hemostasis, vancomycin powder 0.5 g into the deep wound followed by #1 Stratafix in the fascial layer, 2-0 Stratafix in the subcutaneous layer, vancomycin powder again and then ZipLine on the skin. The patient was returned to the PACU in stable condition after pain solution infiltrated into the muscle. The physician assistant was present during the entire operative procedure and assisted in all critical elements of the surgery. No surgical assist or resident was available.        John Bowers MD      [id number]  D:  11/30/2021 14:38  T:  11/30/2021 22:43  JOB #:  [id number]

## 2021-12-01 NOTE — PROGRESS NOTES
OCCUPATIONAL THERAPY EVALUATION/DISCHARGE  Patient: Gera Gordon (79 y.o. male)  Date: 12/1/2021  Primary Diagnosis: Spondylolisthesis [M43.10]  Procedure(s) (LRB):  L5-S1 LUMBAR LAMINECTOMY WITH POSTERIOR LUMBAR FUSION (O ARM/S8) (N/A) 1 Day Post-Op   Precautions:  Spinal    ASSESSMENT  Based on the objective data described below, the patient presents with decreased higher level mobility/balance and activity tolerance; however, he is demonstrating a high level of safe functional independence, completing SPC level ADLs with SBA, with pt noted with good functional awareness to spinal precautions. Pt reports DME/AE needs fulfilled within home, as well as, good PRN assist from wife. Given pt's current high level of safe functional independence, DME/AE needs fulfilled, good compliance with precautions, good in-home ADL/IADL assist available and physical therapy following mobility/balance deficits, no other acute OT needs identified, with OT answering pt's questions/concerns and patient thanking therapist for his efforts. Current Level of Function (ADLs/self-care): SBA    Functional Outcome Measure: The patient scored 55/100 on the Barthel Index outcome measure. Other factors to consider for discharge: none     PLAN :  Recommendation for discharge: (in order for the patient to meet his/her long term goals)  No skilled occupational therapy/ follow up rehabilitation needs identified at this time. This discharge recommendation:  Has been made in collaboration with the attending provider and/or case management    IF patient discharges home will need the following DME: patient owns DME required for discharge       SUBJECTIVE:   Patient stated Thanks for all your help, I feel good about everything.     OBJECTIVE DATA SUMMARY:   HISTORY:   Past Medical History:   Diagnosis Date    Adverse effect of anesthesia     SLOW TO AROUSE     Allergic rhinitis 4/8/2012    Asthma 4/8/2012    Balance disorder     Cancer ([facility] [address]     skin ca exision from scalp w/ subsequent  local numbness [facility] FOREHEAD    GERD (gastroesophageal reflux disease)     h/o gerd    HX OTHER MEDICAL     rt shoulder posterior instability     Hypertension     Intractable chronic migraine without aura 2/9/2019    Dr.Kim Charles    Migraine 4/8/2012    Post concussion syndrome 2/9/2019    Psychiatric disorder     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 dislocated jaw . eval'd by ENT . Dr. Bello Shown and Dentist     Tremor, coarse 2/9/2019    Vertigo      Past Surgical History:   Procedure Laterality Date    HX BACK SURGERY  2020    Cervical C5- C6 DISC surgery     HX CHOLECYSTECTOMY      HX HEENT  march 2015    Septoplasty, sinoplasty, turbinate reduction , Dr. Vernel Libman [address]      inguinal     HX ORTHOPAEDIC  12/2018    Right Rotator Cuff Repair. Faith Segovia MD CARTILADGE, BONE SPUR     HX OTHER SURGICAL      skin ca excision from face.  HX VASECTOMY      HX WISDOM TEETH EXTRACTION      X4 AT 22 YRS OLD        Prior Level of Function/Environment/Context:  Mod Indep at [facility]  Expanded or extensive additional review of patient history:     Home Situation  Home Environment: Private residence  # Steps to Enter: 7  Rails to Enter: Yes  Hand Rails : Bilateral  One/Two Story Residence: Two story  # of Interior Steps: 13 (with landing)  [employer]: Right  Support Systems: Spouse/Significant Other, Child(julita)  Patient Expects to be Discharged to[de-identified] [facility]  Current DME Used/Available at Home: Cane, straight, Raised toilet seat  Tub or Shower Type: Shower    Hand dominance: Right    EXAMINATION OF PERFORMANCE DEFICITS:  Cognitive/Behavioral Status:  Neurologic State: Alert  Orientation Level: Oriented X4  Cognition: Appropriate decision making; Appropriate for age attention/concentration; Appropriate safety awareness  Perception: Appears intact  Perseveration: No perseveration noted  Safety/Judgement: Awareness of environment; Good awareness of safety precautions; Home safety; Insight into deficits    Hearing: Auditory  Auditory Impairment: None    Vision/Perceptual:                                Corrective Lenses: Reading glasses    Range of Motion:  AROM: Generally decreased, functional  PROM: Within functional limits                      Strength:  Strength: Generally decreased, functional                Coordination:  Coordination: Generally decreased, functional  Fine Motor Skills-Upper: Left Intact; Right Intact    Gross Motor Skills-Upper: Left Intact; Right Intact    Tone & Sensation:  Tone: Normal  Sensation: Intact                      Balance:  Sitting: Intact  Standing: Impaired; With support  Standing - Static: Good  Standing - Dynamic : Good; Fair; Constant support    Functional Mobility and Transfers for ADLs:  Bed Mobility:  Rolling: Supervision  Supine to Sit: Supervision    Transfers:  Sit to Stand: Stand-by assistance  Stand to Sit: Stand-by assistance  Bed to Chair: Stand-by assistance    ADL Assessment:  Feeding: Independent    Oral Facial Hygiene/Grooming: Stand-by assistance    Bathing: Supervision    Upper Body Dressing: Independent    Lower Body Dressing: Supervision    Toileting: Stand by assistance    ADL Intervention and task modifications:  Patient instructed and indicated understanding the benefits of maintaining activity tolerance, functional mobility, and independence with self care tasks during acute stay  to ensure safe return home and to baseline. Encouraged patient to increase frequency and duration OOB, be out of bed for all meals, perform daily ADLs (as approved by RN/MD regarding bathing etc), and performing functional mobility to/from bathroom.     Pt educated on safe transfer techniques, with specific emphasis on proper hand placement to push up from seated surface rather than attempt to pull self up, fully positioning self in-front of desired seated location, feeling chair on back of legs and reaching back with 1-2 UE to slowly lower self to seated position. Cognitive Retraining  Safety/Judgement: Awareness of environment; Good awareness of safety precautions; Home safety; Insight into deficits    Patient instructed and demonstrated 3/3 spine precautions with single verbal cue. Patient instructed and indicated understanding the benefits of maintaining activity tolerance, functional mobility, and independence with self care tasks during acute stay  to ensure safe return home and to baseline. Encouraged patient to increase frequency and duration OOB, not sitting longer than 30 mins without marching/walking with staff, be out of bed for all meals, perform daily ADLs (as approved by RN/MD regarding bathing etc), and performing functional mobility to/from bathroom. Patient instruction and indicated understanding on body mechanics, ergonomics and gravitational force on the spine during different body positions to plan activities in prep for return home to complete basic ADLs, instrumental ADLs and back to work safely. Patient instructed and demonstrated while supine hip ER stretch and hold 10 seconds to increase ROM in prep for lower body ADLs daily with Supervision. Bathing: Patient instructed and indicated understanding when bathing to not submerge wound in water, stand to shower or sponge bathe, cover wound with plastic and tape to ensure no water reaches bandage/wound without cues. Dressing brace: Patient instructed and demonstrated to don/doff velcro on brace using dominant side, keeping non-dominant side intact.  Patient instructed and demonstrated in meantime of being able to stand with back against wall to don/doff brace, to don/doff seated using lap and bed/chair surface to support brace while manipulating. Dressing lower body: Patient instructed to don brace first and on the benefits to remain seated to don all clothing to increase independence with precautions and pain management. Patient instructed and demonstrated tailor sitting for lower body dressing with SBA. Toileting: Patient instructed on the benefits of using flushable wet wipes and toilet tongs if decreased reach or pain for lilia care. Also, the benefits of a reacher to aid in clothing management. Patient instruction and indicated understanding to not strain i.e. holding breath to bear down during a bowel movement, lifting/activity, and sexual activity. Home safety: Patient instructed and indicated understanding on home modifications and safety [raise height of ADL objects (i.e. clothing, sink items, fridge items, items to mouth when grooming), appropriate height of chair surfaces, recliner safety, change of floor surfaces, clear pathways] to increase independence and fall prevention. Standing: Patient instructed and indicated understanding to walk up to sink/counter top/surfaces, step into walker, square off while using objects, slide objects along surfaces, to increase adherence to back precautions and fall prevention. Patient instructed to increase amount of time standing in order to increase independence and tolerance with ADLs. During prolonged standing, can open cabinet door or place foot on stool to decrease spinal pressure/increase pain. Tub transfer: Patient instructed and indicated understanding regarding when it is safe to begin transfer into tub (complete stairs with PT, advance exercises with PT high enough to clear tub height, and while clothes donned practice with another person present).       Functional Measure:    Barthel Index:  Bathin  Bladder: 10  Bowels: 10  Groomin  Dressin  Feeding: 10  Mobility: 0  Stairs: 0  Toilet Use: 5  Transfer (Bed to Chair and Back): 10  Total: 55/100      The Barthel ADL Index: Guidelines  1. The index should be used as a record of what a patient does, not as a record of what a patient could do. 2. The main aim is to establish degree of independence from any help, physical or verbal, however minor and for whatever reason. 3. The need for supervision renders the patient not independent. 4. A patient's performance should be established using the best available evidence. Asking the patient, friends/relatives and nurses are the usual sources, but direct observation and common sense are also important. However direct testing is not needed. 5. Usually the patient's performance over the preceding 24-48 hours is important, but occasionally longer periods will be relevant. 6. Middle categories imply that the patient supplies over 50 per cent of the effort. 7. Use of aids to be independent is allowed. Score Interpretation (from [address] [address])    Independent   60-79 Minimally independent   40-59 Partially dependent   20-39 Very dependent   <20 Totally dependent     -Courtney Julien., Barthel, D.W. (1965). Functional evaluation: the Barthel Index. [facility] ([address] (1997). The Barthel activities of daily living index: self-reporting versus actual performance in the old (> or = 75 years). Journal of [address] [place] [address] 457), [facility], ..., [facility]., Della Saul. (1999). Measuring the change in disability after inpatient rehabilitation; comparison of the responsiveness of the Barthel Index and Functional Joice Measure. Journal of Neurology, Neurosurgery, and Psychiatry, 66(4), 010-353. Nora Palacios N.J.MARISOL, ISABELA Dunham, & George Chen MBuddyA. (2004) Assessment of post-stroke quality of life in cost-effectiveness studies: The usefulness of the Barthel Index and the EuroQoL-5D.  Quality of Life Research, 15, 887-18       Occupational Therapy Evaluation Charge Determination   History Examination Decision-Making   LOW Complexity : Brief history review  LOW Complexity : 1-3 performance deficits relating to physical, cognitive , or psychosocial skils that result in activity limitations and / or participation restrictions  LOW Complexity : No comorbidities that affect functional and no verbal or physical assistance needed to complete eval tasks       Based on the above components, the patient evaluation is determined to be of the following complexity level: LOW   Pain Rating:  No c/o pain    Activity Tolerance:   Good, Fair and requires rest breaks    After treatment patient left in no apparent distress:    Sitting in chair, Call bell within reach and PT present    COMMUNICATION/EDUCATION:   The patients plan of care was discussed with: Physical therapist, Registered nurse and Case management.      Thank you for this referral.  Jacqueline Singh OT  Time Calculation: 30 mins

## 2021-12-01 NOTE — PROGRESS NOTES
Problem: Falls - Risk of  Goal: *Absence of Falls  Description: Document Gera Carroll Fall Risk and appropriate interventions in the flowsheet.   Outcome: Progressing Towards Goal  Note: Fall Risk Interventions:  Mobility Interventions: PT Consult for assist device competence, PT Consult for mobility concerns, Patient to call before getting OOB, OT consult for ADLs         Medication Interventions: Teach patient to arise slowly, Patient to call before getting OOB, Evaluate medications/consider consulting pharmacy

## 2021-12-01 NOTE — PROGRESS NOTES
Transition of Care Plan   RUR- Low 4%    DISPOSITION: The disposition plan is home with family assistance; pending medical progression   F/U with PCP/Specialist     Transport: Family     Reason for Admission:  Spondylolisthesis                Plan for utilizing home health:      PT recommending [place]  Per conversation with the pt he reported that he feels that he does not need HH. PCP: First and Last name:  Deanne Noriega MD     Name of Practice:    Are you a current patient: Yes/No: Yes    Approximate date of last visit: 11/24   Can you participate in a virtual visit with your PCP:                     Current Advanced Directive/Advance Care Plan: Full Code      Healthcare Decision Maker:   Click here to complete [address] including selection of the Healthcare Decision Maker Relationship (ie \"Primary\")                             Transition of Care Plan: This cm met the pt at bedside to conduct the initial evaluation. The pt presented as aox4. The pt confirmed his demographics. 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 The pt's preferred pharmacy is  [facility]. The pt reported that he is independent with ADls and IADLs at baseline. The pt reported that that he utilizes a cane at baseline. The pt reported that he feels that he will not require HH at discharge as he is ambulating in the room. This cm will continue to follow for VJ needs. Care Management Interventions  PCP Verified by CM: Yes  Mode of Transport at Discharge:  Other (see comment) (Family)  Transition of Care Consult (CM Consult): Discharge Planning, [address]: No  Reason Outside [facility]: Unable to staff case  Physical Therapy Consult: Yes  Occupational Therapy Consult: Yes  Speech Therapy Consult: No  Support Systems: Spouse/Significant Other, Other Family Member(s)  Confirm Follow Up Transport: Family  Discharge Location  Discharge Placement: Home  CM: [address]. MSW,   [phone]

## 2021-12-01 NOTE — PROGRESS NOTES
Problem: Mobility Impaired (Adult and Pediatric)  Goal: *Acute Goals and Plan of Care (Insert Text)  Description: FUNCTIONAL STATUS PRIOR TO ADMISSION: Patient was modified independent using a single point cane for functional mobility, admits that he was furniture walking and recently had upgraded to [facility]. Pt lives in a two level home with family. HOME SUPPORT PRIOR TO ADMISSION: The patient lived with wife but did not require assist.    Physical Therapy Goals  Initiated 12/1/2021  1. Patient will move from supine to sit and sit to supine , scoot up and down, and roll side to side in bed with modified independence within 7 day(s). 2.  Patient will transfer from bed to chair and chair to bed with modified independence using the least restrictive device within 7 day(s). 3.  Patient will perform sit to stand with modified independence within 7 day(s). 4.  Patient will ambulate with modified independence for 300 feet with the least restrictive device within 7 day(s). 5.  Patient will ascend/descend 14 stairs with 1 handrail(s) with supervision/set-up within 7 day(s). 12/1/2021 1556 by Elvis Sanders, PT  Outcome: Progressing Towards Goal  12/1/2021 1133 by Elvis Sanders PT  Outcome: Progressing Towards Goal     PHYSICAL THERAPY TREATMENT  Patient: Zack Celeste (37 y.o. male)  Date: 12/1/2021  Diagnosis: Spondylolisthesis [M43.10]   <principal problem not specified>  Procedure(s) (LRB):  L5-S1 LUMBAR LAMINECTOMY WITH POSTERIOR LUMBAR FUSION (O ARM/S8) (N/A) 1 Day Post-Op  Precautions: Spinal  Chart, physical therapy assessment, plan of care and goals were reviewed. ASSESSMENT  Patient continues with skilled PT services and is progressing towards goals. Pt tolerates sit to stand with good attention to safety. Pt able to mobilize into the hallway for approx 300ft with SPC and able to navigate the steps x14 with rail R and cane L. Increased cues and education required for sequencing.  Will continue to follow. Pt requires repeated instruction to maintain BLTs during not-intuitively flexing tasks. Current Level of Function Impacting Discharge (mobility/balance): CGA-SBA    Other factors to consider for discharge:          PLAN :  Patient continues to benefit from skilled intervention to address the above impairments. Continue treatment per established plan of care. to address goals. Recommendation for discharge: (in order for the patient to meet his/her long term goals)  Physical therapy at least 2 days/week in the home     This discharge recommendation:  Has been made in collaboration with the attending provider and/or case management    IF patient discharges home will need the following DME: to be determined (TBD)       SUBJECTIVE:   Patient stated I am doing enough I think.     OBJECTIVE DATA SUMMARY:   Critical Behavior:  Neurologic State: Alert  Orientation Level: Oriented X4  Cognition: Appropriate decision making, Appropriate for age attention/concentration, Appropriate safety awareness  Safety/Judgement: Awareness of environment, Good awareness of safety precautions, Home safety, Insight into deficits  Functional Mobility Training:  Bed Mobility:  Rolling: Supervision  Supine to Sit: Supervision  Sit to Supine: Supervision; Adaptive equipment; Additional time           Transfers:  Sit to Stand: Stand-by assistance  Stand to Sit: Stand-by assistance        Bed to Chair: Stand-by assistance                    Balance:  Sitting: Intact  Standing: Impaired; With support  Standing - Static: Good; Constant support  Standing - Dynamic : Good; Constant support  Ambulation/Gait Training:  Distance (ft): 300 Feet (ft)  Assistive Device: Brace/Splint; Cane, straight; Gait belt  Ambulation - Level of Assistance: Contact guard assistance     Gait Description (WDL): Exceptions to WDL  Gait Abnormalities: Antalgic; Shuffling gait        Base of Support: Widened     Speed/Shayna: Shuffled;  Slow  Step Length: Left shortened; Right shortened         Stairs:  Number of Stairs Trained: 14  Stairs - Level of Assistance: Contact guard assistance   Rail Use: Right     Pain Rating:  controlled    Activity Tolerance:   Good, Fair, and requires rest breaks    After treatment patient left in no apparent distress:   Supine in bed and Call bell within reach    COMMUNICATION/COLLABORATION:   The patients plan of care was discussed with: Registered nurse and Case management.      Taye Hahn, PT   Time Calculation: 25 mins

## 2021-12-01 NOTE — PROGRESS NOTES
Problem: Mobility Impaired (Adult and Pediatric)  Goal: *Acute Goals and Plan of Care (Insert Text)  Description: FUNCTIONAL STATUS PRIOR TO ADMISSION: Patient was modified independent using a single point cane for functional mobility, admits that he was furniture walking and recently had upgraded to [facility]. Pt lives in a two level home with family. HOME SUPPORT PRIOR TO ADMISSION: The patient lived with wife but did not require assist.    Physical Therapy Goals  Initiated 12/1/2021  1. Patient will move from supine to sit and sit to supine , scoot up and down, and roll side to side in bed with modified independence within 7 day(s). 2.  Patient will transfer from bed to chair and chair to bed with modified independence using the least restrictive device within 7 day(s). 3.  Patient will perform sit to stand with modified independence within 7 day(s). 4.  Patient will ambulate with modified independence for 300 feet with the least restrictive device within 7 day(s). 5.  Patient will ascend/descend 14 stairs with 1 handrail(s) with supervision/set-up within 7 day(s). Outcome: Progressing Towards Goal     PHYSICAL THERAPY EVALUATION  Patient: Pablo Guido (33 y.o. male)  Date: 12/1/2021  Primary Diagnosis: Spondylolisthesis [M43.10]  Procedure(s) (LRB):  L5-S1 LUMBAR LAMINECTOMY WITH POSTERIOR LUMBAR FUSION (O ARM/S8) (N/A) 1 Day Post-Op   Precautions:   Spinal      ASSESSMENT  Based on the objective data described below, the patient presents with impaired trunk ROM, spinal precautions, B LE pain, balance, weakness and gait dysfunction/intolerance causing limited independence with mobility s/p recent L5-S1 POD #1. Pt PLOF: indepenent with ADLs and IADLs. Patient lives with wife in 2 story home, 7 steps to enter, R rails.    Pt received in edge of bed with OT, assist to adjust brace with increased focus on positioning, ambulates well with SPC with imbalance at times with baseline balance/coordination issues reported per pt. Pt tolerates well overall. Reviewed back precautions, sitting limit of 30-45 minutes, positioning in chair, and progress. Pt is not cleared for discharge from Physical Therapy standpoint at this time, recommend HHPT, will continue to evaluate as patient does not want therapy. Will benefit from therapy in acute setting, anticipate will improve tolerance quickly with improved pain control. Current Level of Function Impacting Discharge (mobility/balance): Functional Outcome Measure: The patient scored Total: 55/100 on the Barthel Index outcome measure which is indicative of being moderate dependent in basic self-care. Other factors to consider for discharge:      Patient will benefit from skilled therapy intervention to address the above noted impairments. PLAN :  Recommendations and Planned Interventions: bed mobility training, transfer training, gait training, therapeutic exercises, neuromuscular re-education, patient and family training/education and therapeutic activities      Frequency/Duration: Patient will be followed by physical therapy:  twice daily to address goals. Recommendation for discharge: (in order for the patient to meet his/her long term goals)  Physical therapy at least 2 days/week in the home     This discharge recommendation:  Has been made in collaboration with the attending provider and/or case management    IF patient discharges home will need the following DME: to be determined (TBD)         SUBJECTIVE:   Patient stated I am hoping I can do well.     OBJECTIVE DATA SUMMARY:   HISTORY:    Past Medical History:   Diagnosis Date    Adverse effect of anesthesia     SLOW TO AROUSE     Allergic rhinitis 4/8/2012    Asthma 4/8/2012    Balance disorder     Cancer ([facility] [address]     skin ca exision from scalp w/ subsequent  local numbness [facility] FOREHEAD    GERD (gastroesophageal reflux disease)     h/o gerd    HX OTHER MEDICAL     rt shoulder posterior instability  Hypertension     Intractable chronic migraine without aura 2/9/2019    Dr.Kim Charles    Migraine 4/8/2012    Post concussion syndrome 2/9/2019    Psychiatric disorder     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 dislocated jaw . eval'd by ENT . Dr. Zuleyma Walker and Dentist     Tremor, coarse 2/9/2019    Vertigo      Past Surgical History:   Procedure Laterality Date    HX BACK SURGERY  2020    Cervical C5- C6 DISC surgery     HX CHOLECYSTECTOMY      HX HEENT  march 2015    Septoplasty, sinoplasty, turbinate reduction , Dr. Eduardo Person [address]      inguinal     HX ORTHOPAEDIC  12/2018    Right Rotator Cuff Repair. Chelsy Griffin MD CARTILADGE, BONE SPUR     HX OTHER SURGICAL      skin ca excision from face.  HX VASECTOMY      HX WISDOM TEETH EXTRACTION      X4 AT 22 YRS OLD        Personal factors and/or comorbidities impacting plan of care:     Home Situation  Home Environment: Private residence  # Steps to Enter: 7  Rails to Enter: Yes  Hand Rails : Bilateral  One/Two Story Residence: Two story  # of Interior Steps: 15 (with landing)  [employer]: Right  Support Systems: Spouse/Significant Other, Child(julita)  Patient Expects to be Discharged to[de-identified] [employer]  Current DME Used/Available at Home: Cane, straight, Raised toilet seat  Tub or Shower Type: Shower    EXAMINATION/PRESENTATION/DECISION MAKING:   Critical Behavior:  Neurologic State: Alert  Orientation Level: Oriented X4  Cognition: Appropriate decision making, Appropriate for age attention/concentration, Appropriate safety awareness  Safety/Judgement: Awareness of environment, Good awareness of safety precautions, Home safety, Insight into deficits  Hearing:   Auditory  Auditory Impairment: None  Skin:  intact  Edema: none  Range Of Motion:  AROM: Generally decreased, functional           PROM: Within functional limits           Strength:    Strength: Generally decreased, functional                    Tone & Sensation:   Tone: Normal              Sensation: Intact               Coordination:  Coordination: Generally decreased, functional  Vision:   Corrective Lenses: Reading glasses  Functional Mobility:  Bed Mobility:  Rolling: Supervision  Supine to Sit: Supervision        Transfers:  Sit to Stand: Stand-by assistance  Stand to Sit: Stand-by assistance        Bed to Chair: Stand-by assistance              Balance:   Sitting: Intact  Standing: Impaired; With support  Standing - Static: Good  Standing - Dynamic : Good; Fair; Constant support  Ambulation/Gait Training:  Distance (ft): 300 Feet (ft)  Assistive Device: Brace/Splint; Cane, straight; Gait belt  Ambulation - Level of Assistance: Contact guard assistance     Gait Description (WDL): Exceptions to WDL  Gait Abnormalities: Antalgic; Shuffling gait        Base of Support: Widened     Speed/Shayna: Shuffled; Slow  Step Length: Right shortened; Left shortened     Functional Measure:  Barthel Index:    Bathin  Bladder: 10  Bowels: 10  Groomin  Dressin  Feeding: 10  Mobility: 0  Stairs: 0  Toilet Use: 5  Transfer (Bed to Chair and Back): 10  Total: 55/100       The Barthel ADL Index: Guidelines  1. The index should be used as a record of what a patient does, not as a record of what a patient could do. 2. The main aim is to establish degree of independence from any help, physical or verbal, however minor and for whatever reason. 3. The need for supervision renders the patient not independent. 4. A patient's performance should be established using the best available evidence. Asking the patient, friends/relatives and nurses are the usual sources, but direct observation and common sense are also important. However direct testing is not needed.   5. Usually the patient's performance over the preceding 24-48 hours is important, but occasionally longer periods will be relevant. 6. Middle categories imply that the patient supplies over 50 per cent of the effort. 7. Use of aids to be independent is allowed. Score Interpretation (from [facility])    Independent   60-79 Minimally independent   40-59 Partially dependent   20-39 Very dependent   <20 Totally dependent     -Courtney Julien., BarthelCHUCK. (1965). Functional evaluation: the Barthel Index. [address] ([address] (1997). The Barthel activities of daily living index: self-reporting versus actual performance in the old (> or = 75 years). Journal of [address] [place] [address] 45(7), [facility], JANA, Radha Gaming., [facility]. (1999). Measuring the change in disability after inpatient rehabilitation; comparison of the responsiveness of the Barthel Index and Functional [place] Measure. Journal of Neurology, Neurosurgery, and Psychiatry, 66(4), 919-765. Debbie Aranda, N.J.A, ISABELA Dunham, & Maurice Diaz M.A. (2004) Assessment of post-stroke quality of life in cost-effectiveness studies: The usefulness of the Barthel Index and the EuroQoL-5D.  Quality of Life Research, 15, 313-26        Physical Therapy Evaluation Charge Determination   History Examination Presentation Decision-Making   HIGH Complexity :3+ comorbidities / personal factors will impact the outcome/ POC  HIGH Complexity : 4+ Standardized tests and measures addressing body structure, function, activity limitation and / or participation in recreation  LOW Complexity : Stable, uncomplicated  Other outcome measures barthel  MEDIUM      Based on the above components, the patient evaluation is determined to be of the following complexity level: LOW     Pain Rating:  Controlled but significant    Activity Tolerance:   Good, Fair and requires rest breaks    After treatment patient left in no apparent distress:   Sitting in chair, Call bell within reach and Caregiver / family present    COMMUNICATION/EDUCATION:   The patients plan of care was discussed with: Registered nurse and Case management. Fall prevention education was provided and the patient/caregiver indicated understanding. and Patient/family have participated as able in goal setting and plan of care.     Thank you for this referral.  Robyn Hahn, PT   Time Calculation: 30 mins

## 2021-12-02 VITALS
TEMPERATURE: 98.3 F | OXYGEN SATURATION: 96 % | WEIGHT: 235.89 LBS | BODY MASS INDEX: 31.95 KG/M2 | HEIGHT: 72 IN | HEART RATE: 80 BPM | SYSTOLIC BLOOD PRESSURE: 127 MMHG | DIASTOLIC BLOOD PRESSURE: 82 MMHG | RESPIRATION RATE: 18 BRPM

## 2021-12-02 LAB — HGB BLD-MCNC: 12.4 G/DL (ref 12.1–17)

## 2021-12-02 PROCEDURE — 97116 GAIT TRAINING THERAPY: CPT

## 2021-12-02 PROCEDURE — 74011250637 HC RX REV CODE- 250/637: Performed by: ORTHOPAEDIC SURGERY

## 2021-12-02 PROCEDURE — 0SG30AJ FUSION OF LUMBOSACRAL JOINT WITH INTERBODY FUSION DEVICE, POSTERIOR APPROACH, ANTERIOR COLUMN, OPEN APPROACH: ICD-10-PCS | Performed by: ORTHOPAEDIC SURGERY

## 2021-12-02 PROCEDURE — 0SG30K1 FUSION OF LUMBOSACRAL JOINT WITH NONAUTOLOGOUS TISSUE SUBSTITUTE, POSTERIOR APPROACH, POSTERIOR COLUMN, OPEN APPROACH: ICD-10-PCS | Performed by: ORTHOPAEDIC SURGERY

## 2021-12-02 PROCEDURE — 74011250637 HC RX REV CODE- 250/637: Performed by: PHYSICIAN ASSISTANT

## 2021-12-02 PROCEDURE — 01NB0ZZ RELEASE LUMBAR NERVE, OPEN APPROACH: ICD-10-PCS | Performed by: ORTHOPAEDIC SURGERY

## 2021-12-02 PROCEDURE — 36415 COLL VENOUS BLD VENIPUNCTURE: CPT

## 2021-12-02 PROCEDURE — 85018 HEMOGLOBIN: CPT

## 2021-12-02 RX ORDER — AMOXICILLIN 250 MG
1 CAPSULE ORAL 2 TIMES DAILY
Qty: 30 TABLET | Refills: 0 | Status: SHIPPED | OUTPATIENT
Start: 2021-12-02 | End: 2022-03-01 | Stop reason: CLARIF

## 2021-12-02 RX ORDER — OXYCODONE HYDROCHLORIDE 5 MG/1
5 TABLET ORAL
Qty: 50 TABLET | Refills: 0 | Status: SHIPPED | OUTPATIENT
Start: 2021-12-02 | End: 2021-12-15 | Stop reason: ALTCHOICE

## 2021-12-02 RX ADMIN — AMLODIPINE BESYLATE 5 MG: 5 TABLET ORAL at 10:16

## 2021-12-02 RX ADMIN — MEMANTINE 5 MG: 5 TABLET ORAL at 10:18

## 2021-12-02 RX ADMIN — DOCUSATE SODIUM 50 MG AND SENNOSIDES 8.6 MG 1 TABLET: 8.6; 5 TABLET, FILM COATED ORAL at 10:19

## 2021-12-02 RX ADMIN — OXYCODONE 10 MG: 5 TABLET ORAL at 06:10

## 2021-12-02 RX ADMIN — OXYCODONE 10 MG: 5 TABLET ORAL at 13:14

## 2021-12-02 RX ADMIN — MONTELUKAST 10 MG: 10 TABLET, FILM COATED ORAL at 10:18

## 2021-12-02 RX ADMIN — VENLAFAXINE HYDROCHLORIDE 150 MG: 150 CAPSULE, EXTENDED RELEASE ORAL at 08:00

## 2021-12-02 RX ADMIN — ACETAMINOPHEN 1000 MG: 500 TABLET ORAL at 11:17

## 2021-12-02 RX ADMIN — LISINOPRIL 20 MG: 20 TABLET ORAL at 10:17

## 2021-12-02 RX ADMIN — FLUTICASONE PROPIONATE 1 SPRAY: 50 SPRAY, METERED NASAL at 09:58

## 2021-12-02 RX ADMIN — ACETAMINOPHEN 1000 MG: 500 TABLET ORAL at 06:10

## 2021-12-02 RX ADMIN — OXYCODONE 10 MG: 5 TABLET ORAL at 03:23

## 2021-12-02 RX ADMIN — OXYCODONE 10 MG: 5 TABLET ORAL at 09:58

## 2021-12-02 RX ADMIN — CETIRIZINE HYDROCHLORIDE 10 MG: 10 TABLET, FILM COATED ORAL at 10:17

## 2021-12-02 RX ADMIN — Medication 10 ML: at 06:11

## 2021-12-02 RX ADMIN — POLYETHYLENE GLYCOL 3350 17 G: 17 POWDER, FOR SOLUTION ORAL at 10:19

## 2021-12-02 NOTE — PROGRESS NOTES
Spine Surgery Progress Note  Danford Bence, PA-C    Admit Date: 2021   LOS: 2 days      Daily Progress Note: 2021    POD:2 Day Post-Op    S/P: Procedure(s):  L5-S1 LUMBAR LAMINECTOMY WITH POSTERIOR LUMBAR FUSION (O ARM/S8)    Subjective:     No acute events overnight. Some increased pain overnight but that is somewhat improved this morning. Doing well overall on POD#2. Pain is well-controlled. Voiding and ambulating without issue. Denies numbness, tingling, chest pain, leg pain, nausea, vomiting, difficulty swallowing, headache, and dyspnea. Pt resting comfortably in bed. Objective:     Vital signs  Temp (24hrs), Av.3 °F (36.8 °C), Min:98 °F (36.7 °C), Max:98.5 °F (36.9 °C)   No intake/output data recorded. No intake/output data recorded. Visit Vitals  /82 (BP 1 Location: Right upper arm, BP Patient Position: At rest;Lying)   Pulse 80   Temp 98.3 °F (36.8 °C)   Resp 18   Ht 6' (1.829 m)   Wt 107 kg (235 lb 14.3 oz)   SpO2 96%   BMI 31.99 kg/m²      O2 Device: None (Room air)     Output (mL)  Last Bowel Movement Date: 21 (21)  Unmeasurable Output  Urine Occurrence(s): 1 (21 5121)     Pain control  Pain Assessment  Pain Scale 1: Numeric (0 - 10)  Pain Intensity 1: 7  Pain Onset 1: post opt  Pain Location 1: Back  Pain Orientation 1: Lower  Pain Description 1: Aching  Pain Intervention(s) 1: Medication (see MAR)    PT/OT  Gait     Gait  Base of Support: Widened  Speed/Shayna: Shuffled, Slow  Step Length: Left shortened, Right shortened  Gait Abnormalities: Antalgic, Shuffling gait  Ambulation - Level of Assistance: Contact guard assistance  Distance (ft): 300 Feet (ft)  Assistive Device: Brace/Splint, Cane, straight, Gait belt  Rail Use: Right   Stairs - Level of Assistance: Contact guard assistance  Number of Stairs Trained: 14        Physical Exam:  Gen: No acute distress. Neuro: A&Ox3. Follows commands. Speech clear. Affect normal.  AUGUSTIN.  Strength 5/5 in UE and LE BL. Sensation intact. Gait deferred. Calves soft and supple;  No pain with passive stretch  Skin: Incision C/D/I    24 hour results:    Recent Results (from the past 24 hour(s))   HEMOGLOBIN    Collection Time: 12/02/21  4:06 AM   Result Value Ref Range    HGB 12.4 12.1 - 17.0 g/dL        Assessment:     Active Problems:    Spondylolisthesis (11/30/2021)      Plan:     s/p L5-S1 lami/fusion  -PT/OT - in lumbar brace    -Pain control - scheduled tylenol, prn oxycodone    -Regular diet   -Daily dressing changes to incision   -Cont home meds    Readiness for discharge:     [x] Vital Signs stable    [x] Hgb stable    [x] + Voiding    [x] Wound intact, drainage minimal    [x] Tolerating PO intake     [x] Cleared by PT (OT if applicable)    [x] Adequate pain control on oral medication alone     Activity: up with assist  DVT ppx: SCDs  Dispo: home today; follow up in 2 weeks    Plan d/w Dr. Bibiana Oscar, PA

## 2021-12-02 NOTE — PROGRESS NOTES
Problem: Falls - Risk of  Goal: *Absence of Falls  Description: Document Dania Blood Fall Risk and appropriate interventions in the flowsheet.   Outcome: Progressing Towards Goal  Note: Fall Risk Interventions:  Mobility Interventions: OT consult for ADLs, Patient to call before getting OOB, PT Consult for mobility concerns, PT Consult for assist device competence         Medication Interventions: Patient to call before getting OOB, Teach patient to arise slowly

## 2021-12-02 NOTE — PROGRESS NOTES
Patient's discharge instructions were reviewed, signed, copy given. All questions answered at this time.

## 2021-12-02 NOTE — PROGRESS NOTES
Problem: Mobility Impaired (Adult and Pediatric)  Goal: *Acute Goals and Plan of Care (Insert Text)  Description: FUNCTIONAL STATUS PRIOR TO ADMISSION: Patient was modified independent using a single point cane for functional mobility, admits that he was furniture walking and recently had upgraded to [facility]. Pt lives in a two level home with family. HOME SUPPORT PRIOR TO ADMISSION: The patient lived with wife but did not require assist.    Physical Therapy Goals  Initiated 12/1/2021  1. Patient will move from supine to sit and sit to supine , scoot up and down, and roll side to side in bed with modified independence within 7 day(s). 2.  Patient will transfer from bed to chair and chair to bed with modified independence using the least restrictive device within 7 day(s). 3.  Patient will perform sit to stand with modified independence within 7 day(s). 4.  Patient will ambulate with modified independence for 300 feet with the least restrictive device within 7 day(s). 5.  Patient will ascend/descend 14 stairs with 1 handrail(s) with supervision/set-up within 7 day(s). Outcome: Progressing Towards Goal    PHYSICAL THERAPY TREATMENT  Patient: Xochitl Venegas (13 y.o. male)  Date: 12/2/2021  Diagnosis: Spondylolisthesis [M43.10]   <principal problem not specified>  Procedure(s) (LRB):  L5-S1 LUMBAR LAMINECTOMY WITH POSTERIOR LUMBAR FUSION (O ARM/S8) (N/A) 2 Days Post-Op  Precautions: Spinal  Chart, physical therapy assessment, plan of care and goals were reviewed. ASSESSMENT  Patient continues with skilled PT services and is progressing towards goals. Pt tolerates standing from supine with good performance on log rolling, donning brace and increased stability overall. Pt demos good walking stability though still with decreased mitch for 300ft with good can management. Pt returned to chair and discussed progress, expectations, sitting and posture hygiene, task modification and answered questions.  Pt is cleared to DC from PT standpoint    Current Level of Function Impacting Discharge (mobility/balance): mod I    Other factors to consider for discharge:          PLAN :  Patient continues to benefit from skilled intervention to address the above impairments. Continue treatment per established plan of care. to address goals. Recommendation for discharge: (in order for the patient to meet his/her long term goals)  No skilled physical therapy/ follow up rehabilitation needs identified at this time. Pt does not want HH eval, appears stable. Encouraged pt to request outpatient if balance decreases     This discharge recommendation:  Has been made in collaboration with the attending provider and/or case management    IF patient discharges home will need the following DME: patient owns DME required for discharge       SUBJECTIVE:   Patient stated I am better, it was pretty bad yesterday.     OBJECTIVE DATA SUMMARY:   Critical Behavior:  Neurologic State: Alert, Appropriate for age  Orientation Level: Oriented X4  Cognition: Appropriate decision making, Appropriate for age attention/concentration, Appropriate safety awareness  Safety/Judgement: Awareness of environment, Good awareness of safety precautions, Home safety, Insight into deficits  Functional Mobility Training:  Bed Mobility:  Rolling: Supervision  Supine to Sit: Supervision  Sit to Supine: Supervision           Transfers:  Sit to Stand: Modified independent  Stand to Sit: Modified independent                             Balance:  Sitting: Intact  Standing: Intact; With support  Standing - Static: Good; Constant support  Standing - Dynamic : Good; Constant support  Ambulation/Gait Training:  Distance (ft): 300 Feet (ft)  Assistive Device: Brace/Splint; Cane, straight; Gait belt  Ambulation - Level of Assistance: Supervision        Gait Abnormalities: Antalgic;  Shuffling gait        Base of Support: Widened     Speed/Shayna: Slow  Step Length: Left shortened; Right shortened            Pain Rating:  controlled    Activity Tolerance:   Good, Fair, and SpO2 stable on RA    After treatment patient left in no apparent distress:   Sitting in chair, Call bell within reach, and Caregiver / family present    COMMUNICATION/COLLABORATION:   The patients plan of care was discussed with: Registered nurse and Case management.      Yamial Prieto, PT

## 2021-12-03 NOTE — DISCHARGE SUMMARY
Spine Discharge Summary    Patient ID:  Gera Gordon  [id number]  male  45 y.o.  1970    Admit date: 11/30/2021    Discharge date: 12/2/21    Admitting Physician: Linda Krishnamurthy MD     Consulting Physician(s):   Treatment Team: Utilization Review: Joe Eduardo RN; Care Manager: Jesse Howe    Date of Surgery:   11/30/2021     Preoperative Diagnosis:  SPINAL STENOSIS    Postoperative Diagnosis:   SPINAL STENOSIS    Procedure(s):  L5-S1 LUMBAR LAMINECTOMY WITH POSTERIOR LUMBAR FUSION (O ARM/S8)     Anesthesia Type:   General     Surgeon: Sebastián Riddle MD                            HPI:  Pt is a 45 y.o. male who has a history of SPINAL STENOSIS  with pain and limitations of activities of daily living who presents at this time for a L5-S1 LUMBAR LAMINECTOMY WITH POSTERIOR LUMBAR FUSION (O ARM/S8)  following the failure of conservative management. PMH:   Past Medical History:   Diagnosis Date    Adverse effect of anesthesia     SLOW TO AROUSE     Allergic rhinitis 4/8/2012    Asthma 4/8/2012    Balance disorder     Cancer ([facility] [address]     skin ca exision from scalp w/ subsequent  local numbness BCC FOREHEAD    GERD (gastroesophageal reflux disease)     h/o gerd    HX OTHER MEDICAL     rt shoulder posterior instability     Hypertension     Intractable chronic migraine without aura 2/9/2019    Dr.Kim Charles    Migraine 4/8/2012    Post concussion syndrome 2/9/2019    Psychiatric disorder     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 dislocated jaw . eval'd by ENT . Dr. Batsheva Angelo and Dentist     Tremor, coarse 2/9/2019    Vertigo        Body mass index is 31.99 kg/m². : A BMI > 30 is classified as obesity and > 40 is classified as morbid obesity.      Medications upon admission :   Prior to Admission Medications   Prescriptions Last Dose Informant Patient Reported? Taking? Cetirizine (ZYRTEC) 10 mg Cap 2021 at Unknown time  Yes Yes   Sig: Take  by mouth. acetaminophen (Tylenol Extra Strength) 500 mg tablet 2021 at Unknown time  Yes Yes   Sig: Take 1,000 mg by mouth every six (6) hours as needed for Pain. acetaminophen/caffeine (EXCEDRIN TENSION HEADACHE PO) 2021  Yes No   Sig: Take 2 Tablets by mouth as needed. albuterol (PROVENTIL HFA, VENTOLIN HFA) 90 mcg/actuation inhaler Unknown at Unknown time  Yes No   Sig: Take 2 Puffs by inhalation every four (4) hours as needed. amLODIPine-benazepril (LOTREL) 5-20 mg per capsule 2021 at Unknown time  No Yes   Sig: TAKE 1 CAPSULE DAILY (NEEDS APPOINTMENT)   bismuth subsalicylate (PEPTO-BISMOL) 262 mg/15 mL suspension 2021  Yes No   Sig: Take 30 mL by mouth every six (6) hours as needed for Indigestion. chlorphen/pseudoeph/ibuprofen (ADVIL ALLERGY SINUS PO) 2021  Yes No   Sig: Take 1 Tablet by mouth as needed. diclofenac potassium (CATAFLAM) 50 mg tablet 10/30/2021  Yes No   Sig: Take 50 mg by mouth as needed. fluorouraciL (EFUDEX) 5 % chemo cream 10/30/2021 at Unknown time  Yes Yes   fluticasone (FLONASE) 50 mcg/actuation nasal spray 2021 at Unknown time  Yes Yes   [place] by Both Nostrils route two (2) times a day. ibuprofen (AdviL) 200 mg tablet 2021  Yes No   Sig: Take 400 mg by mouth every six (6) hours as needed for Pain. loperamide (IMMODIUM) 2 mg tablet 2021  Yes No   Sig: Take 2 mg by mouth four (4) times daily as needed for Diarrhea.   magnesium oxide (MAG-OX) 400 mg tablet 2021  No No   Sig: Take 1 Tab by mouth nightly. melatonin tab tablet 2021  Yes No   Sig: Take 5 mg by mouth nightly.    memantine ER (NAMENDA XR) 14 mg capsule 2021 at Unknown time  No Yes   Sig: TAKE 1 CAPSULE DAILY   montelukast (SINGULAIR) 10 mg tablet 11/30/2021 at Unknown time  Yes Yes   Sig: Take 10 mg by mouth daily. naproxen sodium (Aleve) 220 mg tablet Unknown at Unknown time  Yes No   Sig: Take 220 mg by mouth as needed. onabotulinumtoxinA (Botox) 200 unit injection 11/4/2021  No No   Sig: Inject selected muscles head and neck bilaterally every 12 weeks  Indications: migraine prevention   venlafaxine-SR (EFFEXOR-XR) 150 mg capsule 11/30/2021 at Unknown time  No Yes   Sig: TAKE 1 CAPSULE DAILY      Facility-Administered Medications: None        Allergies:  No Known Allergies     Hospital Course: The patient underwent surgery. Complications:  None; patient tolerated the procedure well. Was taken to the PACU in stable condition and then transferred to the ortho floor. Perioperative Antibiotics:  Ancef     Postoperative Pain Management:  Oxycodone & Tylenol     Postoperative transfusions:    Number of units banked PRBCs =   none     Post Op complications: none    Hemoglobin at discharge:    Lab Results   Component Value Date/Time    HGB 12.4 12/02/2021 04:06 AM    INR 1.0 11/24/2021 12:33 PM       Dressing was changed on POD # 1&2. Incision - clean, dry and intact. No significant erythema or swelling. Wound appears to be healing without any evidence of infection. Pt had a HVAC drain that was removed on POD# 2. Neurovascular exam found to be within normal limits. Physical Therapy started following surgery and participated in bed mobility, transfers and ambulation. Gait:  Gait  Base of Support: Widened  Speed/Shayna: Slow  Step Length: Left shortened, Right shortened  Gait Abnormalities: Antalgic, Shuffling gait  Ambulation - Level of Assistance: Supervision  Distance (ft): 300 Feet (ft)  Assistive Device: Brace/Splint, Cane, straight, Gait belt  Rail Use: Right   Stairs - Level of Assistance: Contact guard assistance  Number of Stairs Trained: 14                   Discharged to: Home.     Condition on Discharge:   good    Discharge Dr. Mariee [phone] instructions:  - Take pain medications as prescribed  - Resume pre hospital diet      - Discharge activity: activity as tolerated  - Ambulate as tolerated  - Avoid bending, lifting and twisting  - Lumbar brace when oob  - Wound Care Keep wound clean and dry. See discharge instruction sheet. -DISCHARGE MEDICATION LIST     Discharge Medication List as of 12/2/2021  1:45 PM      START taking these medications    Details   senna-docusate (PERICOLACE) 8.6-50 mg per tablet Take 1 Tablet by mouth two (2) times a day., Normal, Disp-30 Tablet, R-0      oxyCODONE IR (ROXICODONE) 5 mg immediate release tablet Take 1 Tablet by mouth every four (4) hours as needed for Pain for up to 14 days. Max Daily Amount: 30 mg., Normal, Disp-50 Tablet, R-0         CONTINUE these medications which have NOT CHANGED    Details   acetaminophen (Tylenol Extra Strength) 500 mg tablet Take 1,000 mg by mouth every six (6) hours as needed for Pain., Historical Med      fluorouraciL (EFUDEX) 5 % chemo cream Historical Med      venlafaxine-SR (EFFEXOR-XR) 150 mg capsule TAKE 1 CAPSULE DAILY, Normal, Disp-15 Capsule, R-0      memantine ER (NAMENDA XR) 14 mg capsule TAKE 1 CAPSULE DAILY, Normal, Disp-90 Cap, R-3      amLODIPine-benazepril (LOTREL) 5-20 mg per capsule TAKE 1 CAPSULE DAILY (NEEDS APPOINTMENT), Normal, Disp-90 Cap, R-3      fluticasone (FLONASE) 50 mcg/actuation nasal spray 1 [place] by Both Nostrils route two (2) times a day., Historical Med      montelukast (SINGULAIR) 10 mg tablet Take 10 mg by mouth daily.   , Historical Med      Cetirizine (ZYRTEC) 10 mg Cap Take  by mouth.  , Historical Med      chlorphen/pseudoeph/ibuprofen (ADVIL ALLERGY SINUS PO) Take 1 Tablet by mouth as needed., Historical Med      loperamide (IMMODIUM) 2 mg tablet Take 2 mg by mouth four (4) times daily as needed for Diarrhea., Historical Med      bismuth subsalicylate (PEPTO-BISMOL) 262 mg/15 mL suspension Take 30 mL by mouth every six (6) hours as needed for Indigestion. , Historical Med      acetaminophen/caffeine (EXCEDRIN TENSION HEADACHE PO) Take 2 Tablets by mouth as needed., Historical Med      onabotulinumtoxinA (Botox) 200 unit injection Inject selected muscles head and neck bilaterally every 12 weeks  Indications: migraine prevention, Print, Disp-200 Units,R-3      melatonin tab tablet Take 5 mg by mouth nightly., Historical Med      magnesium oxide (MAG-OX) 400 mg tablet Take 1 Tab by mouth nightly., Normal, Disp-90 Tab, R-0      albuterol (PROVENTIL HFA, VENTOLIN HFA) 90 mcg/actuation inhaler Take 2 Puffs by inhalation every four (4) hours as needed.  , Historical Med         STOP taking these medications       ibuprofen (AdviL) 200 mg tablet Comments:   Reason for Stopping:         naproxen sodium (Aleve) 220 mg tablet Comments:   Reason for Stopping:         diclofenac potassium (CATAFLAM) 50 mg tablet Comments:   Reason for Stopping:            per medical continuation form      -Follow up in office in 2 weeks      Signed:  GERSON Tay     12/3/2021  10:20 AM

## 2021-12-15 ENCOUNTER — OFFICE VISIT (OUTPATIENT)
Dept: ORTHOPEDIC SURGERY | Age: 51
End: 2021-12-15

## 2021-12-15 VITALS — WEIGHT: 225 LBS | HEIGHT: 71 IN | BODY MASS INDEX: 31.5 KG/M2

## 2021-12-15 DIAGNOSIS — Z98.1 S/P LUMBAR FUSION: Primary | ICD-10-CM

## 2021-12-15 DIAGNOSIS — M43.17 SPONDYLOLISTHESIS OF LUMBOSACRAL REGION: ICD-10-CM

## 2021-12-15 PROCEDURE — 99024 POSTOP FOLLOW-UP VISIT: CPT | Performed by: PHYSICIAN ASSISTANT

## 2021-12-15 RX ORDER — OXYCODONE HYDROCHLORIDE 5 MG/1
5-10 TABLET ORAL
Qty: 60 TABLET | Refills: 0 | Status: SHIPPED | OUTPATIENT
Start: 2021-12-15 | End: 2021-12-22

## 2021-12-15 NOTE — PROGRESS NOTES
Jacqueline Cruz (: 1970) is a 45 y.o. male patient here for evaluation of the following chief complaint(s):  Back Pain and Surgical Follow-up (Sx 2021 L5/S1 Lami/PLF (PO #1))         ASSESSMENT/PLAN:  Below is the assessment and plan developed based on review of pertinent history, physical exam, labs, studies, and medications. 1. S/P lumbar fusion  -     XR SPINE LUMB 2 OR 3 V; Future  -     oxyCODONE IR (ROXICODONE) 5 mg immediate release tablet; Take 1-2 Tablets by mouth every four (4) hours as needed for Pain for up to 7 days. Max Daily Amount: 60 mg., Normal, Disp-60 Tablet, R-0  2. Spondylolisthesis of lumbosacral region      The patient's radiologic findings have been reviewed with him in detail today. He is doing exceptionally well at this point is postoperative recovery. We have discussed increasing his oxycodone at bedtime, and interday if needed. He may be a candidate for steroid pack if his back and leg symptoms persist, however he would like to hold off on this for now. He has declined gabapentin due to prior history of side effects. He will continue wearing his QuickDraw brace, continue limiting his bending, lifting, and twisting. [place] will be [place] to [place] for the [place] to be with family, and understands that he should plan to stop frequently to walk. We will see him back for his next postoperative follow-up visit in 4 weeks. A temporary handicap placard has been given today. Medications have been refilled as requested. Return in about 4 weeks (around 2022) for Post op follow up, With Dr. Edgardo Browne. SUBJECTIVE/OBJECTIVE:  Jacqueline Cruz (: 1970) is a 45 y.o. male who presents today for the following:  Chief Complaint   Patient presents with    Back Pain    Surgical Follow-up     Sx 2021 L5/S1 Lami/PLF (PO #1)        HPI   Mr. Sina Grayson presents today for a routine postoperative follow-up visit.   He is approximately 2 weeks out from his recent posterior lumbar fusion. He is doing exceptionally well. He is having some difficulty with pain control at bedtime, with low back pain radiating to bilateral buttocks and bilateral proximal posterior thighs. He has been using Tylenol throughout the day every 4 hours as well as oxycodone 5 mg at bedtime. His incision is well-healing without any drainage. He has been tolerating a QuickDraw brace. IMAGING:  XR Results (most recent):  Results from Appointment encounter on 12/15/21    XR SPINE LUMB 2 OR 3 V    Narrative  AP and lateral films of the lumbar spine reveal a stable posterior lumbar fusion at L5-S1 with stable instrumentation. MRI Results (most recent):  Results from [place] encounter on 06/10/20    MRI CERV SPINE WO CONT    Narrative  EXAM: MRI CERV SPINE WO CONT    INDICATION: History of degenerative disc disease of the C-spine progressively  getting worse. . Neck pain radiating down the right side    COMPARISON: MRI of 6/7/2018    TECHNIQUE: MR imaging of the cervical spine was performed using the following  sequences: sagittal T1, T2, STIR;  axial T2, T1.    CONTRAST:  None. FINDINGS:    There is normal alignment of the cervical spine. Vertebral body heights are  maintained. Marrow signal is normal.    The craniocervical junction is intact. The course, caliber, and signal intensity  of the spinal cord are normal.    The paraspinal soft tissues are within normal limits. C2-C3: No herniation or stenosis. C3-C4: No herniation or stenosis. Minimal bulging disc. C4-C5: No herniation or stenosis. Minimal bulging disc. C5-C6: Disc degeneration with moderate loss of disc height. Right posterior  lateral disc osteophyte complex with severe narrowing of the right lateral  recess and foramen. Progression since 2018. C6-C7: No herniation or stenosis. C7-T1: No herniation or stenosis.     The previously noted T2-3 disc bulge is not completely included on this  examination. Impression  IMPRESSION:  Large right C5-6 disc osteophyte complex with severe right foraminal narrowing,  with progression since 6/7/2018. No Known Allergies    Current Outpatient Medications   Medication Sig    oxyCODONE IR (ROXICODONE) 5 mg immediate release tablet Take 1-2 Tablets by mouth every four (4) hours as needed for Pain for up to 7 days. Max Daily Amount: 60 mg.    senna-docusate (PERICOLACE) 8.6-50 mg per tablet Take 1 Tablet by mouth two (2) times a day.  acetaminophen (Tylenol Extra Strength) 500 mg tablet Take 1,000 mg by mouth every six (6) hours as needed for Pain.  chlorphen/pseudoeph/ibuprofen (ADVIL ALLERGY SINUS PO) Take 1 Tablet by mouth as needed.  loperamide (IMMODIUM) 2 mg tablet Take 2 mg by mouth four (4) times daily as needed for Diarrhea.  bismuth subsalicylate (PEPTO-BISMOL) 262 mg/15 mL suspension Take 30 mL by mouth every six (6) hours as needed for Indigestion.  acetaminophen/caffeine (EXCEDRIN TENSION HEADACHE PO) Take 2 Tablets by mouth as needed.  fluorouraciL (EFUDEX) 5 % chemo cream     venlafaxine-SR (EFFEXOR-XR) 150 mg capsule TAKE 1 CAPSULE DAILY    memantine ER (NAMENDA XR) 14 mg capsule TAKE 1 CAPSULE DAILY    amLODIPine-benazepril (LOTREL) 5-20 mg per capsule TAKE 1 CAPSULE DAILY (NEEDS APPOINTMENT)    onabotulinumtoxinA (Botox) 200 unit injection Inject selected muscles head and neck bilaterally every 12 weeks  Indications: migraine prevention    melatonin tab tablet Take 5 mg by mouth nightly.  magnesium oxide (MAG-OX) 400 mg tablet Take 1 Tab by mouth nightly.  fluticasone (FLONASE) 50 mcg/actuation nasal spray 1 [place] by Both Nostrils route two (2) times a day.  montelukast (SINGULAIR) 10 mg tablet Take 10 mg by mouth daily.  Cetirizine (ZYRTEC) 10 mg Cap Take  by mouth.  albuterol (PROVENTIL HFA, VENTOLIN HFA) 90 mcg/actuation inhaler Take 2 Puffs by inhalation every four (4) hours as needed. No current facility-administered medications for this visit. Past Medical History:   Diagnosis Date    Adverse effect of anesthesia     SLOW TO AROUSE     Allergic rhinitis 4/8/2012    Asthma 4/8/2012    Balance disorder     Cancer ([address]     skin ca exision from scalp w/ subsequent  local numbness BCC FOREHEAD    GERD (gastroesophageal reflux disease)     h/o gerd    HX OTHER MEDICAL     rt shoulder posterior instability     Hypertension     Intractable chronic migraine without aura 2/9/2019    Dr.Kim Charles    Migraine 4/8/2012    Post concussion syndrome 2/9/2019    Psychiatric disorder     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 dislocated jaw . eval'd by ENT . Dr. Mandi Painting and Dentist     paulette Curtis 2/9/2019    Vertigo         Past Surgical History:   Procedure Laterality Date    HX BACK SURGERY  2020    Cervical C5- C6 DISC surgery     HX CHOLECYSTECTOMY      HX HEENT  march 2015    Septoplasty, sinoplasty, turbinate reduction [facility]      inguinal     HX ORTHOPAEDIC  12/2018    Right Rotator Cuff Repair. Mara Ying MD CARTILADGE, BONE SPUR     HX OTHER SURGICAL      skin ca excision from face.      HX VASECTOMY      HX WISDOM TEETH EXTRACTION      X4 AT 22 YRS OLD        Family History   Problem Relation Age of Onset    Thyroid Disease Mother     Cancer Mother         BREAST    Diabetes Father     Stroke Father         CNS bleed     Thyroid Disease Sister     Lupus Sister     Parkinsonism Maternal Uncle     Dementia Paternal Grandfather     Heart Disease Paternal Uncle     Thyroid Disease Sister     Other Sister         REYNALDS     No Known Problems Son     No Known Problems Son     Anesth Problems Neg Hx         Social History     Tobacco Use    Smoking status: Never Smoker    Smokeless tobacco: Never Used   Substance Use Topics    Alcohol use: Yes     Comment: 10-14 drinks per week        Review of Systems   Constitutional: Negative. Respiratory: Negative. Cardiovascular: Negative. Gastrointestinal: Negative. Endocrine: Negative. Genitourinary: Negative. Skin: Negative. Allergic/Immunologic: Negative. Hematological: Negative. Psychiatric/Behavioral: Negative. All other systems reviewed and are negative. No flowsheet data found. Vitals:  Ht 5' 11\" (1.803 m)   Wt 225 lb (102.1 kg)   BMI 31.38 kg/m²    Body mass index is 31.38 kg/m². Physical Exam    Integumentary  Assessment of Surgical Incision - healing and consistent with normal anticipated wound healing. Neurologic  Sensory  Light Touch - Intact - Globally. Overall Assessment of Muscle Strength and Tone reveals  Lower Extremities - Right Iliopsoas - 5/5. Left Iliopsoas - 5/5. Right Tibialis Anterior - 5/5. Left Tibialis Anterior - 5/5. Right Gastroc-Soleus - 5/5. Left Gastroc-Soleus - 5/5. Right EHL - 5/5. Left EHL - 5/5. General Assessment of Reflexes  Right Ankle - Clonus is not present. Left Ankle - Clonus is not present. Reflexes (Dermatomes)  2/2 Normal - Left Achilles (L5-S2), Left Knee (L2-4), Right Achilles (L5-S2) and Right Knee (L2-4). Musculoskeletal  Global Assessment  Examination of related systems reveals - well-developed, well-nourished, in no acute distress, alert and oriented x 3 and normal coordination. Spine/Ribs/Pelvis  Lumbosacral Spine - Examination of the lumbosacral spine reveals - no known fractures or deformities. Inspection and Palpation - Tenderness - moderate. Assessment of pain reveals the following findings - The pain is characterized as - moderate. Location - pain refers to lower back bilaterally. Dr. Blue Gill was available for immediate consult during this encounter.   An electronic signature was used to authenticate this note.   -- Wilfredo Grande PA

## 2022-01-04 NOTE — PROGRESS NOTES
Botox Injection Note     2018     Patient:  Med Bourgeois   YOB: 1970  Date of Visit: 2018      Indication: patient has chronic migraine headaches greater than 15 days per month lasting more than 4 hours each. She has failed or not tolerated 2 or more medication preventatives. Written consent was signed and verified by me. Risks and benefits were discussed to include possible cosmetic asymmetry which is not permament or life threatening. Patient name and  was confirmed prior to procedure. Time out performed. Procedure:   Botox concentration: 200 units in 2 ml of preservative-free normal saline. 1:1 dilution. LOT#: [id number]  EXP:  2020    Injections and distribution as follow :      Units/site  Sites Loc Subtotal    procerus 5 1 ML 5   corrugaters 2.5 2 BL 5   frontalis 5 8 BL 40   Temporalis 10 4 BL 40   Occipitalis 10 2 BL 20   Cervical paraspinals 5 4 BL 20   Trapezius 10 4 BL 40         200 units Botox were reconstituted, 170 units injected as above and the remainder was unavoidably wasted. Patient tolerated procedure well. Return in 3 months for repeat injections.     _____________________________   Oz Tompkins DO  [address], ABPN Continue Regimen: Triamcinolone 0.1% cream bid for 2 weeks- PDT has RX at home will call if she needs a refill Detail Level: Zone Initiate Treatment: Sunscreen (SPF 30 or greater) should be applied every 1.5-2 hours, during peak UV exposure (between 10am and 2pm) and reapplied after exercise or swimming. Plan: The ABCDEs of melanoma were reviewed with the patient, and the importance of monthly self-examination of moles was emphasized. Should any moles change in shape or color, or itch, bleed or burn, pt will contact our office for evaluation sooner than their interval appointment.\\nSun protective clothing is also effective at protecting against UV rays that cause skin cancer.\\n Initiate Treatment: Nizoral shampoo to affected areas, leave on 5-10 min, then rinse; dry well; apply [place] antiperspirant to dry skin; apply zeasorb powder\\nkeep skin folds clean & dry, prevent skin-to-skin contact Samples Given: Aveeno eczema therapy, Eucerin eczema relief

## 2022-01-18 ENCOUNTER — OFFICE VISIT (OUTPATIENT)
Dept: ORTHOPEDIC SURGERY | Age: 52
End: 2022-01-18
Payer: COMMERCIAL

## 2022-01-18 VITALS — HEIGHT: 70 IN | BODY MASS INDEX: 32.21 KG/M2 | WEIGHT: 225 LBS

## 2022-01-18 DIAGNOSIS — Z98.1 S/P LUMBAR FUSION: Primary | ICD-10-CM

## 2022-01-18 PROCEDURE — 99024 POSTOP FOLLOW-UP VISIT: CPT | Performed by: ORTHOPAEDIC SURGERY

## 2022-01-18 NOTE — PROGRESS NOTES
Jennifer Alvarenga (: 1970) is a 45 y.o. male, patient, here for evaluation of the following chief complaint(s):  Surgical Follow-up       ASSESSMENT/PLAN:    Below is the assessment and plan developed based on review of pertinent history, physical exam, labs, studies, and medications. Overall doing fairly well at 6 weeks. He is not any medications at this time. We will let him start some Advil at this point. I will start some gentle physical therapy as he would like to return to activity sooner versus later. He has to lift less than 15 pounds. He will wear his lumbar corset at work. I will see him back in 6 weeks to start more aggressive physical therapy. 1. S/P lumbar fusion  -     XR SPINE LUMB 2 OR 3 V; Future  -     REFERRAL TO PHYSICAL THERAPY; Future      No follow-ups on file. SUBJECTIVE/OBJECTIVE:  Jennifer Alvarenga (: 1970) is a 45 y.o. male. No flowsheet data found. He is 6 weeks out from his lumbar fusion at L5-S1. He is doing very well. He has occasional lower back pain buttock pain but no radicular symptoms. He is not on any pain medications he is only taking Tylenol. Imaging:          XR Results (most recent):  Results from Appointment encounter on 22    XR SPINE LUMB 2 OR 3 V    Narrative  And lateral of the lumbar spine today demonstrates a stable L5-S1 fusion. No acute changes noted       MRI Results (most recent):  Results from Hospital Encounter encounter on 06/10/20    MRI CERV SPINE WO CONT    Narrative  EXAM: MRI CERV SPINE WO CONT    INDICATION: History of degenerative disc disease of the C-spine progressively  getting worse. . Neck pain radiating down the right side    COMPARISON: MRI of 2018    TECHNIQUE: MR imaging of the cervical spine was performed using the following  sequences: sagittal T1, T2, STIR;  axial T2, T1.    CONTRAST:  None. FINDINGS:    There is normal alignment of the cervical spine. Vertebral body heights are  maintained. Marrow signal is normal.    The craniocervical junction is intact. The course, caliber, and signal intensity  of the spinal cord are normal.    The paraspinal soft tissues are within normal limits. C2-C3: No herniation or stenosis. C3-C4: No herniation or stenosis. Minimal bulging disc. C4-C5: No herniation or stenosis. Minimal bulging disc. C5-C6: Disc degeneration with moderate loss of disc height. Right posterior  lateral disc osteophyte complex with severe narrowing of the right lateral  recess and foramen. Progression since 2018. C6-C7: No herniation or stenosis. C7-T1: No herniation or stenosis. The previously noted T2-3 disc bulge is not completely included on this  examination. Impression  IMPRESSION:  Large right C5-6 disc osteophyte complex with severe right foraminal narrowing,  with progression since 6/7/2018. No Known Allergies    Current Outpatient Medications   Medication Sig    senna-docusate (PERICOLACE) 8.6-50 mg per tablet Take 1 Tablet by mouth two (2) times a day.  chlorphen/pseudoeph/ibuprofen (ADVIL ALLERGY SINUS PO) Take 1 Tablet by mouth as needed.  loperamide (IMMODIUM) 2 mg tablet Take 2 mg by mouth four (4) times daily as needed for Diarrhea.  bismuth subsalicylate (PEPTO-BISMOL) 262 mg/15 mL suspension Take 30 mL by mouth every six (6) hours as needed for Indigestion.  fluorouraciL (EFUDEX) 5 % chemo cream     venlafaxine-SR (EFFEXOR-XR) 150 mg capsule TAKE 1 CAPSULE DAILY    memantine ER (NAMENDA XR) 14 mg capsule TAKE 1 CAPSULE DAILY    amLODIPine-benazepril (LOTREL) 5-20 mg per capsule TAKE 1 CAPSULE DAILY (NEEDS APPOINTMENT)    onabotulinumtoxinA (Botox) 200 unit injection Inject selected muscles head and neck bilaterally every 12 weeks  Indications: migraine prevention    melatonin tab tablet Take 5 mg by mouth nightly.  magnesium oxide (MAG-OX) 400 mg tablet Take 1 Tab by mouth nightly.     fluticasone (FLONASE) 50 mcg/actuation nasal spray 1 [place] by Both Nostrils route two (2) times a day.  montelukast (SINGULAIR) 10 mg tablet Take 10 mg by mouth daily.  Cetirizine (ZYRTEC) 10 mg Cap Take  by mouth.  albuterol (PROVENTIL HFA, VENTOLIN HFA) 90 mcg/actuation inhaler Take 2 Puffs by inhalation every four (4) hours as needed.  acetaminophen (Tylenol Extra Strength) 500 mg tablet Take 1,000 mg by mouth every six (6) hours as needed for Pain. (Patient not taking: Reported on 1/18/2022)    acetaminophen/caffeine (EXCEDRIN TENSION HEADACHE PO) Take 2 Tablets by mouth as needed. (Patient not taking: Reported on 1/18/2022)     No current facility-administered medications for this visit. Past Medical History:   Diagnosis Date    Adverse effect of anesthesia     SLOW TO AROUSE     Allergic rhinitis 4/8/2012    Asthma 4/8/2012    Balance disorder     Cancer ([address]     skin ca exision from scalp w/ subsequent  local numbness [facility] FOREHEAD    GERD (gastroesophageal reflux disease)     h/o gerd    HX OTHER MEDICAL     rt shoulder posterior instability     Hypertension     Intractable chronic migraine without aura 2/9/2019    Dr.Kim Charles    Migraine 4/8/2012    Post concussion syndrome 2/9/2019    Psychiatric disorder     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 dislocated jaw . eval'd by ENT . Dr. Mercdeez Eastman and Dentist     Tremor, coarse 2/9/2019    Vertigo         Past Surgical History:   Procedure Laterality Date    HX BACK SURGERY  2020    Cervical C5- C6 DISC surgery     HX CHOLECYSTECTOMY      HX HEENT  march 2015    Septoplasty, sinoplasty, turbinate reduction , Dr. Araceli Obrien [address]      inguinal     HX ORTHOPAEDIC  12/2018    Right Rotator Cuff Repair.  Kael Cherry MD CARTILADGE, BONE SPUR     HX OTHER SURGICAL      skin ca excision from face.  HX VASECTOMY      HX WISDOM TEETH EXTRACTION      X4 AT 22 YRS OLD        Family History   Problem Relation Age of Onset    Thyroid Disease Mother     Cancer Mother         BREAST    Diabetes Father     Stroke Father         CNS bleed     Thyroid Disease Sister     Lupus Sister     Parkinsonism Maternal Uncle     Dementia Paternal Grandfather     Heart Disease Paternal Uncle     Thyroid Disease Sister     Other Sister         REYNALDS     No Known Problems Son     No Known Problems Son     Anesth Problems Neg Hx         Social History     Tobacco Use    Smoking status: Never Smoker    Smokeless tobacco: Never Used   Vaping Use    Vaping Use: Never used   Substance Use Topics    Alcohol use: Yes     Comment: 10-14 drinks per week    Drug use: No        Review of Systems       Vitals:  Ht 5' 10\" (1.778 m)   Wt 225 lb (102.1 kg)   BMI 32.28 kg/m²    Body mass index is 32.28 kg/m². Ortho Exam       Integumentary  Assessment of Surgical Incision - healing and consistent with normal anticipated wound healing. Neurologic  Sensory  Light Touch - Intact - Globally. Overall Assessment of Muscle Strength and Tone reveals  Lower Extremities - Right Iliopsoas - 5/5. Left Iliopsoas - 5/5. Right Tibialis Anterior - 5/5. Left Tibialis Anterior - 5/5. Right Gastroc-Soleus - 5/5. Left Gastroc-Soleus - 5/5. Right EHL - 5/5. Left EHL - 5/5. General Assessment of Reflexes  Right Ankle - Clonus is not present. Left Ankle - Clonus is not present. Reflexes (Dermatomes)  2/2 Normal - Left Achilles (L5-S2), Left Knee (L2-4), Right Achilles (L5-S2) and Right Knee (L2-4). Musculoskeletal  Global Assessment  Examination of related systems reveals - well-developed, well-nourished, in no acute distress, alert and oriented x 3 and normal coordination.   Spine/Ribs/Pelvis  Lumbosacral Spine - Examination of the lumbosacral spine reveals - no known fractures or deformities. Inspection and Palpation - Tenderness - moderate. Assessment of pain reveals the following findings - The pain is characterized as - moderate. Location - pain refers to lower back bilaterally. An electronic signature was used to authenticate this note.   -- Mary Odom MD

## 2022-01-18 NOTE — PATIENT INSTRUCTIONS
Lumbar Laminectomy: What to Expect at [address]  A lumbar laminectomy is surgery to ease pressure on the spinal cord and nerves of the lower spine. The doctor took out pieces of bone that were squeezing the spinal cord and nerves. You can expect your back to feel stiff or sore after surgery. This should improve in the weeks after surgery. You may have trouble sitting or standing in one position for very long and may need pain medicine in the weeks after your surgery. Your doctor may advise you to work with a physical therapist to strengthen the muscles around your spine and trunk. You will need to learn how to lift, twist, and bend so that you don't put too much strain on your back. This care sheet gives you a general idea about how long it will take for you to recover. But each person recovers at a different pace. Follow the steps below to get better as quickly as possible. How can you care for yourself at home? Activity    · Rest when you feel tired. Getting enough sleep will help you recover.     · Try to walk each day. Start by walking a little more than you did the day before. Bit by bit, increase the amount you walk. Walking boosts blood flow and helps prevent pneumonia and constipation. Walking may also decrease your muscle soreness after surgery.     · If advised by your doctor, you may need to avoid lifting anything that would cause excessive strain on your back. This may include a child, heavy grocery bags and milk containers, a heavy briefcase or backpack, cat litter or dog food bags, or a vacuum .     · Avoid strenuous activities, such as bicycle riding, jogging, weight lifting, or aerobic exercise, until your doctor says it is okay.     · Do not drive for 2 to 4 weeks after your surgery or until your doctor says it is okay.     · Avoid riding in a car for more than 30 minutes at a time for 2 to 4 weeks after surgery.  If you must ride in a car for a longer distance, stop often to walk and stretch your legs.     · Try to change your position about every 30 minutes while sitting or standing. This will help decrease your back pain while you are healing.     · You will probably need to take 4 to 6 weeks off from work. It depends on the type of work you do and how you feel.     · You may have sex as soon as you feel able, but avoid positions that put stress on your back or cause pain. Diet    · You can eat your normal diet. If your stomach is upset, try bland, low-fat foods like plain rice, broiled chicken, toast, and yogurt.     · Drink plenty of fluids (unless your doctor tells you not to).     · You may notice that your bowel movements are not regular right after your surgery. This is common. Try to avoid constipation and straining with bowel movements. You may want to take a fiber supplement every day. If you have not had a bowel movement after a couple of days, ask your doctor about taking a mild laxative. Medicines    · Your doctor will tell you if and when you can restart your medicines. He or she will also give you instructions about taking any new medicines.     · If you take aspirin or some other blood thinner, ask your doctor if and when to start taking it again. Make sure that you understand exactly what your doctor wants you to do.     · Take pain medicines exactly as directed. ? If the doctor gave you a prescription medicine for pain, take it as prescribed. ? If you are not taking a prescription pain medicine, ask your doctor if you can take an over-the-counter medicine.     · If your doctor prescribed antibiotics, take them as directed. Do not stop taking them just because you feel better. You need to take the full course of antibiotics.     · If you think your pain medicine is making you sick to your stomach:  ? Take your medicine after meals (unless your doctor has told you not to). ? Ask your doctor for a different pain medicine.    Incision care    · If you have strips of tape on the cut (incision) the doctor made, leave the tape on for a week or until it falls off.     · Wash the area daily with warm, soapy water and pat it dry.     · Keep the area clean and dry. You may cover it with a gauze bandage if it weeps or rubs against clothing. Change the bandage every day. Exercise    · Do back exercises as instructed by your doctor.     · Your doctor may advise you to work with a physical therapist to improve the strength and flexibility of your back. Other instructions    · To reduce stiffness and help sore muscles, use a warm water bottle, a heating pad set on low, or a warm cloth on your back. Do not put heat right over the incision. Do not go to sleep with a heating pad on your skin. Follow-up care is a key part of your treatment and safety. Be sure to make and go to all appointments, and call your doctor if you are having problems. It's also a good idea to know your test results and keep a list of the medicines you take. When should you call for help? Call 911 anytime you think you may need emergency care. For example, call if:    · You passed out (lost consciousness).     · You have sudden chest pain and shortness of breath, or you cough up blood.     · You are unable to move a leg at all. Call your doctor now or seek immediate medical care if:    · You have new or worse symptoms in your legs or buttocks. Symptoms may include:  ? Numbness or tingling. ? Weakness. ? Pain.     · You lose bladder or bowel control.     · You have loose stitches, or your incision comes open.     · You have blood or fluid draining from the incision.     · You have signs of infection, such as:  ? Increased pain, swelling, warmth, or redness. ? Pus draining from the incision. ? A fever. ? Red streaks leading from the incision.    Watch closely for changes in your health, and be sure to contact your doctor if:    · You do not have a bowel movement after taking a laxative.     · You are not getting better as expected. Where can you learn more? Go to http://www.gray.com/  Enter C656 in the search box to learn more about \"Lumbar Laminectomy: What to Expect at Home. \"  Current as of: July 1, 2021               Content Version: 13.0  © 9192-3886 [employer]. Care instructions adapted under license by [employer] (which disclaims liability or warranty for this information). If you have questions about a medical condition or this instruction, always ask your healthcare professional. [address] any warranty or liability for your use of this information.

## 2022-02-10 ENCOUNTER — OFFICE VISIT (OUTPATIENT)
Dept: NEUROLOGY | Age: 52
End: 2022-02-10
Payer: COMMERCIAL

## 2022-02-10 ENCOUNTER — TELEPHONE (OUTPATIENT)
Dept: NEUROLOGY | Age: 52
End: 2022-02-10

## 2022-02-10 VITALS
SYSTOLIC BLOOD PRESSURE: 138 MMHG | OXYGEN SATURATION: 98 % | HEIGHT: 70 IN | DIASTOLIC BLOOD PRESSURE: 86 MMHG | BODY MASS INDEX: 32.5 KG/M2 | WEIGHT: 227 LBS | HEART RATE: 84 BPM

## 2022-02-10 DIAGNOSIS — G43.719 INTRACTABLE CHRONIC MIGRAINE WITHOUT AURA AND WITHOUT STATUS MIGRAINOSUS: Primary | ICD-10-CM

## 2022-02-10 PROCEDURE — 64615 CHEMODENERV MUSC MIGRAINE: CPT | Performed by: PSYCHIATRY & NEUROLOGY

## 2022-02-10 RX ORDER — HYDROCORTISONE 25 MG/G
OINTMENT TOPICAL
COMMUNITY
Start: 2022-01-31 | End: 2022-03-01 | Stop reason: CLARIF

## 2022-02-10 NOTE — TELEPHONE ENCOUNTER
Re: Botox cpt [id number]    PA  end of  but Lore Clayton is still approved through 2022. Created PA case in [employer] [id number], awaiting update.

## 2022-02-10 NOTE — PROGRESS NOTES
Chief Complaint   Patient presents with    Procedure     BOTOX     Visit Vitals  /86 (BP 1 Location: Left upper arm, BP Patient Position: Sitting)   Pulse 84   Ht 5' 10\" (1.778 m)   Wt 227 lb (103 kg)   SpO2 98%   BMI 32.57 kg/m²

## 2022-02-10 NOTE — PROGRESS NOTES
Botox Injection Note     2/10/2022     Patient:  Walter Avelar   YOB: 1970  Date of Visit: 2/10/2022      Indication: patient has chronic migraine headaches greater than 15 days per month lasting more than 4 hours each. He has failed or not tolerated 2 or more medication preventatives. Written consent was signed and verified by me. Risks and benefits were discussed to include possible cosmetic asymmetry which is not permament or life threatening. Patient name and  was confirmed prior to procedure. Time out performed. Procedure:   Botox concentration: 200 units in 2 ml of preservative-free normal saline. 1:1 dilution. LOT#: [id number]  EXP:  10 2024    Injections and distribution as follow :      Units/site  Sites Loc Subtotal    procerus 5 1 ML 5   corrugaters 2.5 2 BL 5   frontalis 5 8 BL 40   Temporalis 10 4 BL 40   Occipitalis 10 2 BL 20   Cervical paraspinals 5 4 BL 20   Trapezius 10 4 BL 40         200 units Botox were reconstituted, 170 units injected as above and the remainder was unavoidably wasted. Patient tolerated procedure well. Return in 3 months for repeat injections.     _____________________________   Radha Jones, DO  [address], ABPN

## 2022-02-11 DIAGNOSIS — M50.20 HNP (HERNIATED NUCLEUS PULPOSUS), CERVICAL: Primary | ICD-10-CM

## 2022-02-11 DIAGNOSIS — M43.17 SPONDYLOLISTHESIS OF LUMBOSACRAL REGION: ICD-10-CM

## 2022-02-11 RX ORDER — CEPHALEXIN 500 MG/1
500 CAPSULE ORAL 4 TIMES DAILY
Qty: 40 CAPSULE | Refills: 0 | Status: SHIPPED | OUTPATIENT
Start: 2022-02-11 | End: 2022-03-01 | Stop reason: CLARIF

## 2022-02-11 RX ORDER — VENLAFAXINE HYDROCHLORIDE 150 MG/1
CAPSULE, EXTENDED RELEASE ORAL
Qty: 90 CAPSULE | Refills: 3 | Status: SHIPPED | OUTPATIENT
Start: 2022-02-11

## 2022-02-11 NOTE — TELEPHONE ENCOUNTER
Re: Botox cpt [id number]    Rcvd call from  rep and she advised code is approved from 02/10/22-08/08/22    # [id number]

## 2022-03-01 ENCOUNTER — OFFICE VISIT (OUTPATIENT)
Dept: ORTHOPEDIC SURGERY | Age: 52
End: 2022-03-01
Payer: COMMERCIAL

## 2022-03-01 VITALS — WEIGHT: 225 LBS | BODY MASS INDEX: 30.48 KG/M2 | HEIGHT: 72 IN

## 2022-03-01 DIAGNOSIS — Z98.1 S/P LUMBAR FUSION: Primary | ICD-10-CM

## 2022-03-01 PROCEDURE — 99213 OFFICE O/P EST LOW 20 MIN: CPT | Performed by: ORTHOPAEDIC SURGERY

## 2022-03-01 NOTE — LETTER
3/1/2022    Patient: Lila Tamayo   YOB: 1970   Date of Visit: 3/1/2022     [address], [address] [place] [address]  [facility]    Dear Thai Mcconnell MD,      Thank you for referring Mr. Lila Tamayo to [facility] for evaluation. My notes for this consultation are attached. If you have questions, please do not hesitate to call me. I look forward to following your patient along with you.       Sincerely,    Nicole Mejia MD

## 2022-03-01 NOTE — PROGRESS NOTES
Yovani Schofield (: 1970) is a 45 y.o. male, patient, here for evaluation of the following chief complaint(s):  Surgical Follow-up       ASSESSMENT/PLAN:  Below is the assessment and plan developed based on review of pertinent history, physical exam, labs, studies, and medications. Patient presents today approximately 3 months status post recent lumbar fusion. I am very happy with his progress overall. He denies any lower extremity radiculopathy. Denies changes in bowel or bladder control. He is not taking any pain meds currently. Referral provided today to continue with physical therapy. Radiologic findings reviewed in detail with the patient. Informed the patient to call the office with any worsening of symptoms. He will follow-up in approximately 3 months. 1. S/P lumbar fusion  -     XR SPINE LUMB 2 OR 3 V; Future  -     REFERRAL TO PHYSICAL THERAPY; Future      Return in about 3 months (around 2022). SUBJECTIVE/OBJECTIVE:  Yovani Schofield (: 1970) is a 45 y.o. male. No flowsheet data found. Patient presents today approximately 3 months status post lumbar fusion. He continues to do very well overall. He denies lower extremity radiculopathy or weakness notes at this point. Denies changes in bowel or bladder control. He is no longer taking anything for pain. He started light physical therapy at his last visit, and is ready to start more aggressive PT. He has been compliant with wearing his QuickDraw brace. He states he has been compliant with his bending, lifting and twisting restrictions. Imaging:      XR Results (most recent):  Results from Appointment encounter on 22    XR SPINE LUMB 2 OR 3 V    Narrative  AP and lateral of the lumbar spine reviewed today. He has a stable L5-S1 fusion. No acute changes.        MRI Results (most recent):  Results from [place] encounter on 06/10/20    MRI CERV SPINE WO CONT    Narrative  EXAM: MRI CERV SPINE WO CONT    INDICATION: History of degenerative disc disease of the C-spine progressively  getting worse. . Neck pain radiating down the right side    COMPARISON: MRI of 6/7/2018    TECHNIQUE: MR imaging of the cervical spine was performed using the following  sequences: sagittal T1, T2, STIR;  axial T2, T1.    CONTRAST:  None. FINDINGS:    There is normal alignment of the cervical spine. Vertebral body heights are  maintained. Marrow signal is normal.    The craniocervical junction is intact. The course, caliber, and signal intensity  of the spinal cord are normal.    The paraspinal soft tissues are within normal limits. C2-C3: No herniation or stenosis. C3-C4: No herniation or stenosis. Minimal bulging disc. C4-C5: No herniation or stenosis. Minimal bulging disc. C5-C6: Disc degeneration with moderate loss of disc height. Right posterior  lateral disc osteophyte complex with severe narrowing of the right lateral  recess and foramen. Progression since 2018. C6-C7: No herniation or stenosis. C7-T1: No herniation or stenosis. The previously noted T2-3 disc bulge is not completely included on this  examination. Impression  IMPRESSION:  Large right C5-6 disc osteophyte complex with severe right foraminal narrowing,  with progression since 6/7/2018. No Known Allergies    Current Outpatient Medications   Medication Sig    venlafaxine-SR (EFFEXOR-XR) 150 mg capsule TAKE 1 CAPSULE DAILY    amLODIPine-benazepril (LOTREL) 5-20 mg per capsule TAKE 1 CAPSULE DAILY (NEEDS APPOINTMENT)    acetaminophen (Tylenol Extra Strength) 500 mg tablet Take 1,000 mg by mouth every six (6) hours as needed for Pain.  chlorphen/pseudoeph/ibuprofen (ADVIL ALLERGY SINUS PO) Take 1 Tablet by mouth as needed.  loperamide (IMMODIUM) 2 mg tablet Take 2 mg by mouth four (4) times daily as needed for Diarrhea.     bismuth subsalicylate (PEPTO-BISMOL) 262 mg/15 mL suspension Take 30 mL by mouth every six (6) hours as needed for Indigestion.  acetaminophen/caffeine (EXCEDRIN TENSION HEADACHE PO) Take 2 Tablets by mouth as needed.  memantine ER (NAMENDA XR) 14 mg capsule TAKE 1 CAPSULE DAILY    onabotulinumtoxinA (Botox) 200 unit injection Inject selected muscles head and neck bilaterally every 12 weeks  Indications: migraine prevention    melatonin tab tablet Take 5 mg by mouth nightly.  magnesium oxide (MAG-OX) 400 mg tablet Take 1 Tab by mouth nightly.  fluticasone (FLONASE) 50 mcg/actuation nasal spray 1 [place] by Both Nostrils route two (2) times a day.  montelukast (SINGULAIR) 10 mg tablet Take 10 mg by mouth daily.  Cetirizine (ZYRTEC) 10 mg Cap Take  by mouth.  albuterol (PROVENTIL HFA, VENTOLIN HFA) 90 mcg/actuation inhaler Take 2 Puffs by inhalation every four (4) hours as needed.  cephALEXin (KEFLEX) 500 mg capsule Take 1 Capsule by mouth four (4) times daily.  hydrocortisone (HYTONE) 2.5 % ointment APPLY OINTMENT TO RASH ON FACE TWO TIMES DAILY TO HELP WITH IRRITATION    senna-docusate (PERICOLACE) 8.6-50 mg per tablet Take 1 Tablet by mouth two (2) times a day.  fluorouraciL (EFUDEX) 5 % chemo cream  (Patient not taking: Reported on 2/10/2022)     No current facility-administered medications for this visit.        Past Medical History:   Diagnosis Date    Adverse effect of anesthesia     SLOW TO AROUSE     Allergic rhinitis 4/8/2012    Asthma 4/8/2012    Balance disorder     Cancer ([facility] [address]     skin ca exision from scalp w/ subsequent  local numbness BCC FOREHEAD    GERD (gastroesophageal reflux disease)     h/o gerd    HX OTHER MEDICAL     rt shoulder posterior instability     Hypertension     Intractable chronic migraine without aura 2/9/2019    Dr.Kim Charles    Migraine 4/8/2012    Post concussion syndrome 2/9/2019    Psychiatric disorder     DEPRESSION DUE TO CONCUSSIONS     Raynaud's disease     Right elbow tendinitis     Sciatica     Sinus headache 4/8/2012  Tension headache 4/8/2012    Trauma     nasal fracture, 3 concussions, soft ball eye injury     Trauma 06/2016    concussion . dislocated jaw . eval'd by ENT . Dr. Ajit Singletary and Dentist     Kirsten, paulette 2/9/2019    Vertigo         Past Surgical History:   Procedure Laterality Date    HX BACK SURGERY  2020    Cervical C5- C6 DISC surgery     HX CHOLECYSTECTOMY      HX HEENT  march 2015    Septoplasty, sinoplasty, turbinate reduction , Dr. Kathie Chung [address]      inguinal     HX ORTHOPAEDIC  12/2018    Right Rotator Cuff Repair. Jose Simpson MD CARTILADGE, BONE SPUR     HX OTHER SURGICAL      skin ca excision from face.  HX VASECTOMY      HX WISDOM TEETH EXTRACTION      X4 AT 22 YRS OLD        Family History   Problem Relation Age of Onset    Thyroid Disease Mother     Cancer Mother         BREAST    Diabetes Father     Stroke Father         CNS bleed     Thyroid Disease Sister     Lupus Sister     Parkinsonism Maternal Uncle     Dementia Paternal Grandfather     Heart Disease Paternal Uncle     Thyroid Disease Sister     Other Sister         REYNALDS     No Known Problems Son     No Known Problems Son     Anesth Problems Neg Hx         Social History     Tobacco Use    Smoking status: Never Smoker    Smokeless tobacco: Never Used   Vaping Use    Vaping Use: Never used   Substance Use Topics    Alcohol use: Yes     Comment: 10-14 drinks per week    Drug use: No        Review of Systems   Constitutional: Negative for chills, fatigue and fever. Respiratory: Negative for cough and shortness of breath. Cardiovascular: Negative for chest pain. Gastrointestinal: Negative for nausea and vomiting. Musculoskeletal: Positive for back pain. Negative for neck pain. Skin: Negative for color change and wound. Neurological: Negative for dizziness, weakness and numbness.           Vitals:  Ht 6' (1.829 m)   Wt 225 lb (102.1 kg)   BMI 30.52 kg/m²    Body mass index is 30.52 kg/m². Physical Exam  Integumentary  Assessment of Surgical Incision - healing and consistent with normal anticipated wound healing. Neurologic  Sensory  Light Touch - Intact - Globally. Overall Assessment of Muscle Strength and Tone reveals  Lower Extremities - Right Iliopsoas - 5/5. Left Iliopsoas - 5/5. Right Tibialis Anterior - 5/5. Left Tibialis Anterior - 5/5. Right Gastroc-Soleus - 5/5. Left Gastroc-Soleus - 5/5. Right EHL - 5/5. Left EHL - 5/5. General Assessment of Reflexes  Right Ankle - Clonus is not present. Left Ankle - Clonus is not present. Reflexes (Dermatomes)  2/2 Normal - Left Achilles (L5-S2), Left Knee (L2-4), Right Achilles (L5-S2) and Right Knee (L2-4). Musculoskeletal  Global Assessment  Examination of related systems reveals - well-developed, well-nourished, in no acute distress, alert and oriented x 3 and normal coordination. Spine/Ribs/Pelvis  Lumbosacral Spine - Examination of the lumbosacral spine reveals - no known fractures or deformities. Inspection and Palpation - Tenderness -nontender to palpation over spinous processes or paraspinal muscles. [address]. MD Vivian    An electronic signature was used to authenticate this note.

## 2022-03-18 PROBLEM — M43.10 SPONDYLOLISTHESIS: Status: ACTIVE | Noted: 2021-11-30

## 2022-03-18 PROBLEM — Z87.820 H/O MULTIPLE CONCUSSIONS: Status: ACTIVE | Noted: 2019-02-09

## 2022-03-19 PROBLEM — M50.20 HNP (HERNIATED NUCLEUS PULPOSUS), CERVICAL: Status: ACTIVE | Noted: 2020-12-02

## 2022-03-19 PROBLEM — G43.719 INTRACTABLE CHRONIC MIGRAINE WITHOUT AURA: Status: ACTIVE | Noted: 2019-02-09

## 2022-03-19 PROBLEM — M48.02 CERVICAL SPINAL STENOSIS: Status: ACTIVE | Noted: 2020-12-02

## 2022-03-20 PROBLEM — F07.81 POST CONCUSSION SYNDROME: Status: ACTIVE | Noted: 2019-02-09

## 2022-03-20 PROBLEM — G25.2 TREMOR, COARSE: Status: ACTIVE | Noted: 2019-02-09

## 2022-03-20 PROBLEM — Z98.1 S/P SPINAL FUSION: Status: ACTIVE | Noted: 2022-01-18

## 2022-03-31 RX ORDER — MEMANTINE HYDROCHLORIDE 14 MG/1
CAPSULE, EXTENDED RELEASE ORAL
Qty: 90 CAPSULE | Refills: 3 | Status: SHIPPED | OUTPATIENT
Start: 2022-03-31

## 2022-04-11 DIAGNOSIS — Z98.1 S/P LUMBAR FUSION: ICD-10-CM

## 2022-05-04 ENCOUNTER — TELEPHONE (OUTPATIENT)
Dept: NEUROLOGY | Age: 52
End: 2022-05-04

## 2022-05-04 NOTE — TELEPHONE ENCOUNTER
Re: botox    Reviewing schedule and see PA has , created new CMM Key# [id number] with [facility], awaiting update. .     [id number] is still approved at this time. SSP is listed as [facility]. Added pt to botox 1 just in case.

## 2022-05-12 ENCOUNTER — OFFICE VISIT (OUTPATIENT)
Dept: NEUROLOGY | Age: 52
End: 2022-05-12
Payer: COMMERCIAL

## 2022-05-12 VITALS
SYSTOLIC BLOOD PRESSURE: 144 MMHG | OXYGEN SATURATION: 97 % | HEIGHT: 72 IN | BODY MASS INDEX: 31.97 KG/M2 | WEIGHT: 236 LBS | HEART RATE: 82 BPM | DIASTOLIC BLOOD PRESSURE: 88 MMHG

## 2022-05-12 DIAGNOSIS — G43.719 INTRACTABLE CHRONIC MIGRAINE WITHOUT AURA AND WITHOUT STATUS MIGRAINOSUS: Primary | ICD-10-CM

## 2022-05-12 PROCEDURE — 64615 CHEMODENERV MUSC MIGRAINE: CPT | Performed by: PSYCHIATRY & NEUROLOGY

## 2022-05-12 NOTE — PROGRESS NOTES
Botox Injection Note     2022     Patient:  Jakob Castillo   YOB: 1970  Date of Visit: 2022      Indication: patient has chronic migraine headaches greater than 15 days per month lasting more than 4 hours each. He has failed or not tolerated 2 or more medication preventatives. Written consent was signed and verified by me. Risks and benefits were discussed to include possible cosmetic asymmetry which is not permament or life threatening. Patient name and  was confirmed prior to procedure. Time out performed. Procedure:   Botox concentration: 200 units in 2 ml of preservative-free normal saline. 1:1 dilution. LOT#: [id number]  EXP:  2025    Injections and distribution as follow :      Units/site  Sites Loc Subtotal    procerus 5 1 ML 5   corrugaters 2.5 2 BL 5   frontalis 5 8 BL 40   Temporalis 10 4 BL 40   Occipitalis 10 2 BL 20   Cervical paraspinals 5 4 BL 20   Trapezius 10 4 BL 40         200 units Botox were reconstituted, 170 units injected as above and the remainder was unavoidably wasted. Patient tolerated procedure well. Return in 3 months for repeat injections.     _____________________________   Lillie Sorto,   [address], ABPN

## 2022-05-12 NOTE — PROGRESS NOTES
Chief Complaint   Patient presents with    Procedure     Botox     Visit Vitals  BP (!) 144/88 (BP 1 Location: Left upper arm, BP Patient Position: Sitting)   Pulse 82   Ht 6' (1.829 m)   Wt 236 lb (107 kg)   SpO2 97%   BMI 32.01 kg/m²

## 2022-05-16 NOTE — TELEPHONE ENCOUNTER
Re: Botox    Rcvd [facility] back, scanned to chart.  Per document PA is not required for [phone] but PA is needed for ., [facility]SP under medical is listed as [facility], confirmed the pharmacy PA is approved and med was delivered by [facility] on 05/11/22

## 2022-06-07 ENCOUNTER — OFFICE VISIT (OUTPATIENT)
Dept: ORTHOPEDIC SURGERY | Age: 52
End: 2022-06-07
Payer: COMMERCIAL

## 2022-06-07 DIAGNOSIS — M54.50 CHRONIC BILATERAL LOW BACK PAIN WITHOUT SCIATICA: ICD-10-CM

## 2022-06-07 DIAGNOSIS — Z98.1 S/P LUMBAR FUSION: Primary | ICD-10-CM

## 2022-06-07 DIAGNOSIS — G89.29 CHRONIC BILATERAL LOW BACK PAIN WITHOUT SCIATICA: ICD-10-CM

## 2022-06-07 PROCEDURE — 99213 OFFICE O/P EST LOW 20 MIN: CPT | Performed by: ORTHOPAEDIC SURGERY

## 2022-06-07 NOTE — LETTER
6/7/2022    Patient: Mercedez Riojas   YOB: 1970   Date of Visit: 6/7/2022     [address], [address] [place] [place] [address]  V[address] In Basket    Dear Yara Feliz MD,      Thank you for referring Mr. Mercedez Riojas to [facility] for evaluation. My notes for this consultation are attached. If you have questions, please do not hesitate to call me. I look forward to following your patient along with you.       Sincerely,    Laurent Kent MD

## 2022-06-07 NOTE — PROGRESS NOTES
[place] Segovia (: 1970) is a 45 y.o. male, patient, here for evaluation of the following chief complaint(s):  LOW BACK PAIN       ASSESSMENT/PLAN:  Below is the assessment and plan developed based on review of pertinent history, physical exam, labs, studies, and medications. Patient presents today approximately 6 months status post recent lumbar fusion. I am very happy with his progress overall. He denies any lower extremity radiculopathy. Denies changes in bowel or bladder control. He is not taking any pain meds currently. Referral provided today to continue with physical therapy. Radiologic findings reviewed in detail with the patient. Informed the patient to call the office with any worsening of symptoms. He will follow-up in approximately 6 months for his 1 year visit. He will continue with over-the-counter medications at this time    1. S/P lumbar fusion  -     XR SPINE LUMB MIN 4 V; Future  2. Chronic bilateral low back pain without sciatica      No follow-ups on file. SUBJECTIVE/OBJECTIVE:  [place] Segovia (: 1970) is a 45 y.o. male. No flowsheet data found. Patient presents today approximately 6 months status post lumbar fusion. He continues to do very well overall. He denies lower extremity radiculopathy or weakness notes at this point. Denies changes in bowel or bladder control. He is no longer taking anything for pain. He started light physical therapy at his last visit, and is ready to start more aggressive PT. He has been compliant with wearing his QuickDraw brace. He states he has been compliant with his bending, lifting and twisting restrictions. Imaging:      XR Results (most recent):  Results from Appointment encounter on 22    XR SPINE LUMB MIN 4 V    Narrative  AP and lateral of the lumbar spine demonstrates a stable L5-S1 fusion. No acute changes noted.        MRI Results (most recent):  Results from [place] encounter on 06/10/20    MRI CERV SPINE WO CONT    Narrative  EXAM: MRI CERV SPINE WO CONT    INDICATION: History of degenerative disc disease of the C-spine progressively  getting worse. . Neck pain radiating down the right side    COMPARISON: MRI of 6/7/2018    TECHNIQUE: MR imaging of the cervical spine was performed using the following  sequences: sagittal T1, T2, STIR;  axial T2, T1.    CONTRAST:  None. FINDINGS:    There is normal alignment of the cervical spine. Vertebral body heights are  maintained. Marrow signal is normal.    The craniocervical junction is intact. The course, caliber, and signal intensity  of the spinal cord are normal.    The paraspinal soft tissues are within normal limits. C2-C3: No herniation or stenosis. C3-C4: No herniation or stenosis. Minimal bulging disc. C4-C5: No herniation or stenosis. Minimal bulging disc. C5-C6: Disc degeneration with moderate loss of disc height. Right posterior  lateral disc osteophyte complex with severe narrowing of the right lateral  recess and foramen. Progression since 2018. C6-C7: No herniation or stenosis. C7-T1: No herniation or stenosis. The previously noted T2-3 disc bulge is not completely included on this  examination. Impression  IMPRESSION:  Large right C5-6 disc osteophyte complex with severe right foraminal narrowing,  with progression since 6/7/2018. No Known Allergies    Current Outpatient Medications   Medication Sig    memantine ER (NAMENDA XR) 14 mg capsule TAKE 1 CAPSULE DAILY    venlafaxine-SR (EFFEXOR-XR) 150 mg capsule TAKE 1 CAPSULE DAILY    amLODIPine-benazepril (LOTREL) 5-20 mg per capsule TAKE 1 CAPSULE DAILY (NEEDS APPOINTMENT)    acetaminophen (Tylenol Extra Strength) 500 mg tablet Take 1,000 mg by mouth every six (6) hours as needed for Pain.  chlorphen/pseudoeph/ibuprofen (ADVIL ALLERGY SINUS PO) Take 1 Tablet by mouth as needed.     loperamide (IMMODIUM) 2 mg tablet Take 2 mg by mouth four (4) times daily as needed for Diarrhea.  bismuth subsalicylate (PEPTO-BISMOL) 262 mg/15 mL suspension Take 30 mL by mouth every six (6) hours as needed for Indigestion.  acetaminophen/caffeine (EXCEDRIN TENSION HEADACHE PO) Take 2 Tablets by mouth as needed.  onabotulinumtoxinA (Botox) 200 unit injection Inject selected muscles head and neck bilaterally every 12 weeks  Indications: migraine prevention    melatonin tab tablet Take 5 mg by mouth nightly.  magnesium oxide (MAG-OX) 400 mg tablet Take 1 Tab by mouth nightly.  fluticasone (FLONASE) 50 mcg/actuation nasal spray 1 [place] by Both Nostrils route two (2) times a day.  montelukast (SINGULAIR) 10 mg tablet Take 10 mg by mouth daily.  Cetirizine (ZYRTEC) 10 mg Cap Take  by mouth.  albuterol (PROVENTIL HFA, VENTOLIN HFA) 90 mcg/actuation inhaler Take 2 Puffs by inhalation every four (4) hours as needed. No current facility-administered medications for this visit. Past Medical History:   Diagnosis Date    Adverse effect of anesthesia     SLOW TO AROUSE     Allergic rhinitis 4/8/2012    Asthma 4/8/2012    Balance disorder     Cancer ([facility] [address]     skin ca exision from scalp w/ subsequent  local numbness BCC FOREHEAD    GERD (gastroesophageal reflux disease)     h/o gerd    HX OTHER MEDICAL     rt shoulder posterior instability     Hypertension     Intractable chronic migraine without aura 2/9/2019    Dr.Kim Charles    Migraine 4/8/2012    Post concussion syndrome 2/9/2019    Psychiatric disorder     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 dislocated jaw . eval'd by ENT .  Dr. Lili Leal and Dentist     Kirsten, coarse 2/9/2019    Vertigo         Past Surgical History:   Procedure Laterality Date    HX BACK SURGERY 2020    Cervical C5- C6 DISC surgery     HX CHOLECYSTECTOMY      HX HEENT  march 2015    Septoplasty, sinoplasty, turbinate reduction , Dr. Jana Espinoza      inguinal     HX ORTHOPAEDIC  12/2018    Right Rotator Cuff Repair. Angeline Maloney MD CARTILADGE, BONE SPUR     HX OTHER SURGICAL      skin ca excision from face.  HX VASECTOMY      HX WISDOM TEETH EXTRACTION      X4 AT 22 YRS OLD        Family History   Problem Relation Age of Onset    Thyroid Disease Mother     Cancer Mother         BREAST    Diabetes Father     Stroke Father         CNS bleed     Thyroid Disease Sister     Lupus Sister     Parkinsonism Maternal Uncle     Dementia Paternal Grandfather     Heart Disease Paternal Uncle     Thyroid Disease Sister     Other Sister         REYNALDS     No Known Problems Son     No Known Problems Son     Anesth Problems Neg Hx         Social History     Tobacco Use    Smoking status: Never Smoker    Smokeless tobacco: Never Used   Vaping Use    Vaping Use: Never used   Substance Use Topics    Alcohol use: Yes     Comment: 10-14 drinks per week    Drug use: No        Review of Systems   Constitutional: Negative for chills, fatigue and fever. Respiratory: Negative for cough and shortness of breath. Cardiovascular: Negative for chest pain. Gastrointestinal: Negative for nausea and vomiting. Musculoskeletal: Positive for back pain. Negative for neck pain. Skin: Negative for color change and wound. Neurological: Negative for dizziness, weakness and numbness. All other systems reviewed and are negative. Vitals: There were no vitals taken for this visit. There is no height or weight on file to calculate BMI. Physical Exam  Integumentary  Assessment of Surgical Incision - healing and consistent with normal anticipated wound healing. Neurologic  Sensory  Light Touch - Intact - Globally.   Overall Assessment of Muscle Strength and Tone reveals  Lower Extremities - Right Iliopsoas - 5/5. Left Iliopsoas - 5/5. Right Tibialis Anterior - 5/5. Left Tibialis Anterior - 5/5. Right Gastroc-Soleus - 5/5. Left Gastroc-Soleus - 5/5. Right EHL - 5/5. Left EHL - 5/5. General Assessment of Reflexes  Right Ankle - Clonus is not present. Left Ankle - Clonus is not present. Reflexes (Dermatomes)  2/2 Normal - Left Achilles (L5-S2), Left Knee (L2-4), Right Achilles (L5-S2) and Right Knee (L2-4). Musculoskeletal  Global Assessment  Examination of related systems reveals - well-developed, well-nourished, in no acute distress, alert and oriented x 3 and normal coordination. Spine/Ribs/Pelvis  Lumbosacral Spine - Examination of the lumbosacral spine reveals - no known fractures or deformities. Inspection and Palpation - Tenderness -nontender to palpation over spinous processes or paraspinal muscles. Soren Lino. MD Vivian    An electronic signature was used to authenticate this note.

## 2022-06-10 ENCOUNTER — OFFICE VISIT (OUTPATIENT)
Dept: ORTHOPEDIC SURGERY | Age: 52
End: 2022-06-10
Payer: COMMERCIAL

## 2022-06-10 DIAGNOSIS — M79.642 LEFT HAND PAIN: ICD-10-CM

## 2022-06-10 DIAGNOSIS — S60.222A CONTUSION OF LEFT HAND, INITIAL ENCOUNTER: ICD-10-CM

## 2022-06-10 DIAGNOSIS — M79.671 RIGHT FOOT PAIN: ICD-10-CM

## 2022-06-10 DIAGNOSIS — M19.071 ARTHRITIS OF FIRST METATARSOPHALANGEAL (MTP) JOINT OF RIGHT FOOT: Primary | ICD-10-CM

## 2022-06-10 PROCEDURE — 99213 OFFICE O/P EST LOW 20 MIN: CPT | Performed by: ORTHOPAEDIC SURGERY

## 2022-06-10 NOTE — LETTER
6/13/2022    Patient: Bin De Dios   YOB: 1970   Date of Visit: 6/10/2022     [address], [address] [place] [address]  V[address] In Basket    Dear Noemí Vogel MD,      Thank you for referring Mr. Bin De Dios to [facility] for evaluation. My notes for this consultation are attached. If you have questions, please do not hesitate to call me. I look forward to following your patient along with you.       Sincerely,    Sudha Buenrostro MD

## 2022-06-13 NOTE — PROGRESS NOTES
Anant Guido (: 1970) is a 45 y.o. male, patient,here for evaluation of the following   Chief Complaint   Patient presents with    Foot Injury     right    Hand Injury     left        ASSESSMENT/PLAN:  Below is the assessment and plan developed based on review of pertinent history, physical exam, labs, studies, and medications. 1. Arthritis of first metatarsophalangeal (MTP) joint of right foot  2. Contusion of left hand, initial encounter  3. Right foot pain  -     XR STANDING FOOT RT MIN 3 V; Future  4. Left hand pain  -     XR HAND LT MIN 3 V; Future    Patient informed of findings at the right foot, there is end-stage first MTP joint arthritis, the treatments are shoewear modification with rigid soled shoes, activity modification, anti-inflammatory medications when needed. Not sure if the cortisone injection will be helpful for this very severe arthritis. Surgery is elected, arthrodesis would be my recommended procedure since he has severe arthritis and more likely than not any other procedures would not last long enough prior to having to undergo the arthrodesis anyway. He should not have a lot of limitations since he is already very stiff at the great toe joint and he is already doing most activities. Once the fusion has healed, he would not have pain. Finally in regards to the left hand, he has a deep contusion, no obvious fractures are seen, the scapholunate joint and the scaphoid itself looked normal, did not see any fractures including the pisiform and hook of hamate. There is some calcification near the wrist, likely old injury as this injury is above the wrist or more distal to the wrist.  Inform if any problems that are not improving after use of a brace he already has available to use, then he should see one of my partners Dr. Lazaro Gracia or Dr. Jovi Torres. Return if symptoms worsen or fail to improve.       No Known Allergies    Current Outpatient Medications   Medication Sig    memantine ER (NAMENDA XR) 14 mg capsule TAKE 1 CAPSULE DAILY    venlafaxine-SR (EFFEXOR-XR) 150 mg capsule TAKE 1 CAPSULE DAILY    amLODIPine-benazepril (LOTREL) 5-20 mg per capsule TAKE 1 CAPSULE DAILY (NEEDS APPOINTMENT)    acetaminophen (Tylenol Extra Strength) 500 mg tablet Take 1,000 mg by mouth every six (6) hours as needed for Pain.  chlorphen/pseudoeph/ibuprofen (ADVIL ALLERGY SINUS PO) Take 1 Tablet by mouth as needed.  loperamide (IMMODIUM) 2 mg tablet Take 2 mg by mouth four (4) times daily as needed for Diarrhea.  bismuth subsalicylate (PEPTO-BISMOL) 262 mg/15 mL suspension Take 30 mL by mouth every six (6) hours as needed for Indigestion.  acetaminophen/caffeine (EXCEDRIN TENSION HEADACHE PO) Take 2 Tablets by mouth as needed.  onabotulinumtoxinA (Botox) 200 unit injection Inject selected muscles head and neck bilaterally every 12 weeks  Indications: migraine prevention    melatonin tab tablet Take 5 mg by mouth nightly.  magnesium oxide (MAG-OX) 400 mg tablet Take 1 Tab by mouth nightly.  fluticasone (FLONASE) 50 mcg/actuation nasal spray 1 Martin by Both Nostrils route two (2) times a day.  montelukast (SINGULAIR) 10 mg tablet Take 10 mg by mouth daily.  Cetirizine (ZYRTEC) 10 mg Cap Take  by mouth.  albuterol (PROVENTIL HFA, VENTOLIN HFA) 90 mcg/actuation inhaler Take 2 Puffs by inhalation every four (4) hours as needed. No current facility-administered medications for this visit.        Past Medical History:   Diagnosis Date    Adverse effect of anesthesia     SLOW TO AROUSE     Allergic rhinitis 4/8/2012    Asthma 4/8/2012    Balance disorder     Cancer ([facility] [address]     skin ca exision from scalp w/ subsequent  local numbness BCC FOREHEAD    GERD (gastroesophageal reflux disease)     h/o gerd    HX OTHER MEDICAL     rt shoulder posterior instability     Hypertension     Intractable chronic migraine without aura 2/9/2019    Dr.Kim Charles    Migraine 4/8/2012    Post concussion syndrome 2/9/2019    Psychiatric disorder     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 dislocated jaw . eval'd by ENT . Dr. Lisbeth Johnson and Dentist     Tremor, coarse 2/9/2019    Vertigo        Past Surgical History:   Procedure Laterality Date    HX BACK SURGERY  2020    Cervical C5- C6 DISC surgery     HX CHOLECYSTECTOMY      HX HEENT  march 2015    Septoplasty, sinoplasty, turbinate reduction , Dr. Prabhakar Velasquez [address]      inguinal     HX ORTHOPAEDIC  12/2018    Right Rotator Cuff Repair. Senia Narayanan MD CARTILADGE, BONE SPUR     HX OTHER SURGICAL      skin ca excision from face.      HX VASECTOMY      HX WISDOM TEETH EXTRACTION      X4 AT 22 YRS OLD        Family History   Problem Relation Age of Onset    Thyroid Disease Mother     Cancer Mother         BREAST    Diabetes Father     Stroke Father         CNS bleed     Thyroid Disease Sister     Lupus Sister     Parkinsonism Maternal Uncle     Dementia Paternal Grandfather     Heart Disease Paternal Uncle     Thyroid Disease Sister     Other Sister         REYNALDS     No Known Problems Son     No Known Problems Son     Anesth Problems Neg Hx        Social History     Socioeconomic History    Marital status:      Spouse name: Not on file    Number of children: Not on file    Years of education: Not on file    Highest education level: Not on file   Occupational History    Not on file   Tobacco Use    Smoking status: Never Smoker    Smokeless tobacco: Never Used   Vaping Use    Vaping Use: Never used   Substance and Sexual Activity    Alcohol use: Yes     Comment: 10-14 drinks per week    Drug use: No    Sexual activity: Not on file   Other Topics Concern    Not on file   Social History Narrative    Not on file     Social Determinants of Health     Financial Resource Strain:     Difficulty of Paying Living Expenses: Not on file   Food Insecurity:     Worried About Running Out of Food in the Last Year: Not on file    Giorgi of Food in the Last Year: Not on file   Transportation Needs:     Lack of Transportation (Medical): Not on file    Lack of Transportation (Non-Medical): Not on file   Physical Activity:     Days of Exercise per Week: Not on file    Minutes of Exercise per Session: Not on file   Stress:     Feeling of Stress : Not on file   Social Connections:     Frequency of Communication with Friends and Family: Not on file    Frequency of Social Gatherings with Friends and Family: Not on file    Attends [employer] Services: Not on file    Active Member of [address] [place] [address] [employer] or Organizations: Not on file    Attends Club or Organization Meetings: Not on file    Marital Status: Not on file   Intimate Partner Violence:     Fear of Current or Ex-Partner: Not on file    Emotionally Abused: Not on file    Physically Abused: Not on file    Sexually Abused: Not on file   Housing Stability:     Unable to Pay for Housing in the Last Year: Not on file    Number of [place] in the Last Year: Not on file    Unstable Housing in the Last Year: Not on file           Vitals: There were no vitals taken for this visit. There is no height or weight on file to calculate BMI. SUBJECTIVE/OBJECTIVE:  Garrett Rodriguez (: 1970)   New patient presents today with multiple complaints, right foot pain which has been a chronic problem for the past 5 years is getting worse every time he plays softball, skin to the point where he has trouble walking and swelling present. No recent injury but remote history of injury to this great toe in the past.  States the pain is moderate, sharp, dull and throbbing pain that often is there all the time but also comes and goes.   Swelling, bruising present. Standing, stairs/steps, running walking make it worse. He has tried Advil and diclofenac but no improvements. States not diabetic, has hypertension, non-smoker. Also while playing softball recently he injured his left hand, states he had hit of the ball towards him, he had a glove on put it up but the ball hit him very hard on the palmar aspect of his hand and so he has swelling there today. This is a more recent injury. He has not seen other physicians for this problem. He wanted to have this checked out to despite appointment made initially for the right foot pain which has been a chronic condition. Physical Exam  Pleasant well-nourished male , alert and oriented to person, time and place, no acute distress. Mild antalgic gait, satisfactory weightbearing stance. Right lower extremity/ankle: Calf soft nontender, ligaments stable, no swelling. Range of motion satisfactory. Right foot: There is satisfactory weightbearing stance, no severe malalignment or deformities, there is large bone spur medial and lateral, limited range of motion of about 5 degrees of range of motion arc first MTP joint, positive tenderness with mid grind test of joint. Rest of foot nontender. Contralateral foot and ankle exam, nontender, no swelling ligaments grossly stable. Normal weightbearing stance. Neurovascular exam intact for light touch sensation, cap refill, dorsalis pedis pulse palpable, flexion/extension strength 5/5. Skin intact without open wounds, lesions or ulcers, no skin discolorations, normal warmth to skin. Left hand: There is swelling over the medial palmar aspect of hand in line with the thumb area, the area of scaphoid is mostly nontender, snuffbox is negative for tenderness, no gross instability of the carpal bones, range of motion is satisfactory despite the tenderness and swelling to the palmar medial hand.   Able to do range of motion okay sign, flexion/extension, satisfactory  strength. Imaging:    XR Results (maximum last 2): Results from Appointment encounter on 06/10/22    XR STANDING FOOT RT MIN 3 V    Narrative  Right foot AP, lateral and oblique standing x-rays show severe first MTP joint arthritis with joint space narrowing, bone spurs medial and dorsal, end-stage arthritis hallux rigidus grade 4. No acute fractures or dislocations. XR HAND LT MIN 3 V    Narrative  Left hand AP, lateral and oblique x-rays show no obvious fractures or dislocation, lateral view the carpal bones appear intact, the lunate is in appropriate position to the wrist, the scapholunate joint appears intact without widening. The pisiform, hook of hamate appear intact without fractures. Distal to the injury, there is calcification near his wrist, this does not appear acute. There is palmar soft tissue swelling. There is some early signs of first CMC arthritis. An electronic signature was used to authenticate this note.   -- Aleene Apgar, MD

## 2022-09-02 DIAGNOSIS — M79.671 RIGHT FOOT PAIN: ICD-10-CM

## 2022-09-02 DIAGNOSIS — M19.071 ARTHRITIS OF FIRST METATARSOPHALANGEAL (MTP) JOINT OF RIGHT FOOT: Primary | ICD-10-CM

## 2022-09-08 ENCOUNTER — OFFICE VISIT (OUTPATIENT)
Dept: NEUROLOGY | Age: 52
End: 2022-09-08
Payer: COMMERCIAL

## 2022-09-08 VITALS
OXYGEN SATURATION: 98 % | HEART RATE: 70 BPM | DIASTOLIC BLOOD PRESSURE: 72 MMHG | RESPIRATION RATE: 16 BRPM | SYSTOLIC BLOOD PRESSURE: 124 MMHG | HEIGHT: 72 IN | WEIGHT: 235 LBS | BODY MASS INDEX: 31.83 KG/M2

## 2022-09-08 DIAGNOSIS — G43.719 INTRACTABLE CHRONIC MIGRAINE WITHOUT AURA AND WITHOUT STATUS MIGRAINOSUS: Primary | ICD-10-CM

## 2022-09-08 PROCEDURE — 64615 CHEMODENERV MUSC MIGRAINE: CPT | Performed by: PSYCHIATRY & NEUROLOGY

## 2022-10-19 NOTE — TELEPHONE ENCOUNTER
Pt wants you to know that he has been sleeping 10 or more hours a night. He states this is unusual for him. Also, do you need to see him for a f/u before his next  botox appt?
Spoke with pt.  He is aware
normal (ped)...

## 2022-11-18 ENCOUNTER — OFFICE VISIT (OUTPATIENT)
Dept: ORTHOPEDIC SURGERY | Age: 52
End: 2022-11-18
Payer: COMMERCIAL

## 2022-11-18 VITALS — HEIGHT: 72 IN | WEIGHT: 235 LBS | BODY MASS INDEX: 31.83 KG/M2

## 2022-11-18 DIAGNOSIS — M19.071 ARTHRITIS OF FIRST METATARSOPHALANGEAL (MTP) JOINT OF RIGHT FOOT: Primary | ICD-10-CM

## 2022-11-18 DIAGNOSIS — M79.671 RIGHT FOOT PAIN: ICD-10-CM

## 2022-11-18 PROCEDURE — 99213 OFFICE O/P EST LOW 20 MIN: CPT | Performed by: ORTHOPAEDIC SURGERY

## 2022-11-18 NOTE — LETTER
11/20/2022    Patient: Nehal Henry   YOB: 1970   Date of Visit: 11/18/2022     [address], [address] [place] [address]  Via In Basket    Dear Stevo Sterling MD,      Thank you for referring Mr. Nehal Henry to [facility] for evaluation. My notes for this consultation are attached. If you have questions, please do not hesitate to call me. I look forward to following your patient along with you.       Sincerely,    Dino Canales MD

## 2022-11-20 NOTE — PROGRESS NOTES
Ramírez Wisdom (: 1970) is a 47 y.o. male, patient,here for evaluation of the following   Chief Complaint   Patient presents with    Follow-up    Foot Pain        ASSESSMENT/PLAN:  Below is the assessment and plan developed based on review of pertinent history, physical exam, labs, studies, and medications. 1. Arthritis of first metatarsophalangeal (MTP) joint of right foot  2. Right foot pain    Patient is here today for final discussion length discussion about surgical plan, he is tentatively scheduled for Monday, 2022. Surgical procedure plan is right foot first MTP joint arthrodesis, for end-stage hallux rigidus. He has failed conservative treatment has had problems for more than 5 years now and the pain has not subsided despite conservative treatment, activity modification included. The patient elects to proceed with surgical plan as recommended which is arthrodesis. Today we further discussed the treatment risk, potential complications, expected outcomes, postoperative limitation time needed for recovery. All questions were answered, no guarantees are made. Informed consent will be obtained prior to surgery. Surgical procedure is planned at [address] [place] [address] outpatient facility on 2022. When he returns postop, we will obtain x-rays of the right foot 3 views nonweightbearing. Return for post surgical follow up, repeat xrays. No Known Allergies    Current Outpatient Medications   Medication Sig    memantine ER (NAMENDA XR) 14 mg capsule TAKE 1 CAPSULE DAILY    venlafaxine-SR (EFFEXOR-XR) 150 mg capsule TAKE 1 CAPSULE DAILY    amLODIPine-benazepril (LOTREL) 5-20 mg per capsule TAKE 1 CAPSULE DAILY (NEEDS APPOINTMENT)    acetaminophen (TYLENOL) 500 mg tablet Take 1,000 mg by mouth every six (6) hours as needed for Pain. chlorphen/pseudoeph/ibuprofen (ADVIL ALLERGY SINUS PO) Take 1 Tablet by mouth as needed.     loperamide (IMMODIUM) 2 mg tablet Take 2 mg by mouth four (4) times daily as needed for Diarrhea. bismuth subsalicylate (PEPTO-BISMOL) 262 mg/15 mL suspension Take 30 mL by mouth every six (6) hours as needed for Indigestion. acetaminophen/caffeine (EXCEDRIN TENSION HEADACHE PO) Take 2 Tablets by mouth as needed. onabotulinumtoxinA (Botox) 200 unit injection Inject selected muscles head and neck bilaterally every 12 weeks  Indications: migraine prevention    melatonin tab tablet Take 5 mg by mouth nightly.    magnesium oxide (MAG-OX) 400 mg tablet Take 1 Tab by mouth nightly. fluticasone (FLONASE) 50 mcg/actuation nasal spray 1 [place] by Both Nostrils route two (2) times a day. montelukast (SINGULAIR) 10 mg tablet Take 10 mg by mouth daily. Cetirizine 10 mg cap Take  by mouth. albuterol (PROVENTIL HFA, VENTOLIN HFA) 90 mcg/actuation inhaler Take 2 Puffs by inhalation every four (4) hours as needed. No current facility-administered medications for this visit. Past Medical History:   Diagnosis Date    Adverse effect of anesthesia     SLOW TO AROUSE     Allergic rhinitis 4/8/2012    Asthma 4/8/2012    Balance disorder     Cancer ([facility] [address]     skin ca exision from scalp w/ subsequent  local numbness BCC FOREHEAD    GERD (gastroesophageal reflux disease)     h/o gerd    HX OTHER MEDICAL     rt shoulder posterior instability     Hypertension     Intractable chronic migraine without aura 2/9/2019    Dr.Kim Charles    Migraine 4/8/2012    Post concussion syndrome 2/9/2019    Psychiatric disorder     DEPRESSION DUE TO CONCUSSIONS     Raynaud's disease     Right elbow tendinitis     Sciatica     Sinus headache 4/8/2012    Tension headache 4/8/2012    Trauma     nasal fracture, 3 concussions, soft ball eye injury     Trauma 06/2016    concussion . dislocated jaw . eval'd by ENT .  Dr. Ajit Singletary and Dentist     Tremor, coarse 2/9/2019    Vertigo        Past Surgical History:   Procedure Laterality Date    HX BACK SURGERY  2020    Cervical C5- C6 DISC surgery HX CHOLECYSTECTOMY      HX HEENT  2015    Septoplasty, sinoplasty, turbinate reduction , Dr. Wolfgang Oden      inguinal     HX ORTHOPAEDIC  2018    Right Rotator Cuff Repair. Bharath Rodriguez MD CARTILADGE, BONE SPUR     HX OTHER SURGICAL      skin ca excision from face. HX VASECTOMY      HX WISDOM TEETH EXTRACTION      X4 AT 22 YRS OLD        Family History   Problem Relation Age of Onset    Thyroid Disease Mother     Cancer Mother         BREAST    Diabetes Father     Stroke Father         CNS bleed     Thyroid Disease Sister     Lupus Sister     Parkinsonism Maternal Uncle     Dementia Paternal Grandfather     Heart Disease Paternal Uncle     Thyroid Disease Sister     Other Sister         REYNALDS     No Known Problems Son     No Known Problems Son     Anesth Problems Neg Hx        Social History     Socioeconomic History    Marital status:      Spouse name: Not on file    Number of children: Not on file    Years of education: Not on file    Highest education level: Not on file   Occupational History    Not on file   Tobacco Use    Smoking status: Never    Smokeless tobacco: Never   Vaping Use    Vaping Use: Never used   Substance and Sexual Activity    Alcohol use: Yes     Comment: 10-14 drinks per week    Drug use: No    Sexual activity: Not on file   Other Topics Concern    Not on file   Social History Narrative    Not on file     Social Determinants of Health     Financial Resource Strain: Not on file   Food Insecurity: Not on file   Transportation Needs: Not on file   Physical Activity: Not on file   Stress: Not on file   Social Connections: Not on file   Intimate Partner Violence: Not on file   Housing Stability: Not on file           Vitals:  Ht 6' (1.829 m)   Wt 235 lb (106.6 kg)   BMI 31.87 kg/m²    Body mass index is 31.87 kg/m².         SUBJECTIVE:  Genia Khan (: 1970)   Patient back today for reevaluation and final evaluation prior to surgical procedure planned upcoming on Monday, November 28, 2022. Patient has significant pain to the right foot which has failed conservative treatment and this is related to first MTP joint arthritis. Last seen in June 2022, he had persistent pain of a chronic problem ongoing for over 5 years and was getting worse when playing softball. He was informed of treatment options for end stage first MTP joint arthritis which is arthrodesis, and he elects to proceed with that surgical procedure. He is not diabetic, non-smoker. OBJECTIVE EXAM:     Visit Vitals  Ht 6' (1.829 m)   Wt 235 lb (106.6 kg)   BMI 31.87 kg/m²       Appearance: Alert, well appearing and pleasant patient who is in no distress, oriented to person, place/time, and who follows commands. This patient is accompanied in the       office by his  self. Psychiatric: Affect and mood are appropriate. No dementia noted on examination  Musculoskeletal:  LOCATION: Diffuse tenderness and swelling first MTP joint foot - right      Integumentary: No rashes, skin patches, wounds, or abrasions to the right or left legs       Warm and normal color. No regions of expressible drainage. Gait: Normal      Tenderness: No tenderness        Motor/Strength/Tone Exam: Normal       Sensory Exam:   Intact Normal Sensation to ankle/foot      Stability Testing: No anterolateral or varus instability of the Ankle or Subtalar Joints               No peroneal tendon instability noted      ROM: Normal ROM noted to ankle/foot      Contractures: No Achilles or Gastrocnemius Contractures      Calf tenderness: Absent for calf or gastrocnemius muscle regions       Soft, supple, non tender, non taut lower extremity compartment  Alignment:      NEUTRAL Hindfoot,         none Metatarsus Adductus Metatarsus   Wounds/Abrasions:    None present  Extremities:   No embolic phenomena to the toes          No significant edema to the foot and or toes.         Lower extremities are warm and appear well perfused    DVT: No evidence of DVT seen on examination at this time     No calf swelling, no tenderness to calf muscles  Lymphatic:  No Evidence of Lymphedema  Vascular: Medial Border of Tibia Region: Edema is not present         Pulses: Dorsalis Pedis &  Posterior Tibial Pulses : Palpable yes        Varicosities Lower Limbs :  None  noted  Neuro: Negative bilateral Straight leg raise (seated position)    See Musculoskeletal section for pertinent individual extremity examination    No abnormal hand/wrist, foot/ankle, or facial/neck tremors. Lower Extremity/Ankle/Foot:  Mild antalgic gait, satisfactory weightbearing stance. Right lower extremity/ankle: Calf soft nontender, ligaments stable, no swelling. Range of motion satisfactory. Right foot: There is satisfactory weightbearing stance, no severe malalignment or deformities, there is large bone spur medial and lateral, limited range of motion of about 5 degrees of range of motion arc first MTP joint, positive tenderness with mid grind test of joint. Rest of foot nontender. Contralateral lower extremity/ankle /foot exam:  Nontender, no swelling ligaments grossly stable. Normal weightbearing stance. Neurovascular exam intact for light touch sensation, cap refill, dorsalis pedis pulse palpable, flexion/extension strength 5/5. Imaging:    No new x-rays were obtained today, previous x-rays confirm end-stage first MTP joint arthritis/hallux rigidus with bone spurs, joint space narrowing, loose bodies. XR Results (most recent):  Results from Appointment encounter on 06/10/22    XR STANDING FOOT RT MIN 3 V    Narrative  Right foot AP, lateral and oblique standing x-rays show severe first MTP joint arthritis with joint space narrowing, bone spurs medial and dorsal, end-stage arthritis hallux rigidus grade 4. No acute fractures or dislocations. An electronic signature was used to authenticate this note.   -- Madelin De León MD

## 2022-11-28 DIAGNOSIS — G89.18 ACUTE POST-OPERATIVE PAIN: Primary | ICD-10-CM

## 2022-11-28 RX ORDER — OXYCODONE HYDROCHLORIDE 5 MG/1
5 TABLET ORAL
Qty: 20 TABLET | Refills: 0 | Status: SHIPPED | OUTPATIENT
Start: 2022-11-28 | End: 2022-12-01

## 2022-11-28 NOTE — PROGRESS NOTES
Post op medications: Oxycodone 5 mg 1 tab po q4-6 hours prn pain, #20 tabs sent to pharmacy [facility] on [address] in [place], [place]. DOS 11/28/22 at [facility].

## 2022-12-08 ENCOUNTER — OFFICE VISIT (OUTPATIENT)
Dept: NEUROLOGY | Age: 52
End: 2022-12-08
Payer: COMMERCIAL

## 2022-12-08 VITALS
HEART RATE: 98 BPM | HEIGHT: 72 IN | BODY MASS INDEX: 31.87 KG/M2 | SYSTOLIC BLOOD PRESSURE: 158 MMHG | DIASTOLIC BLOOD PRESSURE: 92 MMHG | OXYGEN SATURATION: 99 %

## 2022-12-08 DIAGNOSIS — G43.719 INTRACTABLE CHRONIC MIGRAINE WITHOUT AURA AND WITHOUT STATUS MIGRAINOSUS: Primary | ICD-10-CM

## 2022-12-08 NOTE — PROGRESS NOTES
Botox Injection Note     2022     Patient:  Mckenzie Grigsby   YOB: 1970  Date of Visit: 2022      Indication: patient has chronic migraine headaches greater than 15 days per month lasting more than 4 hours each. He has failed or not tolerated 2 or more medication preventatives. Written consent was signed and verified by me. Risks and benefits were discussed to include possible cosmetic asymmetry which is not permament or life threatening. Patient name and  was confirmed prior to procedure. Time out performed. Procedure:   Botox concentration: 200 units in 2 ml of preservative-free normal saline. 1:1 dilution. LOT#: [id number]  EXP:  2025    Injections and distribution as follow :      Units/site  Sites Loc Subtotal    procerus 5 1 ML 5   corrugaters 2.5 2 BL 5   frontalis 5 8 BL 40   Temporalis 10 4 BL 40   Occipitalis 10 2 BL 20   Cervical paraspinals 5 4 BL 20   Trapezius 10 4 BL 40         200 units Botox were reconstituted, 170 units injected as above and the remainder was unavoidably wasted. Patient tolerated procedure well. Return in 3 months for repeat injections.     _____________________________   Luis M Langston DO  [address], ABPN

## 2022-12-16 ENCOUNTER — OFFICE VISIT (OUTPATIENT)
Dept: ORTHOPEDIC SURGERY | Age: 52
End: 2022-12-16
Payer: COMMERCIAL

## 2022-12-16 VITALS — WEIGHT: 235 LBS | BODY MASS INDEX: 31.83 KG/M2 | HEIGHT: 72 IN

## 2022-12-16 DIAGNOSIS — Z98.890 STATUS POST RIGHT FOOT SURGERY: Primary | ICD-10-CM

## 2022-12-16 DIAGNOSIS — M19.071 ARTHRITIS OF FIRST METATARSOPHALANGEAL (MTP) JOINT OF RIGHT FOOT: ICD-10-CM

## 2022-12-16 PROCEDURE — 99024 POSTOP FOLLOW-UP VISIT: CPT | Performed by: ORTHOPAEDIC SURGERY

## 2022-12-16 NOTE — PROGRESS NOTES
Khai Gallegos (: 1970) is a 47 y.o. male, patient,here for evaluation of the following   Chief Complaint   Patient presents with    Surgical Follow-up        ASSESSMENT/PLAN:  Below is the assessment and plan developed based on review of pertinent history, physical exam, labs, studies, and medications. 1. Status post right foot surgery  -     REFERRAL TO DME; Future  2. Arthritis of first metatarsophalangeal (MTP) joint of right foot    Patient is doing well at about 2 weeks postop, his incision was checked and looks good, sutures were removed and Steri-Strips were applied. He will remain nonweightbearing using knee walker, crutches or walker. New dressings are applied to the right foot after this sutures removed and Steri-Strips applied, he is also fitted with a short boot today but will remain nonweightbearing in the boot. He does not need to sleep in the boot, and if he is sitting around at home and not doing much activities, he can take the boot off. He will continue DVT prophylaxis with aspirin especially if he plans to travel which she states he is. Next time I see him on 2022, we will get new x-rays of the right foot 3 views nonweightbearing. Patient informed to return sooner if any problems arise. Return in about 3 weeks (around 2023) for repeat xrays. No Known Allergies    Current Outpatient Medications   Medication Sig    memantine ER (NAMENDA XR) 14 mg capsule TAKE 1 CAPSULE DAILY    venlafaxine-SR (EFFEXOR-XR) 150 mg capsule TAKE 1 CAPSULE DAILY    amLODIPine-benazepril (LOTREL) 5-20 mg per capsule TAKE 1 CAPSULE DAILY (NEEDS APPOINTMENT)    acetaminophen (TYLENOL) 500 mg tablet Take 1,000 mg by mouth every six (6) hours as needed for Pain. chlorphen/pseudoeph/ibuprofen (ADVIL ALLERGY SINUS PO) Take 1 Tablet by mouth as needed. loperamide (IMMODIUM) 2 mg tablet Take 2 mg by mouth four (4) times daily as needed for Diarrhea.     bismuth subsalicylate (PEPTO-BISMOL) 262 mg/15 mL suspension Take 30 mL by mouth every six (6) hours as needed for Indigestion. acetaminophen/caffeine (EXCEDRIN TENSION HEADACHE PO) Take 2 Tablets by mouth as needed. onabotulinumtoxinA (Botox) 200 unit injection Inject selected muscles head and neck bilaterally every 12 weeks  Indications: migraine prevention    melatonin tab tablet Take 5 mg by mouth nightly.    magnesium oxide (MAG-OX) 400 mg tablet Take 1 Tab by mouth nightly. fluticasone (FLONASE) 50 mcg/actuation nasal spray 1 [place] by Both Nostrils route two (2) times a day. montelukast (SINGULAIR) 10 mg tablet Take 10 mg by mouth daily. Cetirizine 10 mg cap Take  by mouth. albuterol (PROVENTIL HFA, VENTOLIN HFA) 90 mcg/actuation inhaler Take 2 Puffs by inhalation every four (4) hours as needed. No current facility-administered medications for this visit. 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 dislocated jaw . eval'd by ENT .  Dr. Fouzia Alicea and Dentist     Tremor, coarse 2/9/2019    Vertigo        Past Surgical History:   Procedure Laterality Date    HX BACK SURGERY  2020    Cervical C5- C6 DISC surgery     HX CHOLECYSTECTOMY      HX HEENT  march 2015    Septoplasty, sinoplasty, turbinate reduction , Dr. Lianna Leonard      inguinal     HX ORTHOPAEDIC  2018    Right Rotator Cuff Repair. Donny Crockett MD CARTILADGE, BONE SPUR     HX OTHER SURGICAL      skin ca excision from face. HX VASECTOMY      HX WISDOM TEETH EXTRACTION      X4 AT 22 YRS OLD        Family History   Problem Relation Age of Onset    Thyroid Disease Mother     Cancer Mother         BREAST    Diabetes Father     Stroke Father         CNS bleed     Thyroid Disease Sister     Lupus Sister     Parkinsonism Maternal Uncle     Dementia Paternal Grandfather     Heart Disease Paternal Uncle     Thyroid Disease Sister     Other Sister         REYNALDS     No Known Problems Son     No Known Problems Son     Anesth Problems Neg Hx        Social History     Socioeconomic History    Marital status:      Spouse name: Not on file    Number of children: Not on file    Years of education: Not on file    Highest education level: Not on file   Occupational History    Not on file   Tobacco Use    Smoking status: Never    Smokeless tobacco: Never   Vaping Use    Vaping Use: Never used   Substance and Sexual Activity    Alcohol use: Yes     Comment: 10-14 drinks per week    Drug use: No    Sexual activity: Not on file   Other Topics Concern    Not on file   Social History Narrative    Not on file     Social Determinants of Health     Financial Resource Strain: Not on file   Food Insecurity: Not on file   Transportation Needs: Not on file   Physical Activity: Not on file   Stress: Not on file   Social Connections: Not on file   Intimate Partner Violence: Not on file   Housing Stability: Not on file           Vitals:  Ht 6' (1.829 m)   Wt 235 lb (106.6 kg)   BMI 31.87 kg/m²    Body mass index is 31.87 kg/m². SUBJECTIVE:  Geremias Thrasher (: 1970)   Patient is back today for reevaluation status post arthrodesis of right first MTP joint for arthritis.   His surgical date was on 2022 so about 2 weeks since date of surgery. He has no complaints. He has been taking aspirin per day for DVT prophylaxis. OBJECTIVE EXAM:     Visit Vitals  Ht 6' (1.829 m)   Wt 235 lb (106.6 kg)   BMI 31.87 kg/m²       Appearance: Alert, well appearing and pleasant patient who is in no distress, oriented to person, place/time, and who follows commands. This patient is accompanied in the       office by his  self. Psychiatric: Affect and mood are appropriate. No dementia noted on examination  Musculoskeletal:  LOCATION: There is minimal tenderness or swelling great toe foot - right      Integumentary: No rashes, skin patches, wounds, or abrasions to the right or left legs       Warm and normal color. No regions of expressible drainage. Gait: Normal      Tenderness: No tenderness        Motor/Strength/Tone Exam: Normal       Sensory Exam:   Intact Normal Sensation to ankle/foot      Stability Testing: No anterolateral or varus instability of the Ankle or Subtalar Joints               No peroneal tendon instability noted      ROM: Normal ROM noted to ankle/foot      Contractures: No Achilles or Gastrocnemius Contractures      Calf tenderness: Absent for calf or gastrocnemius muscle regions       Soft, supple, non tender, non taut lower extremity compartment  Alignment:      NEUTRAL Hindfoot,         none Metatarsus Adductus Metatarsus   Wounds/Abrasions:    None present  Extremities:   No embolic phenomena to the toes          No significant edema to the foot and or toes.         Lower extremities are warm and appear well perfused    DVT: No evidence of DVT seen on examination at this time     No calf swelling, no tenderness to calf muscles  Lymphatic:  No Evidence of Lymphedema  Vascular: Medial Border of Tibia Region: Edema is not present         Pulses: Dorsalis Pedis &  Posterior Tibial Pulses : Palpable yes        Varicosities Lower Limbs :  None  noted  Neuro: Negative bilateral Straight leg raise (seated position)    See Musculoskeletal section for pertinent individual extremity examination    No abnormal hand/wrist, foot/ankle, or facial/neck tremors. Lower Extremity/Ankle/Foot:  Nonweightbearing gait, limited weightbearing stance. Right lower extremity/ankle: Full active and passive range of motion intact, nontender, no swelling, ligament stable. Normal exam.  Calf soft nontender. Right foot: Incision site is healing properly, no signs of infection, sutures are ready for removal.  There is minimal tenderness to palpation but there is moderate swelling. Light touch sensation capillary refill intact there is excellent alignment to the right foot. Contralateral lower extremity/ankle /foot exam:  Nontender, no swelling ligaments grossly stable. Normal weightbearing stance. Neurovascular exam is grossly intact. Imaging:    No x-rays were obtained today. An electronic signature was used to authenticate this note.   -- Edna Heaton MD

## 2022-12-16 NOTE — LETTER
12/16/2022    Patient: Davonte Paez   YOB: 1970   Date of Visit: 12/16/2022     [address], [address] [place] [address]  Via In Basket    Dear Pearl Araya MD,      Thank you for referring Mr. Davonte Paez to [facility] for evaluation. My notes for this consultation are attached. If you have questions, please do not hesitate to call me. I look forward to following your patient along with you.       Sincerely,    Sanjuana Rea MD

## 2023-01-06 ENCOUNTER — OFFICE VISIT (OUTPATIENT)
Dept: ORTHOPEDIC SURGERY | Age: 53
End: 2023-01-06
Payer: COMMERCIAL

## 2023-01-06 VITALS — BODY MASS INDEX: 31.83 KG/M2 | HEIGHT: 72 IN | WEIGHT: 235 LBS

## 2023-01-06 DIAGNOSIS — M19.071 ARTHRITIS OF FIRST METATARSOPHALANGEAL (MTP) JOINT OF RIGHT FOOT: ICD-10-CM

## 2023-01-06 DIAGNOSIS — Z98.890 STATUS POST RIGHT FOOT SURGERY: Primary | ICD-10-CM

## 2023-01-06 NOTE — LETTER
1/15/2023    Patient: Jennifer Alvarenga   YOB: 1970   Date of Visit: 1/6/2023     [address], [address] [place] [address]  V[address] In Basket    Dear Jayshree Lin MD,      Thank you for referring Mr. Jennifer Alvarenga to [facility] for evaluation. My notes for this consultation are attached. If you have questions, please do not hesitate to call me. I look forward to following your patient along with you.       Sincerely,    Sher Moreno MD

## 2023-01-06 NOTE — PROGRESS NOTES
Yovani Schofield (: 1970) is a 47 y.o. male, patient,here for evaluation of the following   Chief Complaint   Patient presents with    Follow-up        ASSESSMENT/PLAN:  Below is the assessment and plan developed based on review of pertinent history, physical exam, labs, studies, and medications. 1. Status post right foot surgery  -     XR FOOT RT MIN 3 V; Future  -     REFERRAL TO PHYSICAL THERAPY  2. Arthritis of first metatarsophalangeal (MTP) joint of right foot  -     XR FOOT RT MIN 3 V; Future  -     REFERRAL TO PHYSICAL THERAPY    Patient verbalized understanding of exam findings and treatment plan. We engaged in the shared decision-making process and treatment options were discussed at length with the patient. Surgical and conservative management discussed today along with risk and benefits. Patient is doing very well, the right foot first MTP joint arthrodesis is healing properly and implants are intact. We will progress to physical therapy program and he will start weightbearing as tolerated in approxi-2 weeks using the postop shoe and boot. He is provided the referral to physical therapist.  Next time he returns we will get new x-rays of the right foot 3 views nonweightbearing. Return in about 4 weeks (around 2/3/2023) for repeat xrays. No Known Allergies    Current Outpatient Medications   Medication Sig    memantine ER (NAMENDA XR) 14 mg capsule TAKE 1 CAPSULE DAILY    venlafaxine-SR (EFFEXOR-XR) 150 mg capsule TAKE 1 CAPSULE DAILY    amLODIPine-benazepril (LOTREL) 5-20 mg per capsule TAKE 1 CAPSULE DAILY (NEEDS APPOINTMENT)    acetaminophen (TYLENOL) 500 mg tablet Take 1,000 mg by mouth every six (6) hours as needed for Pain. chlorphen/pseudoeph/ibuprofen (ADVIL ALLERGY SINUS PO) Take 1 Tablet by mouth as needed. loperamide (IMMODIUM) 2 mg tablet Take 2 mg by mouth four (4) times daily as needed for Diarrhea.     bismuth subsalicylate (PEPTO-BISMOL) 262 mg/15 mL suspension Take 30 mL by mouth every six (6) hours as needed for Indigestion. acetaminophen/caffeine (EXCEDRIN TENSION HEADACHE PO) Take 2 Tablets by mouth as needed. onabotulinumtoxinA (Botox) 200 unit injection Inject selected muscles head and neck bilaterally every 12 weeks  Indications: migraine prevention    melatonin tab tablet Take 5 mg by mouth nightly.    magnesium oxide (MAG-OX) 400 mg tablet Take 1 Tab by mouth nightly. fluticasone (FLONASE) 50 mcg/actuation nasal spray 1 [place] by Both Nostrils route two (2) times a day. montelukast (SINGULAIR) 10 mg tablet Take 10 mg by mouth daily. Cetirizine 10 mg cap Take  by mouth. albuterol (PROVENTIL HFA, VENTOLIN HFA) 90 mcg/actuation inhaler Take 2 Puffs by inhalation every four (4) hours as needed. No current facility-administered medications for this visit. Past Medical History:   Diagnosis Date    Adverse effect of anesthesia     SLOW TO AROUSE     Allergic rhinitis 4/8/2012    Asthma 4/8/2012    Balance disorder     Cancer ([facility] [address]     skin ca exision from scalp w/ subsequent  local numbness BCC FOREHEAD    GERD (gastroesophageal reflux disease)     h/o gerd    HX OTHER MEDICAL     rt shoulder posterior instability     Hypertension     Intractable chronic migraine without aura 2/9/2019    Dr.Kim Charles    Migraine 4/8/2012    Post concussion syndrome 2/9/2019    Psychiatric disorder     DEPRESSION DUE TO CONCUSSIONS     Raynaud's disease     Right elbow tendinitis     Sciatica     Sinus headache 4/8/2012    Tension headache 4/8/2012    Trauma     nasal fracture, 3 concussions, soft ball eye injury     Trauma 06/2016    concussion . dislocated jaw . eval'd by ENT .  Dr. Meera Stewart and Dentist     Kirsten, coarse 2/9/2019    Vertigo        Past Surgical History:   Procedure Laterality Date    HX BACK SURGERY  2020    Cervical C5- C6 DISC surgery     HX CHOLECYSTECTOMY      HX HEENT  march 2015    Septoplasty, sinoplasty, turbinate reduction ,  Finn     HX HERNIA REPAIR      inguinal     HX ORTHOPAEDIC  2018    Right Rotator Cuff Repair. Brie Bolivar MD CARTILADGE, BONE SPUR     HX OTHER SURGICAL      skin ca excision from face. HX VASECTOMY      HX WISDOM TEETH EXTRACTION      X4 AT 22 YRS OLD        Family History   Problem Relation Age of Onset    Thyroid Disease Mother     Cancer Mother         BREAST    Diabetes Father     Stroke Father         CNS bleed     Thyroid Disease Sister     Lupus Sister     Parkinsonism Maternal Uncle     Dementia Paternal Grandfather     Heart Disease Paternal Uncle     Thyroid Disease Sister     Other Sister         REYNALDS     No Known Problems Son     No Known Problems Son     Anesth Problems Neg Hx        Social History     Socioeconomic History    Marital status:      Spouse name: Not on file    Number of children: Not on file    Years of education: Not on file    Highest education level: Not on file   Occupational History    Not on file   Tobacco Use    Smoking status: Never    Smokeless tobacco: Never   Vaping Use    Vaping Use: Never used   Substance and Sexual Activity    Alcohol use: Yes     Comment: 10-14 drinks per week    Drug use: No    Sexual activity: Not on file   Other Topics Concern    Not on file   Social History Narrative    Not on file     Social Determinants of Health     Financial Resource Strain: Not on file   Food Insecurity: Not on file   Transportation Needs: Not on file   Physical Activity: Not on file   Stress: Not on file   Social Connections: Not on file   Intimate Partner Violence: Not on file   Housing Stability: Not on file           Vitals:  Ht 6' (1.829 m)   Wt 235 lb (106.6 kg)   BMI 31.87 kg/m²    Body mass index is 31.87 kg/m². SUBJECTIVE:  Carlos Matute (: 1970)   Patient is back today for reevaluation status post procedure of right first MTP joint arthrodesis on 2022. He is doing well approximately 6 weeks out since date of surgery. He has no other complaints today. OBJECTIVE EXAM:     Visit Vitals  Ht 6' (1.829 m)   Wt 235 lb (106.6 kg)   BMI 31.87 kg/m²       Appearance: Alert, well appearing and pleasant patient who is in no distress, oriented to person, place/time, and who follows commands. This patient is accompanied in the       office by his  self. Psychiatric: Affect and mood are appropriate. No dementia noted on examination  Musculoskeletal:  LOCATION: Minimal tenderness, mild swelling great toe area foot - right      Integumentary: No rashes, skin patches, wounds, or abrasions to the right or left legs       Warm and normal color. No regions of expressible drainage. Gait: Normal      Tenderness: No tenderness        Motor/Strength/Tone Exam: Normal       Sensory Exam:   Intact Normal Sensation to ankle/foot      Stability Testing: No anterolateral or varus instability of the Ankle or Subtalar Joints               No peroneal tendon instability noted      ROM: Normal ROM noted to ankle/foot      Contractures: No Achilles or Gastrocnemius Contractures      Calf tenderness: Absent for calf or gastrocnemius muscle regions       Soft, supple, non tender, non taut lower extremity compartment  Alignment:      NEUTRAL Hindfoot,         none Metatarsus Adductus Metatarsus   Wounds/Abrasions:    None present  Extremities:   No embolic phenomena to the toes          No significant edema to the foot and or toes.         Lower extremities are warm and appear well perfused    DVT: No evidence of DVT seen on examination at this time     No calf swelling, no tenderness to calf muscles  Lymphatic:  No Evidence of Lymphedema  Vascular: Medial Border of Tibia Region: Edema is not present         Pulses: Dorsalis Pedis &  Posterior Tibial Pulses : Palpable yes        Varicosities Lower Limbs :  None  noted  Neuro: Negative bilateral Straight leg raise (seated position)    See Musculoskeletal section for pertinent individual extremity examination    No abnormal hand/wrist, foot/ankle, or facial/neck tremors. Lower Extremity/Ankle/Foot:  Mostly nonweightbearing gait, limited weightbearing stance. Right lower extremity/ankle: Full active and passive range of motion intact, nontender, swelling, ligament stable. Calf soft nontender. Right foot: First MTP joint incision healing well without signs of infection, there is less swelling and tenderness to palpation, overall alignment is excellent. Contralateral lower extremity/ankle /foot exam:  Nontender, no swelling ligaments grossly stable. Normal weightbearing stance. Neurovascular exam grossly intact. Imaging:    XR Results (most recent):  Results from Appointment encounter on 01/06/23    XR FOOT RT MIN 3 V    Narrative  Right foot AP, lateral and oblique nonweightbearing x-rays show the first MTP joint is healing well, the implant is intact and well aligned. There is no significant deformity seen. The MTP joint is almost completely fused. No loosening of implants. An electronic signature was used to authenticate this note.   -- Liz Marquez MD

## 2023-01-23 ENCOUNTER — OFFICE VISIT (OUTPATIENT)
Dept: NEUROLOGY | Age: 53
End: 2023-01-23
Payer: COMMERCIAL

## 2023-01-23 VITALS
SYSTOLIC BLOOD PRESSURE: 132 MMHG | RESPIRATION RATE: 7 BRPM | HEIGHT: 72 IN | DIASTOLIC BLOOD PRESSURE: 71 MMHG | OXYGEN SATURATION: 98 % | HEART RATE: 89 BPM | BODY MASS INDEX: 33.32 KG/M2 | WEIGHT: 246 LBS

## 2023-01-23 DIAGNOSIS — Z87.820 HISTORY OF TRAUMATIC BRAIN INJURY: ICD-10-CM

## 2023-01-23 DIAGNOSIS — G43.719 INTRACTABLE CHRONIC MIGRAINE WITHOUT AURA AND WITHOUT STATUS MIGRAINOSUS: Primary | ICD-10-CM

## 2023-01-23 PROCEDURE — 99214 OFFICE O/P EST MOD 30 MIN: CPT | Performed by: PSYCHIATRY & NEUROLOGY

## 2023-01-23 PROCEDURE — 3078F DIAST BP <80 MM HG: CPT | Performed by: PSYCHIATRY & NEUROLOGY

## 2023-01-23 PROCEDURE — 3075F SYST BP GE 130 - 139MM HG: CPT | Performed by: PSYCHIATRY & NEUROLOGY

## 2023-01-23 RX ORDER — VENLAFAXINE HYDROCHLORIDE 150 MG/1
150 CAPSULE, EXTENDED RELEASE ORAL DAILY
Qty: 90 CAPSULE | Refills: 3 | Status: SHIPPED | OUTPATIENT
Start: 2023-01-23

## 2023-01-23 RX ORDER — MEMANTINE HYDROCHLORIDE 14 MG/1
CAPSULE, EXTENDED RELEASE ORAL
Qty: 90 CAPSULE | Refills: 3 | Status: SHIPPED | OUTPATIENT
Start: 2023-01-23

## 2023-01-23 NOTE — PROGRESS NOTES
Chief Complaint   Patient presents with    Migraine       HPI        Margie Pace is a 65-year-old gentleman following up. I have seen him several years now for various issues to include history of traumatic brain injury followed by subsequent chronic migraine, tremor, anxiety. He receives Botox injections for control of migraine with good results. Less than 7 severe breakthrough while Botox is effective and no ER visits. His last Botox injection was December 2022. 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 Possible job changes in the future. Recovering from foot surgery and has not been able to do his usual physical activity but hopes to resume sports this spring. Memory has always been an issue. He had neuropsych testing done recently by an outside source in [place] as part of a TBI study and it was normal showing only some anxiety. He had imaging done as well consisting of an MRI of the brain last year done as a part of this research protocol and reportedly no acute issues. Recently having some difficulty with sleep. Uses venlafaxine consistently and if he misses a dose he has some mild withdrawal.  He is on memantine to help with headaches and potentially with memory. Background history:  ISADORA scan negative for Parkinson's  multiple migraine medications to include amitriptyline, gabapentin, verapamil, propranolol, Effexor, memantine, Botox, namenda  Formal neuropsychological testing without evidence of dementia or cognitive impairment, 2018, again in 2022 also w/o dementia. MRI brain and cervical spine both within normal limits, recent MRI brain 2022 at [facility] reportedly normal        Review of Systems   HENT:  Negative for hearing loss. Gastrointestinal:  Negative for vomiting. Neurological:  Positive for tremors and headaches. Psychiatric/Behavioral:  Positive for memory loss. The patient is nervous/anxious and has insomnia.     All other systems reviewed and are negative. 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 dislocated jaw . eval'd by ENT .  Dr. Paco Baez and Dentist     Tremor, coarse 2/9/2019    Vertigo      Family History   Problem Relation Age of Onset    Thyroid Disease Mother     Cancer Mother         BREAST    Diabetes Father     Stroke Father         CNS bleed     Thyroid Disease Sister     Lupus Sister     Parkinsonism Maternal Uncle     Dementia Paternal Grandfather     Heart Disease Paternal Uncle     Thyroid Disease Sister     Other Sister         REYNALDS     No Known Problems Son     No Known Problems Son     Anesth Problems Neg Hx      Social History     Socioeconomic History    Marital status:      Spouse name: Not on file    Number of children: Not on file    Years of education: Not on file    Highest education level: Not on file   Occupational History    Not on file   Tobacco Use    Smoking status: Never    Smokeless tobacco: Never   Vaping Use    Vaping Use: Never used   Substance and Sexual Activity    Alcohol use: Yes     Comment: 10-14 drinks per week    Drug use: No    Sexual activity: Not on file   Other Topics Concern    Not on file   Social History Narrative    Not on file     Social Determinants of Health     Financial Resource Strain: Not on file   Food Insecurity: Not on file   Transportation Needs: Not on file   Physical Activity: Not on file   Stress: Not on file   Social Connections: Not on file   Intimate Partner Violence: Not on file   Housing Stability: Not on file     No Known Allergies        Current Outpatient Medications   Medication Sig    venlafaxine-SR (EFFEXOR-XR) 150 mg capsule Take 1 Capsule by mouth daily. memantine ER (NAMENDA XR) 14 mg capsule TAKE 1 CAPSULE DAILY    amLODIPine-benazepril (LOTREL) 5-20 mg per capsule TAKE 1 CAPSULE DAILY (NEEDS APPOINTMENT)    acetaminophen (TYLENOL) 500 mg tablet Take 1,000 mg by mouth every six (6) hours as needed for Pain. chlorphen/pseudoeph/ibuprofen (ADVIL ALLERGY SINUS PO) Take 1 Tablet by mouth as needed. loperamide (IMMODIUM) 2 mg tablet Take 2 mg by mouth four (4) times daily as needed for Diarrhea. bismuth subsalicylate (PEPTO-BISMOL) 262 mg/15 mL suspension Take 30 mL by mouth every six (6) hours as needed for Indigestion. acetaminophen/caffeine (EXCEDRIN TENSION HEADACHE PO) Take 2 Tablets by mouth as needed. onabotulinumtoxinA (Botox) 200 unit injection Inject selected muscles head and neck bilaterally every 12 weeks  Indications: migraine prevention    melatonin tab tablet Take 3 mg by mouth nightly.    magnesium oxide (MAG-OX) 400 mg tablet Take 1 Tab by mouth nightly. fluticasone (FLONASE) 50 mcg/actuation nasal spray 1 [place] by Both Nostrils route two (2) times a day. montelukast (SINGULAIR) 10 mg tablet Take 10 mg by mouth daily. Cetirizine 10 mg cap Take  by mouth. albuterol (PROVENTIL HFA, VENTOLIN HFA) 90 mcg/actuation inhaler Take 2 Puffs by inhalation every four (4) hours as needed. No current facility-administered medications for this visit. Neurologic Exam     Mental Status   WD/WN adult in NAD, normal grooming  VSS  A&O x 3, talkative and pleasant.   Occasional stuttering. PERRL, nonicteric  Face is symmetric, tongue midline  Speech is  clear  No limb ataxia. No abnormal movements seen today  Moving all extemities spontaneously and symmetric  Normal gait         Visit Vitals  /71 (BP 1 Location: Left arm, BP Patient Position: Sitting, BP Cuff Size: Adult)   Pulse 89   Resp (!) 7   Ht 6' (1.829 m)   Wt 246 lb (111.6 kg)   SpO2 98%   BMI 33.36 kg/m²       Assessment and Plan   Diagnoses and all orders for this visit:    1. Intractable chronic migraine without aura and without status migrainosus  -     venlafaxine-SR (EFFEXOR-XR) 150 mg capsule; Take 1 Capsule by mouth daily. -     memantine ER (NAMENDA XR) 14 mg capsule; TAKE 1 CAPSULE DAILY  -     REFERRAL TO PHYSICIAL MEDICINE REHAB    2. History of traumatic brain injury  -     REFERRAL TO PHYSICIAL MEDICINE REHAB    69-year-old gentleman who has a history of recurrent mild traumatic brain injury and subsequent chronic migraine and memory concerns and anxiety who overall is stable. He has done very well with Botox injections I recommend we continue out of medical necessity. He will try to establish with one of our other providers to continue Botox. I am also giving him referral to Dr. Kahlil Nolan at [facility] to follow-up for continued traumatic brain injury management long-term. I will continue his medications we have him on long-term to include venlafaxine and memantine. Continue to be watchful and optimizing his sleep. Hopefully once he returns to playing sports this spring his sleep will improve. He has various stressors at work that also need to be addressed. 30 minutes of time in total was spent reviewing the medical record, face-to-face time, time reviewing paper documentation he brought, and time completing documentation today.         2 [facility], [address]  Diplomate ABPN

## 2023-02-03 ENCOUNTER — OFFICE VISIT (OUTPATIENT)
Dept: ORTHOPEDIC SURGERY | Age: 53
End: 2023-02-03
Payer: COMMERCIAL

## 2023-02-03 VITALS — HEIGHT: 72 IN | BODY MASS INDEX: 33.32 KG/M2 | WEIGHT: 246 LBS

## 2023-02-03 DIAGNOSIS — Z98.890 STATUS POST RIGHT FOOT SURGERY: Primary | ICD-10-CM

## 2023-02-03 DIAGNOSIS — M19.071 ARTHRITIS OF FIRST METATARSOPHALANGEAL (MTP) JOINT OF RIGHT FOOT: ICD-10-CM

## 2023-02-03 NOTE — LETTER
2/10/2023    Patient: Sapna Lora   YOB: 1970   Date of Visit: 2/3/2023     [address], [address] [place] [address]  Via In Basket    Dear Danielle Tinajero MD,      Thank you for referring Mr. Sapna Lora to [facility] for evaluation. My notes for this consultation are attached. If you have questions, please do not hesitate to call me. I look forward to following your patient along with you.       Sincerely,    Dimas Duong MD

## 2023-02-03 NOTE — PROGRESS NOTES
Kendrick Venegas (: 1970) is a 47 y.o. male, patient,here for evaluation of the following   Chief Complaint   Patient presents with    Foot Pain        ASSESSMENT/PLAN:  Below is the assessment and plan developed based on review of pertinent history, physical exam, labs, studies, and medications. 1. Status post right foot surgery  -     XR FOOT RT MIN 3 V; Future  2. Arthritis of first metatarsophalangeal (MTP) joint of right foot  -     XR FOOT RT MIN 3 V; Future    Patient informed of findings on exam and x-rays reviewed, he is doing very well, the first MTP joint is healed status post arthrodesis for hallux rigidus/MTP joint arthritis. He also had correction of hallux valgus position. He can progress to shoe wear once his swelling further decreases. He has minimal symptoms at this time. He will continue therapy program previously recommended. He will return to activities as tolerated. Next time he returns, will get new x-rays of the right foot 3 views weightbearing. Return in about 6 weeks (around 3/17/2023), or if symptoms worsen or fail to improve, for repeat xrays. No Known Allergies    Current Outpatient Medications   Medication Sig    venlafaxine-SR (EFFEXOR-XR) 150 mg capsule Take 1 Capsule by mouth daily. memantine ER (NAMENDA XR) 14 mg capsule TAKE 1 CAPSULE DAILY    amLODIPine-benazepril (LOTREL) 5-20 mg per capsule TAKE 1 CAPSULE DAILY (NEEDS APPOINTMENT)    acetaminophen (TYLENOL) 500 mg tablet Take 1,000 mg by mouth every six (6) hours as needed for Pain. chlorphen/pseudoeph/ibuprofen (ADVIL ALLERGY SINUS PO) Take 1 Tablet by mouth as needed. loperamide (IMMODIUM) 2 mg tablet Take 2 mg by mouth four (4) times daily as needed for Diarrhea. bismuth subsalicylate (PEPTO-BISMOL) 262 mg/15 mL suspension Take 30 mL by mouth every six (6) hours as needed for Indigestion. acetaminophen/caffeine (EXCEDRIN TENSION HEADACHE PO) Take 2 Tablets by mouth as needed. onabotulinumtoxinA (Botox) 200 unit injection Inject selected muscles head and neck bilaterally every 12 weeks  Indications: migraine prevention    melatonin tab tablet Take 3 mg by mouth nightly.    magnesium oxide (MAG-OX) 400 mg tablet Take 1 Tab by mouth nightly. fluticasone (FLONASE) 50 mcg/actuation nasal spray 1 [place] by Both Nostrils route two (2) times a day. montelukast (SINGULAIR) 10 mg tablet Take 10 mg by mouth daily. Cetirizine 10 mg cap Take  by mouth. albuterol (PROVENTIL HFA, VENTOLIN HFA) 90 mcg/actuation inhaler Take 2 Puffs by inhalation every four (4) hours as needed. No current facility-administered medications for this visit. Past Medical History:   Diagnosis Date    Adverse effect of anesthesia     SLOW TO AROUSE     Allergic rhinitis 4/8/2012    Asthma 4/8/2012    Balance disorder     Cancer [address] [address]     skin ca exision from scalp w/ subsequent  local numbness BCC FOREHEAD    GERD (gastroesophageal reflux disease)     h/o gerd    HX OTHER MEDICAL     rt shoulder posterior instability     Hypertension     Intractable chronic migraine without aura 2/9/2019    Dr.Kim Charles    Migraine 4/8/2012    Post concussion syndrome 2/9/2019    Psychiatric disorder     DEPRESSION DUE TO CONCUSSIONS     Raynaud's disease     Right elbow tendinitis     Sciatica     Sinus headache 4/8/2012    Tension headache 4/8/2012    Trauma     nasal fracture, 3 concussions, soft ball eye injury     Trauma 06/2016    concussion . dislocated jaw . eval'd by ENT . Dr. Grecia Gross and Dentist     Kirsten, coarse 2/9/2019    Vertigo        Past Surgical History:   Procedure Laterality Date    HX BACK SURGERY  2020    Cervical C5- C6 DISC surgery     HX CHOLECYSTECTOMY      HX HEENT  march 2015    Septoplasty, sinoplasty, turbinate reduction , Dr. Ankur Rangel      inguinal     HX ORTHOPAEDIC  12/2018    Right Rotator Cuff Repair.  Alondra Keith MD CARTILADGE, BONE SPUR     HX OTHER SURGICAL skin ca excision from face. HX VASECTOMY      HX WISDOM TEETH EXTRACTION      X4 AT 22 YRS OLD        Family History   Problem Relation Age of Onset    Thyroid Disease Mother     Cancer Mother         BREAST    Diabetes Father     Stroke Father         CNS bleed     Thyroid Disease Sister     Lupus Sister     Parkinsonism Maternal Uncle     Dementia Paternal Grandfather     Heart Disease Paternal Uncle     Thyroid Disease Sister     Other Sister         REYNALDS     No Known Problems Son     No Known Problems Son     Anesth Problems Neg Hx        Social History     Socioeconomic History    Marital status:      Spouse name: Not on file    Number of children: Not on file    Years of education: Not on file    Highest education level: Not on file   Occupational History    Not on file   Tobacco Use    Smoking status: Never    Smokeless tobacco: Never   Vaping Use    Vaping Use: Never used   Substance and Sexual Activity    Alcohol use: Yes     Comment: 10-14 drinks per week    Drug use: No    Sexual activity: Not on file   Other Topics Concern    Not on file   Social History Narrative    Not on file     Social Determinants of Health     Financial Resource Strain: Not on file   Food Insecurity: Not on file   Transportation Needs: Not on file   Physical Activity: Not on file   Stress: Not on file   Social Connections: Not on file   Intimate Partner Violence: Not on file   Housing Stability: Not on file           Vitals:  Ht 6' (1.829 m)   Wt 246 lb (111.6 kg)   BMI 33.36 kg/m²    Body mass index is 33.36 kg/m². SUBJECTIVE:  Megan Burr (: 1970)   Patient is back today for reevaluation of right foot status postprocedure more than 2 months ago on 2022, which was first MTP joint arthrodesis and he is doing very well today and he has no complaints.         OBJECTIVE EXAM:     Visit Vitals  Ht 6' (1.829 m)   Wt 246 lb (111.6 kg)   BMI 33.36 kg/m²       Appearance: Alert, well appearing and pleasant patient who is in no distress, oriented to person, place/time, and who follows commands. This patient is accompanied in the       office by his  self. Psychiatric: Affect and mood are appropriate. No dementia noted on examination  Musculoskeletal:  LOCATION: No significant tenderness or swelling hallux foot - right      Integumentary: No rashes, skin patches, wounds, or abrasions to the right or left legs       Warm and normal color. No regions of expressible drainage. Gait: Normal      Tenderness: No tenderness        Motor/Strength/Tone Exam: Normal       Sensory Exam:   Intact Normal Sensation to ankle/foot      Stability Testing: No anterolateral or varus instability of the Ankle or Subtalar Joints               No peroneal tendon instability noted      ROM: Normal ROM noted to ankle/foot      Contractures: No Achilles or Gastrocnemius Contractures      Calf tenderness: Absent for calf or gastrocnemius muscle regions       Soft, supple, non tender, non taut lower extremity compartment  Alignment:      NEUTRAL Hindfoot,         none Metatarsus Adductus Metatarsus   Wounds/Abrasions:    None present  Extremities:   No embolic phenomena to the toes          No significant edema to the foot and or toes. Lower extremities are warm and appear well perfused    DVT: No evidence of DVT seen on examination at this time     No calf swelling, no tenderness to calf muscles  Lymphatic:  No Evidence of Lymphedema  Vascular: Medial Border of Tibia Region: Edema is not present         Pulses: Dorsalis Pedis &  Posterior Tibial Pulses : Palpable yes        Varicosities Lower Limbs :  None  noted  Neuro: Negative bilateral Straight leg raise (seated position)    See Musculoskeletal section for pertinent individual extremity examination    No abnormal hand/wrist, foot/ankle, or facial/neck tremors. Lower Extremity/Ankle/Foot:  Mostly normal gait, normal weightbearing stance.     Right lower extremity/ankle: Full active and passive range of motion intact, nontender, no swelling, ligaments stable. Normal exam.    Right foot: Incisions healing well and is mostly healed there is no signs of infection, he has excellent alignment of the forefoot, nontender, no swelling, ligaments stable. Contralateral lower extremity/ankle /foot exam:  Nontender, no swelling ligaments grossly stable. Normal weightbearing stance. Neurovascular exam grossly intact. Imaging:    XR Results (most recent):  Results from Appointment encounter on 02/03/23    XR FOOT RT MIN 3 V    Narrative  Right foot AP, lateral and oblique nonweightbearing x-rays show first MTP joint arthrodesis is fusing well, there is well aligned first MTP joint with no loosening of implants plate and screws. Satisfactory bone density. An electronic signature was used to authenticate this note.   -- Andres Negro MD

## 2023-02-10 ENCOUNTER — TELEPHONE (OUTPATIENT)
Dept: NEUROLOGY | Age: 53
End: 2023-02-10

## 2023-02-10 NOTE — TELEPHONE ENCOUNTER
Patient is requesting a call back from the nurse to schedule Botox for March. Please call [phone] to set up an appointment.

## 2023-03-14 ENCOUNTER — TELEPHONE (OUTPATIENT)
Dept: NEUROLOGY | Age: 53
End: 2023-03-14

## 2023-03-22 ENCOUNTER — OFFICE VISIT (OUTPATIENT)
Dept: ORTHOPEDIC SURGERY | Age: 53
End: 2023-03-22
Payer: COMMERCIAL

## 2023-03-22 VITALS — HEIGHT: 72 IN | BODY MASS INDEX: 33.32 KG/M2 | WEIGHT: 246 LBS

## 2023-03-22 DIAGNOSIS — M19.071 ARTHRITIS OF FIRST METATARSOPHALANGEAL (MTP) JOINT OF RIGHT FOOT: Primary | ICD-10-CM

## 2023-03-22 DIAGNOSIS — Z98.890 STATUS POST RIGHT FOOT SURGERY: ICD-10-CM

## 2023-03-22 PROCEDURE — 99212 OFFICE O/P EST SF 10 MIN: CPT | Performed by: ORTHOPAEDIC SURGERY

## 2023-03-22 NOTE — PROGRESS NOTES
Pamela Schulte (: 1970) is a 47 y.o. male, patient,here for evaluation of the following   Chief Complaint   Patient presents with    Foot Pain     Right         ASSESSMENT/PLAN:  Below is the assessment and plan developed based on review of pertinent history, physical exam, labs, studies, and medications. 1. Arthritis of first metatarsophalangeal (MTP) joint of right foot  -     XR STANDING FOOT RT MIN 3 V; Future  2. Status post right foot surgery  -     XR STANDING FOOT RT MIN 3 V; Future    Patient is doing very well status post procedure in 2022, he is walking in normal shoes now and he has no pain. He will return to activities as tolerated, he is aware of appropriate shoe wear and insoles. Return if symptoms worsen or fail to improve. No Known Allergies    Current Outpatient Medications   Medication Sig    venlafaxine-SR (EFFEXOR-XR) 150 mg capsule Take 1 Capsule by mouth daily. memantine ER (NAMENDA XR) 14 mg capsule TAKE 1 CAPSULE DAILY    amLODIPine-benazepril (LOTREL) 5-20 mg per capsule TAKE 1 CAPSULE DAILY (NEEDS APPOINTMENT)    acetaminophen (TYLENOL) 500 mg tablet Take 1,000 mg by mouth every six (6) hours as needed for Pain. chlorphen/pseudoeph/ibuprofen (ADVIL ALLERGY SINUS PO) Take 1 Tablet by mouth as needed. loperamide (IMMODIUM) 2 mg tablet Take 2 mg by mouth four (4) times daily as needed for Diarrhea. bismuth subsalicylate (PEPTO-BISMOL) 262 mg/15 mL suspension Take 30 mL by mouth every six (6) hours as needed for Indigestion. acetaminophen/caffeine (EXCEDRIN TENSION HEADACHE PO) Take 2 Tablets by mouth as needed. onabotulinumtoxinA (Botox) 200 unit injection Inject selected muscles head and neck bilaterally every 12 weeks  Indications: migraine prevention    melatonin tab tablet Take 3 mg by mouth nightly.    magnesium oxide (MAG-OX) 400 mg tablet Take 1 Tab by mouth nightly.     fluticasone (FLONASE) 50 mcg/actuation nasal spray 1 Spray by Both Nostrils route two (2) times a day. montelukast (SINGULAIR) 10 mg tablet Take 10 mg by mouth daily. Cetirizine 10 mg cap Take  by mouth. albuterol (PROVENTIL HFA, VENTOLIN HFA) 90 mcg/actuation inhaler Take 2 Puffs by inhalation every four (4) hours as needed. No current facility-administered medications for this visit. 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 dislocated jaw . eval'd by ENT . Dr. Neida Licona and Dentist     Kirsten, coarse 2/9/2019    Vertigo        Past Surgical History:   Procedure Laterality Date    HX BACK SURGERY  2020    Cervical C5- C6 DISC surgery     HX CHOLECYSTECTOMY      HX HEENT  march 2015    Septoplasty, sinoplasty, turbinate reduction , Dr. Donna Clemens      inguinal     HX ORTHOPAEDIC  12/2018    Right Rotator Cuff Repair. Bea Ramirez MD CARTILADGE, BONE SPUR     HX OTHER SURGICAL      skin ca excision from face.      HX VASECTOMY      HX WISDOM TEETH EXTRACTION      X4 AT 22 YRS OLD        Family History   Problem Relation Age of Onset    Thyroid Disease Mother     Cancer Mother         BREAST    Diabetes Father     Stroke Father         CNS bleed     Thyroid Disease Sister     Lupus Sister     Parkinsonism Maternal Uncle     Dementia Paternal Grandfather     Heart Disease Paternal Uncle     Thyroid Disease Sister     Other Sister         BONNY     No Known Problems Son     No Known Problems Son     Anesth Problems Neg Hx        Social History     Socioeconomic History    Marital status:      Spouse name: Not on file    Number of children: Not on file    Years of education: Not on file    Highest education level: Not on file   Occupational History    Not on file   Tobacco Use    Smoking status: Never    Smokeless tobacco: Never   Vaping Use    Vaping Use: Never used   Substance and Sexual Activity    Alcohol use: Yes     Comment: 10-14 drinks per week    Drug use: No    Sexual activity: Not on file   Other Topics Concern    Not on file   Social History Narrative    Not on file     Social Determinants of Health     Financial Resource Strain: Not on file   Food Insecurity: Not on file   Transportation Needs: Not on file   Physical Activity: Not on file   Stress: Not on file   Social Connections: Not on file   Intimate Partner Violence: Not on file   Housing Stability: Not on file           Vitals:  Ht 6' (1.829 m)   Wt 246 lb (111.6 kg)   BMI 33.36 kg/m²    Body mass index is 33.36 kg/m². SUBJECTIVE:  You Kruse (: 1970)   Patient is doing well, he is back for final evaluation of the right foot where he had first MTP joint arthrodesis for hallux rigidus, he had surgery on 2022. He is doing very well he has no complaints. He is wearing regular shoes. OBJECTIVE EXAM:     Visit Vitals  Ht 6' (1.829 m)   Wt 246 lb (111.6 kg)   BMI 33.36 kg/m²       Appearance: Alert, well appearing and pleasant patient who is in no distress, oriented to person, place/time, and who follows commands. This patient is accompanied in the       office by his  self. Psychiatric: Affect and mood are appropriate.  No dementia noted on examination  Musculoskeletal:  LOCATION: No tenderness or swelling foot - right      Integumentary: No rashes, skin patches, wounds, or abrasions to the right or left legs       Warm and normal color. No regions of expressible drainage. Gait: Normal      Tenderness: No tenderness        Motor/Strength/Tone Exam: Normal       Sensory Exam:   Intact Normal Sensation to ankle/foot      Stability Testing: No anterolateral or varus instability of the Ankle or Subtalar Joints               No peroneal tendon instability noted      ROM: Normal ROM noted to ankle/foot      Contractures: No Achilles or Gastrocnemius Contractures      Calf tenderness: Absent for calf or gastrocnemius muscle regions       Soft, supple, non tender, non taut lower extremity compartment  Alignment:      NEUTRAL Hindfoot,         none Metatarsus Adductus Metatarsus   Wounds/Abrasions:    None present  Extremities:   No embolic phenomena to the toes          No significant edema to the foot and or toes. Lower extremities are warm and appear well perfused    DVT: No evidence of DVT seen on examination at this time     No calf swelling, no tenderness to calf muscles  Lymphatic:  No Evidence of Lymphedema  Vascular: Medial Border of Tibia Region: Edema is not present         Pulses: Dorsalis Pedis &  Posterior Tibial Pulses : Palpable yes        Varicosities Lower Limbs :  None  noted  Neuro: Negative bilateral Straight leg raise (seated position)    See Musculoskeletal section for pertinent individual extremity examination    No abnormal hand/wrist, foot/ankle, or facial/neck tremors. Lower Extremity/Ankle/Foot:  Normal gait, Normal weightbearing stance. Right lower extremity/ankle: Skin intact without erythema or wounds, 2+ dorsalis pedis pulse. Toes are warm, well-perfused. Sensation is intact to light touch in all nerve distributions, strength 5/5 in all direction of motion, calf soft nontender, ligament stable, tibia and fibula nontender.     Right foot: Excellent position to the foot when weightbearing seated, there is no tenderness to palpation first MTP joint, incision well-healed, the hallux is stable without deformities. The plates not palpable under the skin. Capillary refill brisk, all toes range of motion intact with the exception of the MTP joint motion which has fusion. Contralateral lower extremity: Skin intact without erythema or wounds. 2+ dorsalis pedis pulse. Toes are warm, and well-perfused. Sensation is intact to light touch in the DP, SP, sural, saphenous, and tibial nerve distributions. 5/5 strength in ankle dorsiflexion, plantarflexion, inversion, and eversion. Ankle range of motion is 10 degrees of dorsiflexion to 40 degrees of plantarflexion. Painless hindfoot and midfoot range of motion. No severe hallux, lesser toe malalignment or deformities, no pain with passive motion of lesser MTP joints, hallux MTP joint, no first TMT instability. Neurovascular exam is grossly intact similar to contralateral.    Imaging:    XR Results (most recent):  Results from Appointment encounter on 03/22/23    XR STANDING FOOT RT MIN 3 V    Narrative  Right foot standing AP, lateral and oblique x-rays show excellent alignment of the hallux status post first MTP joint arthrodesis with retained hardware, implants are intact. Arthrodesis site appears healed, normal bone density. An electronic signature was used to authenticate this note.   -- Savannah Sánchez MD

## 2023-03-22 NOTE — LETTER
3/27/2023    Patient: Ambrocio Ryan   YOB: 1970   Date of Visit: 3/22/2023     [address] [place], [address] [place] [address]  Via In Basket    Dear Waqas Campbell MD,      Thank you for referring Mr. Ambrocio Ryan to [facility] for evaluation. My notes for this consultation are attached. If you have questions, please do not hesitate to call me. I look forward to following your patient along with you.       Sincerely,    Angelica Almodovar MD

## 2023-05-08 ENCOUNTER — TELEPHONE (OUTPATIENT)
Age: 53
End: 2023-05-08

## 2023-05-08 NOTE — TELEPHONE ENCOUNTER
Re: Botox    See Auth termed 05/04/23, Submitted new PA request for  through [facility], [facility] should be [facility]. PA is approved from 04/08/23-05/07/24 auth# [id number]. Created request for cpt 59479 in [employer]. PA is not required but PD is recommended. Created case in [employer]. Request tracking # [id number] Case# [id number]    Sent message to nurse for NP to e-scribe new script to [address] [facility].

## 2023-05-09 ENCOUNTER — TELEPHONE (OUTPATIENT)
Age: 53
End: 2023-05-09

## 2023-05-09 DIAGNOSIS — G43.719 CHRONIC MIGRAINE WITHOUT AURA, INTRACTABLE, WITHOUT STATUS MIGRAINOSUS: Primary | ICD-10-CM

## 2023-05-09 RX ORDER — FLUTICASONE PROPIONATE 50 MCG
SPRAY, SUSPENSION (ML) NASAL
COMMUNITY
Start: 2023-03-27

## 2023-05-09 RX ORDER — MONTELUKAST SODIUM 10 MG/1
TABLET ORAL
COMMUNITY
Start: 2023-02-20

## 2023-05-09 RX ORDER — VENLAFAXINE HYDROCHLORIDE 150 MG/1
CAPSULE, EXTENDED RELEASE ORAL
COMMUNITY
Start: 2023-04-23 | End: 2023-05-12 | Stop reason: SDUPTHER

## 2023-05-09 RX ORDER — AMLODIPINE BESYLATE AND BENAZEPRIL HYDROCHLORIDE 5; 20 MG/1; MG/1
CAPSULE ORAL
COMMUNITY
Start: 2023-04-16

## 2023-05-09 RX ORDER — MEMANTINE HYDROCHLORIDE 14 MG/1
CAPSULE, EXTENDED RELEASE ORAL
COMMUNITY
Start: 2023-03-03 | End: 2023-05-12 | Stop reason: SDUPTHER

## 2023-05-12 ENCOUNTER — OFFICE VISIT (OUTPATIENT)
Age: 53
End: 2023-05-12
Payer: COMMERCIAL

## 2023-05-12 VITALS
HEART RATE: 90 BPM | BODY MASS INDEX: 33.32 KG/M2 | HEIGHT: 72 IN | DIASTOLIC BLOOD PRESSURE: 81 MMHG | OXYGEN SATURATION: 97 % | WEIGHT: 246 LBS | RESPIRATION RATE: 17 BRPM | SYSTOLIC BLOOD PRESSURE: 130 MMHG

## 2023-05-12 DIAGNOSIS — Z87.820 PERSONAL HISTORY OF TRAUMATIC BRAIN INJURY: ICD-10-CM

## 2023-05-12 DIAGNOSIS — G43.719 CHRONIC MIGRAINE WITHOUT AURA, INTRACTABLE, WITHOUT STATUS MIGRAINOSUS: Primary | ICD-10-CM

## 2023-05-12 PROCEDURE — 99214 OFFICE O/P EST MOD 30 MIN: CPT

## 2023-05-12 PROCEDURE — 3075F SYST BP GE 130 - 139MM HG: CPT

## 2023-05-12 PROCEDURE — 3079F DIAST BP 80-89 MM HG: CPT

## 2023-05-12 RX ORDER — VENLAFAXINE HYDROCHLORIDE 150 MG/1
150 CAPSULE, EXTENDED RELEASE ORAL DAILY
Qty: 90 CAPSULE | Refills: 3 | Status: SHIPPED | OUTPATIENT
Start: 2023-05-12

## 2023-05-12 RX ORDER — CETIRIZINE HYDROCHLORIDE 10 MG/1
10 TABLET ORAL DAILY
COMMUNITY

## 2023-05-12 RX ORDER — MEMANTINE HYDROCHLORIDE 14 MG/1
14 CAPSULE, EXTENDED RELEASE ORAL DAILY
Qty: 90 CAPSULE | Refills: 3 | Status: SHIPPED | OUTPATIENT
Start: 2023-05-12

## 2023-05-12 ASSESSMENT — PATIENT HEALTH QUESTIONNAIRE - PHQ9
SUM OF ALL RESPONSES TO PHQ QUESTIONS 1-9: 0
SUM OF ALL RESPONSES TO PHQ QUESTIONS 1-9: 0
SUM OF ALL RESPONSES TO PHQ9 QUESTIONS 1 & 2: 0
SUM OF ALL RESPONSES TO PHQ QUESTIONS 1-9: 0
2. FEELING DOWN, DEPRESSED OR HOPELESS: 0
SUM OF ALL RESPONSES TO PHQ QUESTIONS 1-9: 0
1. LITTLE INTEREST OR PLEASURE IN DOING THINGS: 0

## 2023-05-12 NOTE — PROGRESS NOTES
Chief Complaint   Patient presents with    Follow-up    Migraine      Vitals:    05/12/23 0855   BP: 130/81   Pulse: 90   Resp: 17   SpO2: 97%
stenosis. C3-C4: No herniation or stenosis. Minimal bulging disc. C4-C5: No herniation or stenosis. Minimal bulging disc. C5-C6: Disc degeneration with moderate loss of disc height. Right posterior  lateral disc osteophyte complex with severe narrowing of the right lateral  recess and foramen. Progression since 2018. C6-C7: No herniation or stenosis. C7-T1: No herniation or stenosis. The previously noted T2-3 disc bulge is not completely included on this  examination. Impression  IMPRESSION:  Large right C5-6 disc osteophyte complex with severe right foraminal narrowing,  with progression since 6/7/2018. Assessment and Plan   Ortiz Finley was seen today for follow-up and migraine. Diagnoses and all orders for this visit:    Chronic migraine without aura, intractable, without status migrainosus  -     memantine ER (NAMENDA XR) 14 MG CP24 extended release capsule; Take 1 capsule by mouth daily  -     venlafaxine (EFFEXOR XR) 150 MG extended release capsule; Take 1 capsule by mouth daily    Personal history of traumatic brain injury  -     memantine ER (NAMENDA XR) 14 MG CP24 extended release capsule; Take 1 capsule by mouth daily  -     venlafaxine (EFFEXOR XR) 150 MG extended release capsule; Take 1 capsule by mouth daily      47year old with history of TBI and concussions who sufferers with daily chronic migraine pain. He is on a good regime right now with his medications. Continue this, I will refill everything. He is behind on his Botox, but we are working to get him in. He can feel the effects of not having the Botox, but he is maintain. Exam is good. He sees Sports Medicine Neurology next week who does Botox as well. He knows all his warning signs. He will call for a FU appointment.        I spent 35 minutes of time today reviewing the medical record and notes, imaging, examining the patient, and face-to-face time, patient/family teaching and medication side effects, and time spent

## 2023-05-22 RX ORDER — MAGNESIUM OXIDE 400 MG/1
400 TABLET ORAL
COMMUNITY
Start: 2017-01-17

## 2023-05-22 RX ORDER — ACETAMINOPHEN 500 MG
1000 TABLET ORAL EVERY 6 HOURS PRN
COMMUNITY

## 2023-05-22 RX ORDER — ALBUTEROL SULFATE 90 UG/1
2 AEROSOL, METERED RESPIRATORY (INHALATION) EVERY 4 HOURS PRN
COMMUNITY

## 2023-05-22 RX ORDER — CHOLECALCIFEROL (VITAMIN D3) 125 MCG
3 CAPSULE ORAL NIGHTLY
COMMUNITY

## 2023-05-22 RX ORDER — LOPERAMIDE HYDROCHLORIDE 2 MG/1
2 TABLET ORAL 4 TIMES DAILY PRN
COMMUNITY

## 2024-04-25 ENCOUNTER — TRANSCRIBE ORDERS (OUTPATIENT)
Facility: HOSPITAL | Age: 54
End: 2024-04-25

## 2024-04-25 DIAGNOSIS — M23.203 DEGENERATIVE TEAR OF MEDIAL MENISCUS OF RIGHT KNEE: Primary | ICD-10-CM

## 2024-05-11 ENCOUNTER — HOSPITAL ENCOUNTER (OUTPATIENT)
Facility: HOSPITAL | Age: 54
End: 2024-05-11
Payer: COMMERCIAL

## 2024-05-11 DIAGNOSIS — M23.203 DEGENERATIVE TEAR OF MEDIAL MENISCUS OF RIGHT KNEE: ICD-10-CM

## 2024-05-11 PROCEDURE — 73721 MRI JNT OF LWR EXTRE W/O DYE: CPT

## 2024-05-27 DIAGNOSIS — G43.719 CHRONIC MIGRAINE WITHOUT AURA, INTRACTABLE, WITHOUT STATUS MIGRAINOSUS: ICD-10-CM

## 2024-05-27 DIAGNOSIS — Z87.820 PERSONAL HISTORY OF TRAUMATIC BRAIN INJURY: ICD-10-CM

## 2024-05-28 RX ORDER — MEMANTINE HYDROCHLORIDE 14 MG/1
1 CAPSULE, EXTENDED RELEASE ORAL DAILY
Qty: 90 CAPSULE | Refills: 3 | OUTPATIENT
Start: 2024-05-28

## (undated) DEVICE — FLOSEAL HEMOSTATIC MATRIX, 5ML: Brand: FLOSEAL HEMOSTATIC MATRIX

## (undated) DEVICE — COVER,TABLE,HEAVY DUTY,60"X90",STRL: Brand: MEDLINE

## (undated) DEVICE — BONE WAX WHITE: Brand: BONE WAX WHITE

## (undated) DEVICE — GLOVE ORANGE PI 7 1/2   MSG9075

## (undated) DEVICE — CATH IV AUTOGRD BC GRN 18GA 30 -- INSYTE

## (undated) DEVICE — 4-PORT MANIFOLD: Brand: NEPTUNE 2

## (undated) DEVICE — SPHERE STEALTH 12PK/TY --

## (undated) DEVICE — STERILE POLYISOPRENE POWDER-FREE SURGICAL GLOVES WITH EMOLLIENT COATING: Brand: PROTEXIS

## (undated) DEVICE — DRAPE 9732722 TUBE STERILE O-ARM 20PK: Brand: O-ARM®

## (undated) DEVICE — BIPOLAR IRRIGATOR INTEGRATED TUBING AND BIPOLAR CORD SET, DISPOSABLE

## (undated) DEVICE — SUTURE STRATAFIX SYMMETRIC SZ 1 L18IN ABSRB VLT CT1 L36CM SXPP1A404

## (undated) DEVICE — SOLUTION SURG PREP 26 CC PURPREP

## (undated) DEVICE — TOOL 14BA50 LEGEND 14CM 5MM BA: Brand: MIDAS REX ™

## (undated) DEVICE — SOLUTION IRRIG 3000ML 0.9% SOD CHL USP UROMATIC PLAS CONT

## (undated) DEVICE — TOOL 14MH30 LEGEND 14CM 3MM: Brand: MIDAS REX ™

## (undated) DEVICE — SUTURE ABSRB L30CM 2-0 VLT SPRL PDS + STRATAFIX SXPP1B410

## (undated) DEVICE — STERILE-Z SURGICAL PATIENT COVERS CLEAR POLYETHYLENE STERILE UNIVERSAL FIT 10 PER CASE: Brand: STERILE-Z

## (undated) DEVICE — FLOSEAL HEMOSTATIC MATRIX, 10ML: Brand: FLOSEAL HEMOSTATIC MATRIX

## (undated) DEVICE — DRAPE,LAPAROTOMY,T,PEDI,STERILE: Brand: MEDLINE

## (undated) DEVICE — SOLUTION IV 250ML 0.9% SOD CHL CLR INJ FLX BG CONT PRT CLSR

## (undated) DEVICE — TOTAL TRAY, 16FR 10ML SIL FOLEY, URN: Brand: MEDLINE

## (undated) DEVICE — DRAPE 9733023 BAR STERILE O-ARM 20PK: Brand: O-ARM®

## (undated) DEVICE — SPONGE: SPECIALTY PEANUT XR 100/CS: Brand: MEDICAL ACTION INDUSTRIES

## (undated) DEVICE — PREP KIT PEEL PTCH POVIDONE IOD

## (undated) DEVICE — INFECTION CONTROL KIT SYS

## (undated) DEVICE — SUTURE VCRL 2-0 L27IN ABSRB UD CP-2 L26MM 1/2 CIR REV CUT J869H

## (undated) DEVICE — LAMINECTOMY RICHMOND-LF: Brand: MEDLINE INDUSTRIES, INC.

## (undated) DEVICE — RESERVOIR,SUCTION,100CC,SILICONE: Brand: MEDLINE

## (undated) DEVICE — GLOVE SURG SZ 8 L12IN FNGR THK94MIL STD WHT LTX FREE

## (undated) DEVICE — HANDPIECE SET WITH BONE CLEANING TIP AND SUCTION TUBE: Brand: INTERPULSE

## (undated) DEVICE — SPONGE GZ W4XL4IN COT 12 PLY TYP VII WVN C FLD DSGN

## (undated) DEVICE — DRAPE SURG W41XL74IN CLR FULL SZ C ARM 3 ADH POLY STRP E

## (undated) DEVICE — DRAIN SURG FLAT W7MMXL20CM FULL PERF

## (undated) DEVICE — BIT 6972118 RAIL CUTTER 1.5MM DIAMOND

## (undated) DEVICE — POSITIONER HD REST FOAM CMFRT TCH

## (undated) DEVICE — KIT EVAC 0.13IN RECT TB DIA10FR 400CC PVC 3 SPR Y CONN DRN

## (undated) DEVICE — NDL SPNE QNCKE 18GX3.5IN LF --

## (undated) DEVICE — INSULATED BLADE ELECTRODE: Brand: EDGE

## (undated) DEVICE — LAMINECTOMY-SMH: Brand: MEDLINE INDUSTRIES, INC.

## (undated) DEVICE — COVER,MAYO STAND,STERILE: Brand: MEDLINE

## (undated) DEVICE — Device